# Patient Record
Sex: FEMALE | Race: BLACK OR AFRICAN AMERICAN | Employment: UNEMPLOYED | URBAN - METROPOLITAN AREA
[De-identification: names, ages, dates, MRNs, and addresses within clinical notes are randomized per-mention and may not be internally consistent; named-entity substitution may affect disease eponyms.]

---

## 2017-01-11 ENCOUNTER — HOSPITAL ENCOUNTER (OUTPATIENT)
Dept: PREADMISSION TESTING | Age: 57
Discharge: HOME | End: 2017-01-11
Attending: SURGERY
Payer: COMMERCIAL

## 2017-01-11 VITALS
DIASTOLIC BLOOD PRESSURE: 96 MMHG | BODY MASS INDEX: 28.83 KG/M2 | SYSTOLIC BLOOD PRESSURE: 161 MMHG | WEIGHT: 168.87 LBS | OXYGEN SATURATION: 98 % | HEART RATE: 60 BPM | RESPIRATION RATE: 18 BRPM | HEIGHT: 64 IN | TEMPERATURE: 98.6 F

## 2017-01-11 LAB
ANION GAP BLD CALC-SCNC: 9 MMOL/L (ref 5–15)
APPEARANCE UR: ABNORMAL
ATRIAL RATE: 61 BPM
BACTERIA URNS QL MICRO: ABNORMAL /HPF
BILIRUB UR QL: NEGATIVE
BUN SERPL-MCNC: 12 MG/DL (ref 6–20)
BUN/CREAT SERPL: 14 (ref 12–20)
CALCIUM SERPL-MCNC: 9.3 MG/DL (ref 8.5–10.1)
CALCULATED P AXIS, ECG09: 17 DEGREES
CALCULATED R AXIS, ECG10: -20 DEGREES
CALCULATED T AXIS, ECG11: -3 DEGREES
CHLORIDE SERPL-SCNC: 101 MMOL/L (ref 97–108)
CO2 SERPL-SCNC: 31 MMOL/L (ref 21–32)
COLOR UR: ABNORMAL
CREAT SERPL-MCNC: 0.88 MG/DL (ref 0.55–1.02)
DIAGNOSIS, 93000: NORMAL
EPITH CASTS URNS QL MICRO: ABNORMAL /LPF
ERYTHROCYTE [DISTWIDTH] IN BLOOD BY AUTOMATED COUNT: 13.8 % (ref 11.5–14.5)
GLUCOSE SERPL-MCNC: 100 MG/DL (ref 65–100)
GLUCOSE UR STRIP.AUTO-MCNC: NEGATIVE MG/DL
HCT VFR BLD AUTO: 39.5 % (ref 35–47)
HGB BLD-MCNC: 13.1 G/DL (ref 11.5–16)
HGB UR QL STRIP: ABNORMAL
KETONES UR QL STRIP.AUTO: NEGATIVE MG/DL
LEUKOCYTE ESTERASE UR QL STRIP.AUTO: ABNORMAL
MCH RBC QN AUTO: 29.8 PG (ref 26–34)
MCHC RBC AUTO-ENTMCNC: 33.2 G/DL (ref 30–36.5)
MCV RBC AUTO: 89.8 FL (ref 80–99)
NITRITE UR QL STRIP.AUTO: NEGATIVE
P-R INTERVAL, ECG05: 172 MS
PH UR STRIP: 6.5 [PH] (ref 5–8)
PLATELET # BLD AUTO: 156 K/UL (ref 150–400)
POTASSIUM SERPL-SCNC: 4 MMOL/L (ref 3.5–5.1)
PROT UR STRIP-MCNC: NEGATIVE MG/DL
Q-T INTERVAL, ECG07: 418 MS
QRS DURATION, ECG06: 88 MS
QTC CALCULATION (BEZET), ECG08: 420 MS
RBC # BLD AUTO: 4.4 M/UL (ref 3.8–5.2)
RBC #/AREA URNS HPF: ABNORMAL /HPF (ref 0–5)
SODIUM SERPL-SCNC: 141 MMOL/L (ref 136–145)
SP GR UR REFRACTOMETRY: 1.01 (ref 1–1.03)
UA: UC IF INDICATED,UAUC: ABNORMAL
UROBILINOGEN UR QL STRIP.AUTO: 0.2 EU/DL (ref 0.2–1)
VENTRICULAR RATE, ECG03: 61 BPM
WBC # BLD AUTO: 4.2 K/UL (ref 3.6–11)
WBC URNS QL MICRO: ABNORMAL /HPF (ref 0–4)

## 2017-01-11 PROCEDURE — 80048 BASIC METABOLIC PNL TOTAL CA: CPT | Performed by: SURGERY

## 2017-01-11 PROCEDURE — 87077 CULTURE AEROBIC IDENTIFY: CPT | Performed by: SURGERY

## 2017-01-11 PROCEDURE — 81001 URINALYSIS AUTO W/SCOPE: CPT | Performed by: SURGERY

## 2017-01-11 PROCEDURE — 86900 BLOOD TYPING SEROLOGIC ABO: CPT | Performed by: SURGERY

## 2017-01-11 PROCEDURE — 86920 COMPATIBILITY TEST SPIN: CPT | Performed by: SURGERY

## 2017-01-11 PROCEDURE — 36415 COLL VENOUS BLD VENIPUNCTURE: CPT | Performed by: SURGERY

## 2017-01-11 PROCEDURE — 87186 SC STD MICRODIL/AGAR DIL: CPT | Performed by: SURGERY

## 2017-01-11 PROCEDURE — 87086 URINE CULTURE/COLONY COUNT: CPT | Performed by: SURGERY

## 2017-01-11 PROCEDURE — 85027 COMPLETE CBC AUTOMATED: CPT | Performed by: SURGERY

## 2017-01-11 PROCEDURE — 93005 ELECTROCARDIOGRAM TRACING: CPT

## 2017-01-11 NOTE — PERIOP NOTES
[facility]  Preoperative Instructions        Surgery Date 01/18/2017          Time of Arrival 0545 am Contact # [phone]    1. On the day of your surgery, please report to the Surgical Services Registration Desk and sign in at your designated time. The Surgery Center is located to the right of the Emergency Room. 2. You must have someone with you to drive you home. You should not drive a car for 24 hours following surgery. Please make arrangements for a friend or family member to stay with you for the first 24 hours after your surgery. 3. Do not have anything to eat or drink (including water, gum, mints, coffee, juice) after midnight 01/17/2017. This may not apply to medications prescribed by your physician. Please note special instructions, if applicable. If you are currently taking Plavix, Coumadin, or other blood-thinning agents, contact your surgeon for instructions. 4. We recommend you do not drink any alcoholic beverages for 24 hours before and after your surgery. 5. Have a list of all current medications, including vitamins, herbal supplements and any other over the counter medications. Stop all Aspirin and non-steroidal anti-inflammatory drugs (I.e. Advil, Aleve), as directed by your surgeon's office. Stop all vitamins and herbal supplements seven days prior to your surgery. 6. Wear comfortable clothes. Wear glasses instead of contacts. Do not bring any money or jewelry. Please bring picture ID, insurance card, and any prearranged co-payment or hospital payment. Do not wear make-up, particularly mascara the morning of your surgery. Do not wear nail polish, particularly if you are having foot /hand surgery. Wear your hair loose or down, no ponytails, buns, eduard pins or clips. All body piercings must be removed.   Please shower with antibacterial soap for three consecutive days before and on the morning of surgery, but do not apply any lotions, powders or deodorants after the shower on the day of surgery. Please use a fresh towels after each shower. Please sleep in clean clothes and change bed linens the night before surgery. Please do not shave for 48 hours prior to surgery. Shaving of the face is acceptable. 7. You should understand that if you do not follow these instructions your surgery may be cancelled. If your physical condition changes (I.e. fever, cold or flu) please contact your surgeon as soon as possible. 8. It is important that you be on time. If a situation occurs where you may be late, please call [phone] (OR Holding Area). 9. If you have any questions and or problems, please call [phone] (Pre-admission Testing). 10. Your surgery time may be subject to change. You will receive a phone call the evening prior if your time changes. 11.  If having outpatient surgery, you must have someone to drive you here, stay with you during the duration of your stay, and to drive you home at time of discharge. Special Instructions:follow bowel prep per surgeon    MEDICATIONS TO TAKE THE MORNING OF SURGERY WITH A SIP OF WATER:xanax if needed, zyrtec if needed, metoprolol      I understand a pre-operative phone call will be made to verify my surgery time. In the event that I am not available, I give permission for a message to be left on my answering service and/or with another person?   yes         ___________________      __________   _________    (Signature of Patient)             (Witness)                (Date and Time)

## 2017-01-12 ENCOUNTER — TELEPHONE (OUTPATIENT)
Dept: SURGERY | Age: 57
End: 2017-01-12

## 2017-01-12 NOTE — TELEPHONE ENCOUNTER
Courtney called from Pre-op. EKG abnormal. Patient needs cardiac clearance. Spoke to [place] made for 1/13. Mrs. Bernarda Gooden has Nithin Flores now. She does not have cards yet. But ID she does not know if she needs referral. But she is going to check into it. For now will keep surgery on for 1/18.

## 2017-01-13 LAB
BACTERIA SPEC CULT: NORMAL
CC UR VC: NORMAL
SERVICE CMNT-IMP: NORMAL

## 2017-01-13 RX ORDER — SODIUM CHLORIDE, SODIUM LACTATE, POTASSIUM CHLORIDE, CALCIUM CHLORIDE 600; 310; 30; 20 MG/100ML; MG/100ML; MG/100ML; MG/100ML
25 INJECTION, SOLUTION INTRAVENOUS CONTINUOUS
Status: CANCELLED | OUTPATIENT
Start: 2017-01-18

## 2017-01-13 NOTE — PERIOP NOTES
Called and spoke to Sue Gonzales in Dr Roy's office about her abnormal urine culture. She will have Dr Edwina Black evaluate for treatment. Pt surgery was changed to 02/01/2017.

## 2017-01-16 ENCOUNTER — OFFICE VISIT (OUTPATIENT)
Dept: INTERNAL MEDICINE CLINIC | Facility: CLINIC | Age: 57
End: 2017-01-16

## 2017-01-16 VITALS
OXYGEN SATURATION: 96 % | DIASTOLIC BLOOD PRESSURE: 101 MMHG | HEIGHT: 64 IN | WEIGHT: 167.4 LBS | TEMPERATURE: 96.8 F | BODY MASS INDEX: 28.58 KG/M2 | RESPIRATION RATE: 18 BRPM | HEART RATE: 62 BPM | SYSTOLIC BLOOD PRESSURE: 150 MMHG

## 2017-01-16 DIAGNOSIS — K57.31 DIVERTICULOSIS OF LARGE INTESTINE WITH HEMORRHAGE: Primary | Chronic | ICD-10-CM

## 2017-01-16 DIAGNOSIS — F41.9 ANXIETY DISORDER, UNSPECIFIED TYPE: Chronic | ICD-10-CM

## 2017-01-16 DIAGNOSIS — I10 ESSENTIAL HYPERTENSION WITH GOAL BLOOD PRESSURE LESS THAN 140/90: Chronic | ICD-10-CM

## 2017-01-16 DIAGNOSIS — N30.01 ACUTE CYSTITIS WITH HEMATURIA: ICD-10-CM

## 2017-01-16 RX ORDER — ALPRAZOLAM 0.5 MG/1
TABLET ORAL
Qty: 60 TAB | Refills: 1 | Status: CANCELLED | OUTPATIENT
Start: 2017-01-16

## 2017-01-16 RX ORDER — LISINOPRIL AND HYDROCHLOROTHIAZIDE 20; 25 MG/1; MG/1
TABLET ORAL
Qty: 90 TAB | Refills: 1 | Status: SHIPPED | OUTPATIENT
Start: 2017-01-16 | End: 2017-07-12 | Stop reason: SDUPTHER

## 2017-01-16 RX ORDER — CIPROFLOXACIN 500 MG/1
500 TABLET ORAL 2 TIMES DAILY
Qty: 6 TAB | Refills: 0 | Status: SHIPPED | OUTPATIENT
Start: 2017-01-16 | End: 2017-02-05

## 2017-01-16 RX ORDER — POTASSIUM CHLORIDE 750 MG/1
TABLET, EXTENDED RELEASE ORAL
Qty: 90 TAB | Refills: 1 | Status: SHIPPED | OUTPATIENT
Start: 2017-01-16 | End: 2017-07-12 | Stop reason: SDUPTHER

## 2017-01-16 RX ORDER — METOPROLOL SUCCINATE 50 MG/1
TABLET, EXTENDED RELEASE ORAL
Qty: 90 TAB | Refills: 1 | Status: SHIPPED | OUTPATIENT
Start: 2017-01-16 | End: 2017-07-12 | Stop reason: SDUPTHER

## 2017-01-16 NOTE — PROGRESS NOTES
HISTORY OF PRESENT ILLNESS  Pavithra Adams is a 60 y.o. female. New patient. Wants to come here because it is close to her home and we have more same-day availability than her last practice. HPI   1) Hx severe diverticulitis with 2 hospitalizations - first one in 2014 for 14 days. Most recently for 6 days in Sept 2016. Has surgery scheduled, but has to be first cleared by Cardiology. Patient doesn't remember the name of her cardiologist, but she needs a referral and she has an appointment tomorrow morning at 9 AM.     2) HTN - Uncontrolled today because pt has been off of her medication for over 1 week. She plans to go to the pharmacy immediately after our visit today for refills. 3) Anxiety - controlled. Taking xanax ~1x/week. Not due for refill. 4) Had pre-op labs done per Surgeon and was found to have UTI. Reviewed culture results with patient. Pt has not yet been given an antibiotic or informed of these lab results by her surgeon. She is asymptomatic. Review of Systems   Constitutional: Negative for fever. Respiratory: Negative for shortness of breath. Cardiovascular: Negative for chest pain and palpitations. Gastrointestinal: Negative for abdominal pain and blood in stool. Genitourinary: Negative for dysuria, frequency and urgency. Physical Exam   Constitutional: She is oriented to person, place, and time. She appears well-developed and well-nourished. No distress. HENT:   Head: Normocephalic and atraumatic. Neck: Neck supple. No JVD present. Cardiovascular: Normal rate, regular rhythm and normal heart sounds. Pulmonary/Chest: Effort normal and breath sounds normal. No respiratory distress. Musculoskeletal: She exhibits no edema. Neurological: She is alert and oriented to person, place, and time. Skin: Skin is warm and dry. Psychiatric: She has a normal mood and affect.  Her behavior is normal. Judgment and thought content normal.   Nursing note and vitals reviewed. ASSESSMENT and PLAN    ICD-10-CM ICD-9-CM    1. Diverticulosis of large intestine with hemorrhage K57.31 562.12 REFERRAL TO GENERAL SURGERY  Await message from pt re: name of cardiologist.    2. Essential hypertension with goal blood pressure less than 140/90 I10 401.9 lisinopril-hydroCHLOROthiazide (PRINZIDE, ZESTORETIC) 20-25 mg per tablet      metoprolol succinate (TOPROL-XL) 50 mg XL tablet      potassium chloride (KLOR-CON M10) 10 mEq tablet   3. Anxiety disorder, unspecified type F41.9 300.00 Controlled with rare use of Xanax. Refill not given today because pt is not currently in need of refill.     4. Acute cystitis with hematuria N30.01 595.0 ciprofloxacin HCl (CIPRO) 500 mg tablet

## 2017-01-16 NOTE — PROGRESS NOTES
Room 7    Chief Complaint   Patient presents with    New Patient     To establish care. Patient has pending surgery of sigmoid colectomy     1. Have you been to the ER, urgent care clinic since your last visit? Hospitalized since your last visit? Yes Reason for visit: To [facility] for abdominal pain on 9/13/2016. Dx. Diverticulosis    2. Have you seen or consulted any other health care providers outside of the [address] [place] [address] since your last visit? Include any pap smears or colon screening. No     No Health maintenance due    Reviewed advance directives/living will. Patient does not have one in place. Information attached to patient's AVS    B/P elevated.  Patient states she did not take medications today

## 2017-01-16 NOTE — MR AVS SNAPSHOT
Visit Information Date & Time Provider Department Dept. Phone Encounter #  
 1/16/2017  2:00 PM Maribel Guzmán, [address] [place] [address] Internal Medicine [phone] [id number] Follow-up Instructions Return for Physical when convenient. Your Appointments 1/17/2017  9:45 AM  
New Patient with Sameera Hong MD  
[facility] Associate [address] [facility] Appt Note: clearance for lap sig moidcolectomy on 1/18/17; must be seen asap [address] [address]  
[phone]  
  
   
 [address] Upcoming Health Maintenance Date Due  
 BREAST CANCER SCRN MAMMOGRAM 12/3/2017 DTaP/Tdap/Td series (2 - Td) 6/1/2025 COLONOSCOPY 11/9/2026 Allergies as of 1/16/2017  Review Complete On: 1/16/2017 By: Maribel Guzmán PA-C Severity Noted Reaction Type Reactions Cephalexin  01/16/2017    Hives Current Immunizations  Reviewed on 4/26/2015 Name Date Influenza Vaccine 9/1/2016 Influenza Vaccine (Quad) 10/8/2015 11:59 AM  
 Influenza Vaccine Intradermal PF 2/20/2014  9:13 AM  
 Tdap 6/1/2015 Not reviewed this visit You Were Diagnosed With   
  
 Codes Comments Acute cystitis with hematuria    -  Primary ICD-10-CM: N30.01 
ICD-9-CM: 595.0 Anxiety disorder, unspecified type     ICD-10-CM: F41.9 ICD-9-CM: 300.00 Essential hypertension with goal blood pressure less than 140/90     ICD-10-CM: I10 
ICD-9-CM: 401.9 Diverticulosis of large intestine with hemorrhage     ICD-10-CM: K57.31 
ICD-9-CM: 562.12 Vitals BP Pulse Temp Resp Height(growth percentile) Weight(growth percentile) (!) 150/101 (BP 1 Location: Left arm, BP Patient Position: Sitting) 62 96.8 °F (36 °C) (Oral) 18 5' 4\" (1.626 m) 167 lb 6.4 oz (75.9 kg) LMP SpO2 BMI OB Status Smoking Status 02/12/2005 96% 28.73 kg/m2 Hysterectomy Never Smoker Vitals History BMI and BSA Data Body Mass Index Body Surface Area 28.73 kg/m 2 1.85 m 2 Preferred Pharmacy Pharmacy Name Phone [address] [phone] Your Updated Medication List  
  
   
This list is accurate as of: 1/16/17  2:57 PM.  Always use your most recent med list.  
  
  
  
  
 ALPRAZolam 0.5 mg tablet Commonly known as:  XANAX  
TAKE ONE-HALF TO ONE TABLET BY MOUTH EVERY 8 HOURS AS NEEDED FOR ANXIETY, AGGRESSION, AND SLEEP **SHOULD LAST AT LEAST 30 DAYS**  
  
 ciprofloxacin HCl 500 mg tablet Commonly known as:  CIPRO Take 1 Tab by mouth two (2) times a day. lisinopril-hydroCHLOROthiazide 20-25 mg per tablet Commonly known as:  PRINZIDE, ZESTORETIC  
TAKE ONE TABLET BY MOUTH EVERY DAY  
  
 metoprolol succinate 50 mg XL tablet Commonly known as:  TOPROL-XL  
TAKE ONE TABLET BY MOUTH EVERY DAY  
  
 naproxen 500 mg tablet Commonly known as:  NAPROSYN Take 1 Tab by mouth every twelve (12) hours as needed for Pain.  
  
 potassium chloride 10 mEq tablet Commonly known as:  KLOR-CON M10  
TAKE ONE TABLET BY MOUTH EVERY DAY ZyrTEC 10 mg tablet Generic drug:  cetirizine Take 10 mg by mouth daily as needed. Prescriptions Sent to Pharmacy Refills  
 lisinopril-hydroCHLOROthiazide (PRINZIDE, ZESTORETIC) 20-25 mg per tablet 1 Sig: TAKE ONE TABLET BY MOUTH EVERY DAY Class: Normal  
 Pharmacy: [address] Ph #: [phone]  
 metoprolol succinate (TOPROL-XL) 50 mg XL tablet 1 Sig: TAKE ONE TABLET BY MOUTH EVERY DAY Class: Normal  
 Pharmacy: [address] Ph #: [phone]  
 potassium chloride (KLOR-CON M10) 10 mEq tablet 1 Sig: TAKE ONE TABLET BY MOUTH EVERY DAY Class: Normal  
 Pharmacy: [address] Ph #: [phone] ciprofloxacin HCl (CIPRO) 500 mg tablet 0 Sig: Take 1 Tab by mouth two (2) times a day. Class: Normal  
 Pharmacy: Audrey Giron [address] , [address] [facility] #: [phone] Route: Oral  
  
We Performed the Following REFERRAL TO GENERAL SURGERY [REF27 Custom] Follow-up Instructions Return for Physical when convenient. Referral Information Referral ID Referred By Referred To  
  
 [id number] Yudi Loaiza MD   
   [address] [place] [address] [address] [place] [address] Phone: [phone] Fax: [phone] Visits Status Start Date End Date 1 New Request 1/16/17 1/16/18 If your referral has a status of pending review or denied, additional information will be sent to support the outcome of this decision. Patient Instructions Ernestine Nunez 1721 What is a living will? A living will is a legal form you use to write down the kind of care you want at the end of your life. It is used by the health professionals who will treat you if you aren't able to decide for yourself. If you put your wishes in writing, your loved ones and others will know what kind of care you want. They won't need to guess. This can ease your mind and be helpful to others. A living will is not the same as an estate or property will. An estate will explains what you want to happen with your money and property after you die. Is a living will a legal document? A living will is a legal document. Each state has its own laws about living ludwig. If you move to another state, make sure that your living will is legal in the state where you now live. Or you might use a universal form that has been approved by many states. This kind of form can sometimes be completed and stored online. Your electronic copy will then be available wherever you have a connection to the Internet.  In most cases, doctors will respect your wishes even if you have a form from a different state. · You don't need an  to complete a living will. But legal advice can be helpful if your state's laws are unclear, your health history is complicated, or your family can't agree on what should be in your living will. · You can change your living will at any time. Some people find that their wishes about end-of-life care change as their health changes. · In addition to making a living will, think about completing a medical power of  form. This form lets you name the person you want to make end-of-life treatment decisions for you (your \"health care agent\") if you're not able to. Many hospitals and nursing homes will give you the forms you need to complete a living will and a medical power of . · Your living will is used only if you can't make or communicate decisions for yourself anymore. If you become able to make decisions again, you can accept or refuse any treatment, no matter what you wrote in your living will. · Your state may offer an online registry. This is a place where you can store your living will online so the doctors and nurses who need to treat you can find it right away. What should you think about when creating a living will? Talk about your end-of-life wishes with your family members and your doctor. Let them know what you want. That way the people making decisions for you won't be surprised by your choices. Think about these questions as you make your living will: · Do you know enough about life support methods that might be used? If not, talk to your doctor so you know what might be done if you can't breathe on your own, your heart stops, or you're unable to swallow. · What things would you still want to be able to do after you receive life-support methods? Would you want to be able to walk? To speak? To eat on your own? To live without the help of machines? · If you have a choice, where do you want to be cared for? In your home? At a hospital or nursing home? · Do you want certain Mandaen practices performed if you become very ill? · If you have a choice at the end of your life, where would you prefer to die? At home? In a hospital or nursing home? Somewhere else? · Would you prefer to be buried or cremated? · Do you want your organs to be donated after you die? What should you do with your living will? · Make sure that your family members and your health care agent have copies of your living will. · Give your doctor a copy of your living will to keep in your medical record. If you have more than one doctor, make sure that each one has a copy. · You may want to put a copy of your living will where it can be easily found. Where can you learn more? Go to http://jean carlos-rufus.info/. Enter V022 in the search box to learn more about \"Learning About Living Melida Endo. \" Current as of: February 24, 2016 Content Version: 11.1 © 8249-1906 [employer]. Care instructions adapted under license by [employer] (which disclaims liability or warranty for this information). If you have questions about a medical condition or this instruction, always ask your healthcare professional. [address] any warranty or liability for your use of this information. Introducing [facility]! [employer] introduces Uzabase patient portal. Now you can access parts of your medical record, email your doctor's office, and request medication refills online. 1. In your internet browser, go to [email or web address]. [email or web address]'s Premium 2. Click on the First Time User? Click Here link in the Sign In box. You will see the New Member Sign Up page. 3. Enter your Uzabase Access Code exactly as it appears below. You will not need to use this code after youve completed the sign-up process.  If you do not sign up before the expiration date, you must request a new code. · [email or web address] Access Code: [email or web address] Expires: 2/14/2017  5:01 PM 
 
4. Enter the last four digits of your Social Security Number (xxxx) and Date of Birth (mm/dd/yyyy) as indicated and click Submit. You will be taken to the next sign-up page. 5. Create a Semant.io ID. This will be your Semant.io login ID and cannot be changed, so think of one that is secure and easy to remember. 6. Create a Semant.io password. You can change your password at any time. 7. Enter your Password Reset Question and Answer. This can be used at a later time if you forget your password. 8. Enter your e-mail address. You will receive e-mail notification when new information is available in [address] 9. Click Sign Up. You can now view and download portions of your medical record. 10. Click the Download Summary menu link to download a portable copy of your medical information. If you have questions, please visit the Frequently Asked Questions section of the [email or web address] website. Remember, Semant.io is NOT to be used for urgent needs. For medical emergencies, dial 911. Now available from your iPhone and Android! Please provide this summary of care documentation to your next provider. Your primary care clinician is listed as Veronique Andrade. If you have any questions after today's visit, please call [phone].

## 2017-01-16 NOTE — PATIENT INSTRUCTIONS
Learning About Living Dmitriy  What is a living will? A living will is a legal form you use to write down the kind of care you want at the end of your life. It is used by the health professionals who will treat you if you aren't able to decide for yourself. If you put your wishes in writing, your loved ones and others will know what kind of care you want. They won't need to guess. This can ease your mind and be helpful to others. A living will is not the same as an estate or property will. An estate will explains what you want to happen with your money and property after you die. Is a living will a legal document? A living will is a legal document. Each state has its own laws about living ludwig. If you move to another state, make sure that your living will is legal in the state where you now live. Or you might use a universal form that has been approved by many states. This kind of form can sometimes be completed and stored online. Your electronic copy will then be available wherever you have a connection to the Internet. In most cases, doctors will respect your wishes even if you have a form from a different state. · You don't need an  to complete a living will. But legal advice can be helpful if your state's laws are unclear, your health history is complicated, or your family can't agree on what should be in your living will. · You can change your living will at any time. Some people find that their wishes about end-of-life care change as their health changes. · In addition to making a living will, think about completing a medical power of  form. This form lets you name the person you want to make end-of-life treatment decisions for you (your \"health care agent\") if you're not able to. Many hospitals and nursing homes will give you the forms you need to complete a living will and a medical power of .   · Your living will is used only if you can't make or communicate decisions for yourself anymore. If you become able to make decisions again, you can accept or refuse any treatment, no matter what you wrote in your living will. · Your state may offer an online registry. This is a place where you can store your living will online so the doctors and nurses who need to treat you can find it right away. What should you think about when creating a living will? Talk about your end-of-life wishes with your family members and your doctor. Let them know what you want. That way the people making decisions for you won't be surprised by your choices. Think about these questions as you make your living will:  · Do you know enough about life support methods that might be used? If not, talk to your doctor so you know what might be done if you can't breathe on your own, your heart stops, or you're unable to swallow. · What things would you still want to be able to do after you receive life-support methods? Would you want to be able to walk? To speak? To eat on your own? To live without the help of machines? · If you have a choice, where do you want to be cared for? In your home? At a hospital or nursing home? · Do you want certain Sabianism practices performed if you become very ill? · If you have a choice at the end of your life, where would you prefer to die? At home? In a hospital or nursing home? Somewhere else? · Would you prefer to be buried or cremated? · Do you want your organs to be donated after you die? What should you do with your living will? · Make sure that your family members and your health care agent have copies of your living will. · Give your doctor a copy of your living will to keep in your medical record. If you have more than one doctor, make sure that each one has a copy. · You may want to put a copy of your living will where it can be easily found. Where can you learn more? Go to http://jean carlos-rufus.info/.   Enter W925 in the search box to learn more about \"Learning About Living Perroy. \"  Current as of: February 24, 2016  Content Version: 11.1  © 5890-0395 [employer]. Care instructions adapted under license by [employer] (which disclaims liability or warranty for this information). If you have questions about a medical condition or this instruction, always ask your healthcare professional. [address] any warranty or liability for your use of this information.

## 2017-01-17 ENCOUNTER — OFFICE VISIT (OUTPATIENT)
Dept: CARDIOLOGY CLINIC | Age: 57
End: 2017-01-17

## 2017-01-17 VITALS
OXYGEN SATURATION: 99 % | SYSTOLIC BLOOD PRESSURE: 130 MMHG | HEIGHT: 64 IN | RESPIRATION RATE: 16 BRPM | HEART RATE: 78 BPM | BODY MASS INDEX: 28.34 KG/M2 | DIASTOLIC BLOOD PRESSURE: 80 MMHG | WEIGHT: 166 LBS

## 2017-01-17 DIAGNOSIS — I10 HYPERTENSION, GOAL BELOW 140/90: Chronic | ICD-10-CM

## 2017-01-17 DIAGNOSIS — R94.31 ABNORMAL EKG: ICD-10-CM

## 2017-01-17 DIAGNOSIS — R94.31 PRE-OPERATIVE CARDIOVASCULAR EXAM, NEW EKG ABNORMALITIES C/W ISCHEMIA: Primary | ICD-10-CM

## 2017-01-17 DIAGNOSIS — Z01.810 PRE-OPERATIVE CARDIOVASCULAR EXAM, NEW EKG ABNORMALITIES C/W ISCHEMIA: Primary | ICD-10-CM

## 2017-01-17 NOTE — PERIOP NOTES
Called Dr Roy's office and spoke to Lisa Duong to ask about abnormal urine culture. She informed me that Dr Collette Heimlich has the results to review on his desk.

## 2017-01-17 NOTE — TELEPHONE ENCOUNTER
Left message for Mrs. Chase regarding surgery for 2/1. Need to speak to her regarding re-type and cross.

## 2017-01-17 NOTE — PROGRESS NOTES
Chief Complaint   Patient presents with    New Patient     cardiac clearance-pt denies any cardiac symptoms

## 2017-01-17 NOTE — MR AVS SNAPSHOT
Visit Information Date & Time Provider Department Dept. Phone Encounter #  
 1/17/2017  9:45 AM Black York, [address] [place] [address] Cardiology Associate Declan [phone] [id number] Follow-up Instructions Routing History Follow-up and Disposition History Upcoming Health Maintenance Date Due  
 BREAST CANCER SCRN MAMMOGRAM 12/3/2017 DTaP/Tdap/Td series (2 - Td) 6/1/2025 COLONOSCOPY 11/9/2026 Allergies as of 1/17/2017  Review Complete On: 1/17/2017 By: Black York MD  
  
 Severity Noted Reaction Type Reactions Cephalexin  01/16/2017    Hives Current Immunizations  Reviewed on 4/26/2015 Name Date Influenza Vaccine 9/1/2016 Influenza Vaccine (Quad) 10/8/2015 11:59 AM  
 Influenza Vaccine Intradermal PF 2/20/2014  9:13 AM  
 Tdap 6/1/2015 Not reviewed this visit You Were Diagnosed With   
  
 Codes Comments Pre-operative cardiovascular exam, new EKG abnormalities c/w ischemia    -  Primary ICD-10-CM: Z01.810, R94.31 
ICD-9-CM: V72.81, 794.31 Hypertension, goal below 140/90     ICD-10-CM: I10 
ICD-9-CM: 401.9 Abnormal EKG     ICD-10-CM: R94.31 
ICD-9-CM: 794.31 Vitals BP Pulse Resp Height(growth percentile) Weight(growth percentile) LMP  
 130/80 (BP 1 Location: Right arm, BP Patient Position: Sitting) 78 16 5' 4\" (1.626 m) 166 lb (75.3 kg) 02/12/2005 SpO2 BMI OB Status Smoking Status 99% 28.49 kg/m2 Hysterectomy Never Smoker Vitals History BMI and BSA Data Body Mass Index Body Surface Area  
 28.49 kg/m 2 1.84 m 2 Preferred Pharmacy Pharmacy Name Phone [facility] [address] , [address] [facility] [phone] Your Updated Medication List  
  
   
This list is accurate as of: 1/17/17 10:48 AM.  Always use your most recent med list.  
  
  
  
  
 ALPRAZolam 0.5 mg tablet Commonly known as:  [place] Curio TAKE ONE-HALF TO ONE TABLET BY MOUTH EVERY 8 HOURS AS NEEDED FOR ANXIETY, AGGRESSION, AND SLEEP **SHOULD LAST AT LEAST 30 DAYS**  
  
 ciprofloxacin HCl 500 mg tablet Commonly known as:  CIPRO Take 1 Tab by mouth two (2) times a day. lisinopril-hydroCHLOROthiazide 20-25 mg per tablet Commonly known as:  PRINZIDE, ZESTORETIC  
TAKE ONE TABLET BY MOUTH EVERY DAY  
  
 metoprolol succinate 50 mg XL tablet Commonly known as:  TOPROL-XL  
TAKE ONE TABLET BY MOUTH EVERY DAY  
  
 potassium chloride 10 mEq tablet Commonly known as:  KLOR-CON M10  
TAKE ONE TABLET BY MOUTH EVERY DAY ZyrTEC 10 mg tablet Generic drug:  cetirizine Take 10 mg by mouth daily as needed. We Performed the Following AMB POC EKG ROUTINE W/ 12 LEADS, INTER & REP [12204 CPT(R)] To-Do List   
 01/17/2017 ECHO:  2D ECHO COMPLETE ADULT (TTE) W OR WO CONTR Introducing [facility]! Will Dickinson introduces [employer] patient portal. Now you can access parts of your medical record, email your doctor's office, and request medication refills online. 1. In your internet browser, go to [email or web address] 2. Click on the First Time User? Click Here link in the Sign In box. You will see the New Member Sign Up page. 3. Enter your [employer] Access Code exactly as it appears below. You will not need to use this code after youve completed the sign-up process. If you do not sign up before the expiration date, you must request a new code. · [employer] Access Code: [email or web address] Expires: 2/14/2017  5:01 PM 
 
4. Enter the last four digits of your Social Security Number (xxxx) and Date of Birth (mm/dd/yyyy) as indicated and click Submit. You will be taken to the next sign-up page. 5. Create a [employer] ID. This will be your [employer] login ID and cannot be changed, so think of one that is secure and easy to remember. 6. Create a [employer] password. You can change your password at any time. 7. Enter your Password Reset Question and Answer. This can be used at a later time if you forget your password. 8. Enter your e-mail address. You will receive e-mail notification when new information is available in [address] 9. Click Sign Up. You can now view and download portions of your medical record. 10. Click the Download Summary menu link to download a portable copy of your medical information. If you have questions, please visit the Frequently Asked Questions section of the [employer] website. Remember, [employer] is NOT to be used for urgent needs. For medical emergencies, dial 911. Now available from your iPhone and Android! Please provide this summary of care documentation to your next provider. Your primary care clinician is listed as Shira Mcneal. If you have any questions after today's visit, please call [phone].

## 2017-01-17 NOTE — PROGRESS NOTES
[address] [place] [address] [address] [facility]  [phone]     Subjective:      Yayo Bullock is a 60 y.o. female is here for NP eval for septal MI seen on pre-op EKG. No cardiac Hx. The patient denies chest pain/ shortness of breath, orthopnea, PND, LE edema, palpitations, syncope, or presyncope.        Patient Active Problem List    Diagnosis Date Noted    Diverticulitis of intestine with abscess 09/13/2016    Hypovitaminosis D 04/27/2015    Microscopic hematuria 04/27/2015    Sigmoid diverticulitis 02/14/2014    Diverticulosis of large intestine with hemorrhage 02/14/2014     Class: Chronic    Anxiety disorder 02/14/2014     Class: Chronic    Dehydration 02/14/2014    Hyperlipidemia with target LDL less than 100 05/13/2013     Class: Chronic    Hypertension, goal below 140/90 10/16/2012     Class: Chronic      Chandni Taylor PA-C  Past Medical History   Diagnosis Date    Diverticulosis of large intestine with hemorrhage 02/14/2014     distal sigmoid colon, mid-sigmoid colon, descending colon - colonoscopy 11/9/16    Hyperlipemia       5/2016; not on medication    Hypertension     Hypovitaminosis D 04/27/2015     level normal 5/2016    Microscopic hematuria 4/27/2015    Psychiatric disorder      anxiety      Past Surgical History   Procedure Laterality Date    Hx hysterectomy  3/17/2005     TVH    Hx gyn       ectopic pregnancy    Hx colonoscopy  07/23/2015     Allergies   Allergen Reactions    Cephalexin Hives      Family History   Problem Relation Age of Onset    Hypertension Mother     Hypertension Father     Hypertension Sister     Hypertension Brother     Hypertension Sister     Hypertension Sister     Hypertension Sister       Social History     Social History    Marital status:      Spouse name: N/A    Number of children: N/A    Years of education: N/A     Occupational History    caregiver      Social History Main Topics    Smoking status: Never Smoker    Smokeless tobacco: Never Used    Alcohol use 1.2 oz/week     2 Glasses of wine per week      Comment: occassionally:     Drug use: Yes     Special: Marijuana      Comment: last used in December 2016    Sexual activity: Yes     Partners: Male     Birth control/ protection: Surgical     Other Topics Concern    Not on file     Social History Narrative      Current Outpatient Prescriptions   Medication Sig    lisinopril-hydroCHLOROthiazide (PRINZIDE, ZESTORETIC) 20-25 mg per tablet TAKE ONE TABLET BY MOUTH EVERY DAY    metoprolol succinate (TOPROL-XL) 50 mg XL tablet TAKE ONE TABLET BY MOUTH EVERY DAY    potassium chloride (KLOR-CON M10) 10 mEq tablet TAKE ONE TABLET BY MOUTH EVERY DAY    ciprofloxacin HCl (CIPRO) 500 mg tablet Take 1 Tab by mouth two (2) times a day.  ALPRAZolam (XANAX) 0.5 mg tablet TAKE ONE-HALF TO ONE TABLET BY MOUTH EVERY 8 HOURS AS NEEDED FOR ANXIETY, AGGRESSION, AND SLEEP **SHOULD LAST AT LEAST 30 DAYS**    cetirizine (ZYRTEC) 10 mg tablet Take 10 mg by mouth daily as needed. No current facility-administered medications for this visit. Review of Symptoms:  11 systems reviewed, negative other than as stated in the HPI    Physical ExamPhysical Exam:    Vitals:    01/17/17 0949 01/17/17 0955   BP: 124/78 130/80   Pulse:  78   Resp:  16   SpO2:  99%   Weight: 166 lb (75.3 kg)    Height: 5' 4\" (1.626 m)      Body mass index is 28.49 kg/(m^2). General PE   Gen:  NAD  Mental Status - Alert. General Appearance - Not in acute distress. Chest and Lung Exam   Inspection: Accessory muscles - No use of accessory muscles in breathing. Auscultation:   Breath sounds: - Normal.   Cardiovascular   Inspection: Jugular vein - Bilateral - Inspection Normal.   Palpation/Percussion:   Apical Impulse: - Normal.   Auscultation: Rhythm - Regular. Heart Sounds - S1 WNL and S2 WNL. No S3 or S4. Murmurs & Other Heart Sounds: Auscultation of the heart reveals - No Murmurs. Peripheral Vascular   Upper Extremity: Inspection - Bilateral - No Cyanotic nailbeds or Digital clubbing. Lower Extremity:   Palpation: Edema - Bilateral - No edema. Abdomen:   Soft, non-tender, bowel sounds are active. Neuro: A&O times 3, CN and motor grossly WNL    Labs:   Lab Results   Component Value Date/Time    Cholesterol, total 226 05/03/2016 05:19 PM    Cholesterol, total 242 04/20/2015 03:29 PM    Cholesterol, total 238 07/17/2013 11:34 AM    Cholesterol, total 230 05/13/2013 09:06 AM    Cholesterol, total 235 10/30/2012 09:17 AM    HDL Cholesterol 68 05/03/2016 05:19 PM    HDL Cholesterol 60 04/20/2015 03:29 PM    HDL Cholesterol 64 07/17/2013 11:34 AM    HDL Cholesterol 65 05/13/2013 09:06 AM    HDL Cholesterol 64 10/30/2012 09:17 AM    LDL, calculated 140 05/03/2016 05:19 PM    LDL, calculated 163 04/20/2015 03:29 PM    LDL, calculated 158 07/17/2013 11:34 AM    LDL, calculated 149 05/13/2013 09:06 AM    LDL, calculated 155 10/30/2012 09:17 AM    Triglyceride 88 05/03/2016 05:19 PM    Triglyceride 95 04/20/2015 03:29 PM    Triglyceride 82 07/17/2013 11:34 AM    Triglyceride 79 05/13/2013 09:06 AM    Triglyceride 82 10/30/2012 09:17 AM     No results found for: CPK, CPKX, CPX  Lab Results   Component Value Date/Time    Sodium 141 01/11/2017 09:16 AM    Potassium 4.0 01/11/2017 09:16 AM    Chloride 101 01/11/2017 09:16 AM    CO2 31 01/11/2017 09:16 AM    Anion gap 9 01/11/2017 09:16 AM    Glucose 100 01/11/2017 09:16 AM    BUN 12 01/11/2017 09:16 AM    Creatinine 0.88 01/11/2017 09:16 AM    BUN/Creatinine ratio 14 01/11/2017 09:16 AM    GFR est AA >60 01/11/2017 09:16 AM    GFR est non-AA >60 01/11/2017 09:16 AM    Calcium 9.3 01/11/2017 09:16 AM    Bilirubin, total 0.8 09/14/2016 03:14 AM    ALT 17 09/14/2016 03:14 AM    AST 10 09/14/2016 03:14 AM    Alk.  phosphatase 83 09/14/2016 03:14 AM    Protein, total 6.5 09/14/2016 03:14 AM    Albumin 2.2 09/14/2016 03:14 AM    Globulin 4.3 09/14/2016 03:14 AM    A-G Ratio 0.5 09/14/2016 03:14 AM       EKG:  Sinus  Rhythm   -  Nonspecific T-abnormality. ABNORMAL      Assessment:     Assessment:      1. Pre-operative cardiovascular exam, new EKG abnormalities c/w ischemia    2. Hypertension, goal below 140/90    3. Abnormal EKG        Orders Placed This Encounter    AMB POC EKG ROUTINE W/ 12 LEADS, INTER & REP     Order Specific Question:   Reason for Exam:     Answer:   routine    2D ECHO COMPLETE ADULT (TTE) W OR WO CONTR     Standing Status:   Future     Standing Expiration Date:   7/17/2017     Order Specific Question:   Reason for Exam:     Answer:   ? old MI     Order Specific Question:   Contrast Enhancement (Bubble Study, Definity, Optison) may be used if criteria listed in established evidence-based protocol has been identified. Answer:   Yes        Plan:     Asymptomatic with ? Of old septal MI seen on pre-op EKG. No Hx cardiac disease and MI not present on today's EKG. Suspect this is an EKG artifact from lead positioning. Will evaluate further with echo. If echo OK, she is cleared.     Gwendolyn Ornelas MD

## 2017-01-19 NOTE — PERIOP NOTES
Malcolm Wright called from Dr. Roy's office and no treatment for cultture at this time. Will treat DOS.

## 2017-01-22 LAB
ABO + RH BLD: NORMAL
BLD PROD TYP BPU: NORMAL
BLD PROD TYP BPU: NORMAL
BLOOD GROUP ANTIBODIES SERPL: NORMAL
BPU ID: NORMAL
BPU ID: NORMAL
CROSSMATCH RESULT,%XM: NORMAL
CROSSMATCH RESULT,%XM: NORMAL
SPECIMEN EXP DATE BLD: NORMAL
STATUS OF UNIT,%ST: NORMAL
STATUS OF UNIT,%ST: NORMAL
UNIT DIVISION, %UDIV: 0
UNIT DIVISION, %UDIV: 0

## 2017-01-23 ENCOUNTER — CLINICAL SUPPORT (OUTPATIENT)
Dept: CARDIOLOGY CLINIC | Age: 57
End: 2017-01-23

## 2017-01-23 DIAGNOSIS — R94.31 ABNORMAL EKG: ICD-10-CM

## 2017-01-23 DIAGNOSIS — Z01.810 PRE-OPERATIVE CARDIOVASCULAR EXAM, NEW EKG ABNORMALITIES C/W ISCHEMIA: ICD-10-CM

## 2017-01-23 DIAGNOSIS — I10 HYPERTENSION, GOAL BELOW 140/90: Chronic | ICD-10-CM

## 2017-01-23 DIAGNOSIS — R94.31 PRE-OPERATIVE CARDIOVASCULAR EXAM, NEW EKG ABNORMALITIES C/W ISCHEMIA: ICD-10-CM

## 2017-01-23 NOTE — TELEPHONE ENCOUNTER
Called left message surgery is on 2/1. Spoke to pt and discussed details of surgery.  Also patient is set up for pre-op 1/27

## 2017-01-25 ENCOUNTER — TELEPHONE (OUTPATIENT)
Dept: CARDIOLOGY CLINIC | Age: 57
End: 2017-01-25

## 2017-01-25 NOTE — TELEPHONE ENCOUNTER
Notes Recorded by Jennifer Borges LPN on 3/72/2433 at 1:82 PM  Verified patient with two identifiers. Meggan Traore aware echo was ok and cleared for surgery     Will fax copy of echo and office note to Dr. Mauro Blackwell [phone].

## 2017-01-27 ENCOUNTER — HOSPITAL ENCOUNTER (OUTPATIENT)
Dept: PREADMISSION TESTING | Age: 57
Discharge: HOME | End: 2017-01-27
Attending: SURGERY
Payer: COMMERCIAL

## 2017-01-27 PROCEDURE — 86900 BLOOD TYPING SEROLOGIC ABO: CPT | Performed by: SURGERY

## 2017-01-27 PROCEDURE — 36415 COLL VENOUS BLD VENIPUNCTURE: CPT | Performed by: SURGERY

## 2017-01-27 PROCEDURE — 86920 COMPATIBILITY TEST SPIN: CPT | Performed by: SURGERY

## 2017-01-27 NOTE — PERIOP NOTES
Spoke to Sumaya Silva from Dr. Roy's office and do not need to repeat urine. Will treat urine from 1/11/17 on DOS.

## 2017-01-27 NOTE — PERIOP NOTES
Pt in for Type and Cross lab work for pre-op. Lab obtained without difficulty and sent to lab via tube system.  Logan MERCER

## 2017-01-30 NOTE — PERIOP NOTES
Called Dr Roy's office and spoke to Ronny lenz and she confirmed that Dr Alessandra Atkinson is doing the stents not Dr Tessie Mortimer. Called [facility] and spoke to [facility] and she will call scheduling to make sure surgery is posted with Dr Catherine Silverman and she will send a new order sheet.

## 2017-01-31 RX ORDER — HEPARIN SODIUM 5000 [USP'U]/ML
5000 INJECTION, SOLUTION INTRAVENOUS; SUBCUTANEOUS ONCE
Status: CANCELLED | OUTPATIENT
Start: 2017-02-01 | End: 2017-02-01

## 2017-02-01 ENCOUNTER — HOSPITAL ENCOUNTER (INPATIENT)
Age: 57
LOS: 4 days | Discharge: HOME | DRG: 330 | End: 2017-02-05
Attending: SURGERY | Admitting: SURGERY
Payer: COMMERCIAL

## 2017-02-01 ENCOUNTER — ANESTHESIA EVENT (OUTPATIENT)
Dept: SURGERY | Age: 57
DRG: 330 | End: 2017-02-01
Payer: COMMERCIAL

## 2017-02-01 ENCOUNTER — ANESTHESIA (OUTPATIENT)
Dept: SURGERY | Age: 57
DRG: 330 | End: 2017-02-01
Payer: COMMERCIAL

## 2017-02-01 PROBLEM — K57.92 DIVERTICULITIS: Status: ACTIVE | Noted: 2017-02-01

## 2017-02-01 PROBLEM — K57.92 DIVERTICULITIS: Status: RESOLVED | Noted: 2017-02-01 | Resolved: 2017-02-01

## 2017-02-01 PROBLEM — N32.1 COLOVESICAL FISTULA: Status: ACTIVE | Noted: 2017-02-01

## 2017-02-01 PROCEDURE — 74011000250 HC RX REV CODE- 250: Performed by: FAMILY MEDICINE

## 2017-02-01 PROCEDURE — 88307 TISSUE EXAM BY PATHOLOGIST: CPT | Performed by: SURGERY

## 2017-02-01 PROCEDURE — 74011250636 HC RX REV CODE- 250/636: Performed by: FAMILY MEDICINE

## 2017-02-01 PROCEDURE — 77030035045 HC TRCR ENDOSC VRSPRT BLDLSS COVD -B: Performed by: SURGERY

## 2017-02-01 PROCEDURE — 77030018846 HC SOL IRR STRL H20 ICUM -A: Performed by: SURGERY

## 2017-02-01 PROCEDURE — 77030035051: Performed by: SURGERY

## 2017-02-01 PROCEDURE — 74011250636 HC RX REV CODE- 250/636: Performed by: SURGERY

## 2017-02-01 PROCEDURE — 77030018832 HC SOL IRR H20 ICUM -A: Performed by: SURGERY

## 2017-02-01 PROCEDURE — 77030026438 HC STYL ET INTUB CARD -A: Performed by: NURSE ANESTHETIST, CERTIFIED REGISTERED

## 2017-02-01 PROCEDURE — 74011250636 HC RX REV CODE- 250/636

## 2017-02-01 PROCEDURE — 77030011640 HC PAD GRND REM COVD -A: Performed by: SURGERY

## 2017-02-01 PROCEDURE — 77030019702 HC WRP THER MENM -C: Performed by: SURGERY

## 2017-02-01 PROCEDURE — 74011250636 HC RX REV CODE- 250/636: Performed by: ANESTHESIOLOGY

## 2017-02-01 PROCEDURE — P9045 ALBUMIN (HUMAN), 5%, 250 ML: HCPCS

## 2017-02-01 PROCEDURE — 77030002986 HC SUT PROL J&J -A: Performed by: SURGERY

## 2017-02-01 PROCEDURE — 77030016151 HC PROTCTR LNS DFOG COVD -B: Performed by: SURGERY

## 2017-02-01 PROCEDURE — 77030008771 HC TU NG SALEM SUMP -A: Performed by: NURSE ANESTHETIST, CERTIFIED REGISTERED

## 2017-02-01 PROCEDURE — 77030020153 HC PRB DOPLR DISP MIZU -C: Performed by: SURGERY

## 2017-02-01 PROCEDURE — 77030019908 HC STETH ESOPH SIMS -A: Performed by: NURSE ANESTHETIST, CERTIFIED REGISTERED

## 2017-02-01 PROCEDURE — 77030032490 HC SLV COMPR SCD KNE COVD -B: Performed by: SURGERY

## 2017-02-01 PROCEDURE — 76060000039 HC ANESTHESIA 4 TO 4.5 HR: Performed by: SURGERY

## 2017-02-01 PROCEDURE — 77030010507 HC ADH SKN DERMBND J&J -B: Performed by: SURGERY

## 2017-02-01 PROCEDURE — 77010033678 HC OXYGEN DAILY

## 2017-02-01 PROCEDURE — 77030002933 HC SUT MCRYL J&J -A: Performed by: SURGERY

## 2017-02-01 PROCEDURE — 77030002916 HC SUT ETHLN J&J -A: Performed by: SURGERY

## 2017-02-01 PROCEDURE — 0T7D8DZ DILATION OF URETHRA WITH INTRALUMINAL DEVICE, VIA NATURAL OR ARTIFICIAL OPENING ENDOSCOPIC: ICD-10-PCS | Performed by: UROLOGY

## 2017-02-01 PROCEDURE — 77030010545: Performed by: SURGERY

## 2017-02-01 PROCEDURE — 77030008756 HC TU IRR SUC STRY -B: Performed by: SURGERY

## 2017-02-01 PROCEDURE — 77030009403 HC ELECTRD ENDO MEGA -B: Performed by: SURGERY

## 2017-02-01 PROCEDURE — 77030008684 HC TU ET CUF COVD -B: Performed by: NURSE ANESTHETIST, CERTIFIED REGISTERED

## 2017-02-01 PROCEDURE — 88304 TISSUE EXAM BY PATHOLOGIST: CPT | Performed by: SURGERY

## 2017-02-01 PROCEDURE — 77030012407 HC DRN WND BARD -B: Performed by: SURGERY

## 2017-02-01 PROCEDURE — 77030034628 HC LIGASURE LAP SEAL DIV MD COVD -F: Performed by: SURGERY

## 2017-02-01 PROCEDURE — 76210000016 HC OR PH I REC 1 TO 1.5 HR: Performed by: SURGERY

## 2017-02-01 PROCEDURE — 77030002996 HC SUT SLK J&J -A: Performed by: SURGERY

## 2017-02-01 PROCEDURE — 74011000272 HC RX REV CODE- 272: Performed by: SURGERY

## 2017-02-01 PROCEDURE — 77030002966 HC SUT PDS J&J -A: Performed by: SURGERY

## 2017-02-01 PROCEDURE — 77030003029 HC SUT VCRL J&J -B: Performed by: SURGERY

## 2017-02-01 PROCEDURE — 74011000250 HC RX REV CODE- 250: Performed by: SURGERY

## 2017-02-01 PROCEDURE — 77030021508 HC STPLR ENDO GIA COVD -C: Performed by: SURGERY

## 2017-02-01 PROCEDURE — 77030012961 HC IRR KT CYSTO/TUR ICUM -A: Performed by: SURGERY

## 2017-02-01 PROCEDURE — 0DBN0ZZ EXCISION OF SIGMOID COLON, OPEN APPROACH: ICD-10-PCS | Performed by: SURGERY

## 2017-02-01 PROCEDURE — 74011250637 HC RX REV CODE- 250/637: Performed by: SURGERY

## 2017-02-01 PROCEDURE — 77030008467 HC STPLR SKN COVD -B: Performed by: SURGERY

## 2017-02-01 PROCEDURE — 77030018673: Performed by: SURGERY

## 2017-02-01 PROCEDURE — 77030031139 HC SUT VCRL2 J&J -A: Performed by: SURGERY

## 2017-02-01 PROCEDURE — C1758 CATHETER, URETERAL: HCPCS | Performed by: SURGERY

## 2017-02-01 PROCEDURE — 77030013567 HC DRN WND RESERV BARD -A: Performed by: SURGERY

## 2017-02-01 PROCEDURE — 65270000029 HC RM PRIVATE

## 2017-02-01 PROCEDURE — 77030009965 HC RELD STPLR ENDOS COVD -D: Performed by: SURGERY

## 2017-02-01 PROCEDURE — 74011000250 HC RX REV CODE- 250

## 2017-02-01 PROCEDURE — 77030013079 HC BLNKT BAIR HGGR 3M -A: Performed by: NURSE ANESTHETIST, CERTIFIED REGISTERED

## 2017-02-01 PROCEDURE — 77030035048 HC TRCR ENDOSC OPTCL COVD -B: Performed by: SURGERY

## 2017-02-01 PROCEDURE — 77030018836 HC SOL IRR NACL ICUM -A: Performed by: SURGERY

## 2017-02-01 PROCEDURE — 77030037366 HC STPLR ENDO TRI-STPLR COVD -C: Performed by: SURGERY

## 2017-02-01 PROCEDURE — 77030027138 HC INCENT SPIROMETER -A

## 2017-02-01 PROCEDURE — 76010000135 HC OR TIME 4 TO 4.5 HR: Performed by: SURGERY

## 2017-02-01 PROCEDURE — 77030009527 HC GEL PRT SYS AMR -E: Performed by: SURGERY

## 2017-02-01 RX ORDER — METOCLOPRAMIDE 10 MG/1
10 TABLET ORAL EVERY 8 HOURS
Status: DISCONTINUED | OUTPATIENT
Start: 2017-02-01 | End: 2017-02-05 | Stop reason: HOSPADM

## 2017-02-01 RX ORDER — ALVIMOPAN 12 MG/1
12 CAPSULE ORAL ONCE
Status: COMPLETED | OUTPATIENT
Start: 2017-02-01 | End: 2017-02-01

## 2017-02-01 RX ORDER — SODIUM CHLORIDE, SODIUM LACTATE, POTASSIUM CHLORIDE, CALCIUM CHLORIDE 600; 310; 30; 20 MG/100ML; MG/100ML; MG/100ML; MG/100ML
75 INJECTION, SOLUTION INTRAVENOUS CONTINUOUS
Status: DISCONTINUED | OUTPATIENT
Start: 2017-02-01 | End: 2017-02-01 | Stop reason: HOSPADM

## 2017-02-01 RX ORDER — ONDANSETRON 2 MG/ML
4 INJECTION INTRAMUSCULAR; INTRAVENOUS AS NEEDED
Status: DISCONTINUED | OUTPATIENT
Start: 2017-02-01 | End: 2017-02-01 | Stop reason: HOSPADM

## 2017-02-01 RX ORDER — SODIUM CHLORIDE 9 MG/ML
125 INJECTION, SOLUTION INTRAVENOUS CONTINUOUS
Status: DISCONTINUED | OUTPATIENT
Start: 2017-02-01 | End: 2017-02-02

## 2017-02-01 RX ORDER — DOCUSATE SODIUM 100 MG/1
100 CAPSULE, LIQUID FILLED ORAL 2 TIMES DAILY
Status: DISCONTINUED | OUTPATIENT
Start: 2017-02-01 | End: 2017-02-05 | Stop reason: HOSPADM

## 2017-02-01 RX ORDER — ALBUMIN HUMAN 50 G/1000ML
SOLUTION INTRAVENOUS AS NEEDED
Status: DISCONTINUED | OUTPATIENT
Start: 2017-02-01 | End: 2017-02-01 | Stop reason: HOSPADM

## 2017-02-01 RX ORDER — SODIUM CHLORIDE 0.9 % (FLUSH) 0.9 %
5-10 SYRINGE (ML) INJECTION EVERY 8 HOURS
Status: DISCONTINUED | OUTPATIENT
Start: 2017-02-01 | End: 2017-02-01 | Stop reason: HOSPADM

## 2017-02-01 RX ORDER — SODIUM CHLORIDE 0.9 % (FLUSH) 0.9 %
5-10 SYRINGE (ML) INJECTION AS NEEDED
Status: DISCONTINUED | OUTPATIENT
Start: 2017-02-01 | End: 2017-02-01 | Stop reason: HOSPADM

## 2017-02-01 RX ORDER — SODIUM CHLORIDE, SODIUM LACTATE, POTASSIUM CHLORIDE, CALCIUM CHLORIDE 600; 310; 30; 20 MG/100ML; MG/100ML; MG/100ML; MG/100ML
25 INJECTION, SOLUTION INTRAVENOUS CONTINUOUS
Status: DISCONTINUED | OUTPATIENT
Start: 2017-02-01 | End: 2017-02-01 | Stop reason: HOSPADM

## 2017-02-01 RX ORDER — SODIUM CHLORIDE 0.9 % (FLUSH) 0.9 %
5-10 SYRINGE (ML) INJECTION AS NEEDED
Status: DISCONTINUED | OUTPATIENT
Start: 2017-02-01 | End: 2017-02-05 | Stop reason: HOSPADM

## 2017-02-01 RX ORDER — SUCCINYLCHOLINE CHLORIDE 20 MG/ML
INJECTION INTRAMUSCULAR; INTRAVENOUS AS NEEDED
Status: DISCONTINUED | OUTPATIENT
Start: 2017-02-01 | End: 2017-02-01 | Stop reason: HOSPADM

## 2017-02-01 RX ORDER — CIPROFLOXACIN 500 MG/1
500 TABLET ORAL 2 TIMES DAILY
Status: DISCONTINUED | OUTPATIENT
Start: 2017-02-01 | End: 2017-02-05 | Stop reason: HOSPADM

## 2017-02-01 RX ORDER — FENTANYL CITRATE 50 UG/ML
50 INJECTION, SOLUTION INTRAMUSCULAR; INTRAVENOUS AS NEEDED
Status: DISCONTINUED | OUTPATIENT
Start: 2017-02-01 | End: 2017-02-01 | Stop reason: HOSPADM

## 2017-02-01 RX ORDER — GLYCOPYRROLATE 0.2 MG/ML
INJECTION INTRAMUSCULAR; INTRAVENOUS AS NEEDED
Status: DISCONTINUED | OUTPATIENT
Start: 2017-02-01 | End: 2017-02-01 | Stop reason: HOSPADM

## 2017-02-01 RX ORDER — MIDAZOLAM HYDROCHLORIDE 1 MG/ML
1 INJECTION, SOLUTION INTRAMUSCULAR; INTRAVENOUS AS NEEDED
Status: DISCONTINUED | OUTPATIENT
Start: 2017-02-01 | End: 2017-02-01 | Stop reason: HOSPADM

## 2017-02-01 RX ORDER — HYDROMORPHONE HYDROCHLORIDE 1 MG/ML
0.2 INJECTION, SOLUTION INTRAMUSCULAR; INTRAVENOUS; SUBCUTANEOUS
Status: DISCONTINUED | OUTPATIENT
Start: 2017-02-01 | End: 2017-02-01 | Stop reason: HOSPADM

## 2017-02-01 RX ORDER — METRONIDAZOLE 500 MG/100ML
500 INJECTION, SOLUTION INTRAVENOUS EVERY 8 HOURS
Status: COMPLETED | OUTPATIENT
Start: 2017-02-01 | End: 2017-02-02

## 2017-02-01 RX ORDER — SODIUM CHLORIDE 0.9 % (FLUSH) 0.9 %
5-10 SYRINGE (ML) INJECTION EVERY 8 HOURS
Status: DISCONTINUED | OUTPATIENT
Start: 2017-02-01 | End: 2017-02-05 | Stop reason: HOSPADM

## 2017-02-01 RX ORDER — ALVIMOPAN 12 MG/1
12 CAPSULE ORAL 2 TIMES DAILY
Status: DISCONTINUED | OUTPATIENT
Start: 2017-02-01 | End: 2017-02-05 | Stop reason: HOSPADM

## 2017-02-01 RX ORDER — MIDAZOLAM HYDROCHLORIDE 1 MG/ML
INJECTION, SOLUTION INTRAMUSCULAR; INTRAVENOUS AS NEEDED
Status: DISCONTINUED | OUTPATIENT
Start: 2017-02-01 | End: 2017-02-01 | Stop reason: HOSPADM

## 2017-02-01 RX ORDER — SODIUM CHLORIDE 9 MG/ML
250 INJECTION, SOLUTION INTRAVENOUS AS NEEDED
Status: DISCONTINUED | OUTPATIENT
Start: 2017-02-01 | End: 2017-02-01 | Stop reason: HOSPADM

## 2017-02-01 RX ORDER — METOPROLOL SUCCINATE 50 MG/1
50 TABLET, EXTENDED RELEASE ORAL DAILY
Status: DISCONTINUED | OUTPATIENT
Start: 2017-02-02 | End: 2017-02-05 | Stop reason: HOSPADM

## 2017-02-01 RX ORDER — HEPARIN SODIUM 5000 [USP'U]/ML
5000 INJECTION, SOLUTION INTRAVENOUS; SUBCUTANEOUS ONCE
Status: COMPLETED | OUTPATIENT
Start: 2017-02-01 | End: 2017-02-01

## 2017-02-01 RX ORDER — FENTANYL CITRATE 50 UG/ML
25 INJECTION, SOLUTION INTRAMUSCULAR; INTRAVENOUS
Status: DISCONTINUED | OUTPATIENT
Start: 2017-02-01 | End: 2017-02-01 | Stop reason: HOSPADM

## 2017-02-01 RX ORDER — HYDROMORPHONE HCL IN 0.9% NACL 15 MG/30ML
PATIENT CONTROLLED ANALGESIA VIAL INTRAVENOUS
Status: COMPLETED
Start: 2017-02-01 | End: 2017-02-01

## 2017-02-01 RX ORDER — PHENYLEPHRINE HCL IN 0.9% NACL 0.4MG/10ML
SYRINGE (ML) INTRAVENOUS AS NEEDED
Status: DISCONTINUED | OUTPATIENT
Start: 2017-02-01 | End: 2017-02-01 | Stop reason: HOSPADM

## 2017-02-01 RX ORDER — PROPOFOL 10 MG/ML
INJECTION, EMULSION INTRAVENOUS AS NEEDED
Status: DISCONTINUED | OUTPATIENT
Start: 2017-02-01 | End: 2017-02-01 | Stop reason: HOSPADM

## 2017-02-01 RX ORDER — HYDROMORPHONE HCL IN 0.9% NACL 15 MG/30ML
PATIENT CONTROLLED ANALGESIA VIAL INTRAVENOUS CONTINUOUS
Status: DISCONTINUED | OUTPATIENT
Start: 2017-02-01 | End: 2017-02-03

## 2017-02-01 RX ORDER — DIPHENHYDRAMINE HYDROCHLORIDE 50 MG/ML
12.5 INJECTION, SOLUTION INTRAMUSCULAR; INTRAVENOUS AS NEEDED
Status: DISCONTINUED | OUTPATIENT
Start: 2017-02-01 | End: 2017-02-01 | Stop reason: HOSPADM

## 2017-02-01 RX ORDER — MORPHINE SULFATE 10 MG/ML
2 INJECTION, SOLUTION INTRAMUSCULAR; INTRAVENOUS
Status: DISCONTINUED | OUTPATIENT
Start: 2017-02-01 | End: 2017-02-01 | Stop reason: HOSPADM

## 2017-02-01 RX ORDER — LIDOCAINE HYDROCHLORIDE 10 MG/ML
0.1 INJECTION, SOLUTION EPIDURAL; INFILTRATION; INTRACAUDAL; PERINEURAL AS NEEDED
Status: DISCONTINUED | OUTPATIENT
Start: 2017-02-01 | End: 2017-02-01 | Stop reason: HOSPADM

## 2017-02-01 RX ORDER — HYDROMORPHONE HYDROCHLORIDE 2 MG/ML
INJECTION, SOLUTION INTRAMUSCULAR; INTRAVENOUS; SUBCUTANEOUS AS NEEDED
Status: DISCONTINUED | OUTPATIENT
Start: 2017-02-01 | End: 2017-02-01 | Stop reason: HOSPADM

## 2017-02-01 RX ORDER — LEVOFLOXACIN 5 MG/ML
750 INJECTION, SOLUTION INTRAVENOUS ONCE
Status: COMPLETED | OUTPATIENT
Start: 2017-02-01 | End: 2017-02-01

## 2017-02-01 RX ORDER — ACETAMINOPHEN 325 MG/1
650 TABLET ORAL EVERY 6 HOURS
Status: DISPENSED | OUTPATIENT
Start: 2017-02-01 | End: 2017-02-03

## 2017-02-01 RX ORDER — OXYCODONE AND ACETAMINOPHEN 5; 325 MG/1; MG/1
1 TABLET ORAL AS NEEDED
Status: DISCONTINUED | OUTPATIENT
Start: 2017-02-01 | End: 2017-02-01 | Stop reason: HOSPADM

## 2017-02-01 RX ORDER — ONDANSETRON 2 MG/ML
4 INJECTION INTRAMUSCULAR; INTRAVENOUS
Status: DISCONTINUED | OUTPATIENT
Start: 2017-02-01 | End: 2017-02-03

## 2017-02-01 RX ORDER — NALOXONE HYDROCHLORIDE 0.4 MG/ML
0.4 INJECTION, SOLUTION INTRAMUSCULAR; INTRAVENOUS; SUBCUTANEOUS AS NEEDED
Status: DISCONTINUED | OUTPATIENT
Start: 2017-02-01 | End: 2017-02-05 | Stop reason: HOSPADM

## 2017-02-01 RX ORDER — MIDAZOLAM HYDROCHLORIDE 1 MG/ML
0.5 INJECTION, SOLUTION INTRAMUSCULAR; INTRAVENOUS
Status: DISCONTINUED | OUTPATIENT
Start: 2017-02-01 | End: 2017-02-01 | Stop reason: HOSPADM

## 2017-02-01 RX ORDER — BUPIVACAINE HYDROCHLORIDE AND EPINEPHRINE 5; 5 MG/ML; UG/ML
INJECTION, SOLUTION EPIDURAL; INTRACAUDAL; PERINEURAL AS NEEDED
Status: DISCONTINUED | OUTPATIENT
Start: 2017-02-01 | End: 2017-02-01 | Stop reason: HOSPADM

## 2017-02-01 RX ORDER — LIDOCAINE HYDROCHLORIDE 20 MG/ML
INJECTION, SOLUTION EPIDURAL; INFILTRATION; INTRACAUDAL; PERINEURAL AS NEEDED
Status: DISCONTINUED | OUTPATIENT
Start: 2017-02-01 | End: 2017-02-01 | Stop reason: HOSPADM

## 2017-02-01 RX ORDER — SODIUM CHLORIDE 9 MG/ML
50 INJECTION, SOLUTION INTRAVENOUS CONTINUOUS
Status: DISCONTINUED | OUTPATIENT
Start: 2017-02-01 | End: 2017-02-01 | Stop reason: HOSPADM

## 2017-02-01 RX ORDER — HEPARIN SODIUM 5000 [USP'U]/ML
5000 INJECTION, SOLUTION INTRAVENOUS; SUBCUTANEOUS EVERY 8 HOURS
Status: DISCONTINUED | OUTPATIENT
Start: 2017-02-01 | End: 2017-02-03

## 2017-02-01 RX ORDER — FENTANYL CITRATE 50 UG/ML
INJECTION, SOLUTION INTRAMUSCULAR; INTRAVENOUS AS NEEDED
Status: DISCONTINUED | OUTPATIENT
Start: 2017-02-01 | End: 2017-02-01 | Stop reason: HOSPADM

## 2017-02-01 RX ORDER — DEXAMETHASONE SODIUM PHOSPHATE 4 MG/ML
INJECTION, SOLUTION INTRA-ARTICULAR; INTRALESIONAL; INTRAMUSCULAR; INTRAVENOUS; SOFT TISSUE AS NEEDED
Status: DISCONTINUED | OUTPATIENT
Start: 2017-02-01 | End: 2017-02-01 | Stop reason: HOSPADM

## 2017-02-01 RX ORDER — ROCURONIUM BROMIDE 10 MG/ML
INJECTION, SOLUTION INTRAVENOUS AS NEEDED
Status: DISCONTINUED | OUTPATIENT
Start: 2017-02-01 | End: 2017-02-01 | Stop reason: HOSPADM

## 2017-02-01 RX ORDER — POTASSIUM CHLORIDE 750 MG/1
10 TABLET, FILM COATED, EXTENDED RELEASE ORAL DAILY
Status: DISCONTINUED | OUTPATIENT
Start: 2017-02-02 | End: 2017-02-05 | Stop reason: HOSPADM

## 2017-02-01 RX ORDER — NEOSTIGMINE METHYLSULFATE 1 MG/ML
INJECTION INTRAVENOUS AS NEEDED
Status: DISCONTINUED | OUTPATIENT
Start: 2017-02-01 | End: 2017-02-01 | Stop reason: HOSPADM

## 2017-02-01 RX ORDER — ONDANSETRON 2 MG/ML
INJECTION INTRAMUSCULAR; INTRAVENOUS AS NEEDED
Status: DISCONTINUED | OUTPATIENT
Start: 2017-02-01 | End: 2017-02-01 | Stop reason: HOSPADM

## 2017-02-01 RX ORDER — METRONIDAZOLE 500 MG/100ML
500 INJECTION, SOLUTION INTRAVENOUS ONCE
Status: COMPLETED | OUTPATIENT
Start: 2017-02-01 | End: 2017-02-01

## 2017-02-01 RX ORDER — ACETAMINOPHEN 10 MG/ML
INJECTION, SOLUTION INTRAVENOUS AS NEEDED
Status: DISCONTINUED | OUTPATIENT
Start: 2017-02-01 | End: 2017-02-01 | Stop reason: HOSPADM

## 2017-02-01 RX ADMIN — MIDAZOLAM HYDROCHLORIDE 2 MG: 1 INJECTION, SOLUTION INTRAMUSCULAR; INTRAVENOUS at 07:46

## 2017-02-01 RX ADMIN — FENTANYL CITRATE 50 MCG: 50 INJECTION, SOLUTION INTRAMUSCULAR; INTRAVENOUS at 07:51

## 2017-02-01 RX ADMIN — Medication 40 MCG: at 10:41

## 2017-02-01 RX ADMIN — SODIUM CHLORIDE, SODIUM LACTATE, POTASSIUM CHLORIDE, AND CALCIUM CHLORIDE: 600; 310; 30; 20 INJECTION, SOLUTION INTRAVENOUS at 09:00

## 2017-02-01 RX ADMIN — SODIUM CHLORIDE, SODIUM LACTATE, POTASSIUM CHLORIDE, AND CALCIUM CHLORIDE 25 ML/HR: 600; 310; 30; 20 INJECTION, SOLUTION INTRAVENOUS at 07:07

## 2017-02-01 RX ADMIN — Medication 80 MCG: at 11:10

## 2017-02-01 RX ADMIN — CIPROFLOXACIN HYDROCHLORIDE 500 MG: 500 TABLET, FILM COATED ORAL at 18:10

## 2017-02-01 RX ADMIN — LIDOCAINE HYDROCHLORIDE 60 MG: 20 INJECTION, SOLUTION EPIDURAL; INFILTRATION; INTRACAUDAL; PERINEURAL at 07:51

## 2017-02-01 RX ADMIN — HYDROMORPHONE HYDROCHLORIDE 0.2 MG: 2 INJECTION, SOLUTION INTRAMUSCULAR; INTRAVENOUS; SUBCUTANEOUS at 10:28

## 2017-02-01 RX ADMIN — ROCURONIUM BROMIDE 5 MG: 10 INJECTION, SOLUTION INTRAVENOUS at 07:51

## 2017-02-01 RX ADMIN — Medication: at 12:01

## 2017-02-01 RX ADMIN — Medication 40 MCG: at 09:51

## 2017-02-01 RX ADMIN — SODIUM CHLORIDE 125 ML/HR: 900 INJECTION, SOLUTION INTRAVENOUS at 19:39

## 2017-02-01 RX ADMIN — HYDROMORPHONE HYDROCHLORIDE 0.6 MG: 2 INJECTION, SOLUTION INTRAMUSCULAR; INTRAVENOUS; SUBCUTANEOUS at 11:57

## 2017-02-01 RX ADMIN — Medication 40 MCG: at 08:25

## 2017-02-01 RX ADMIN — HEPARIN SODIUM 5000 UNITS: 5000 INJECTION, SOLUTION INTRAVENOUS; SUBCUTANEOUS at 07:28

## 2017-02-01 RX ADMIN — ALBUMIN HUMAN 250 ML: 50 SOLUTION INTRAVENOUS at 08:33

## 2017-02-01 RX ADMIN — SUCCINYLCHOLINE CHLORIDE 120 MG: 20 INJECTION INTRAMUSCULAR; INTRAVENOUS at 07:51

## 2017-02-01 RX ADMIN — METOCLOPRAMIDE 10 MG: 10 TABLET ORAL at 23:13

## 2017-02-01 RX ADMIN — ONDANSETRON 4 MG: 2 INJECTION INTRAMUSCULAR; INTRAVENOUS at 10:58

## 2017-02-01 RX ADMIN — ROCURONIUM BROMIDE 10 MG: 10 INJECTION, SOLUTION INTRAVENOUS at 09:45

## 2017-02-01 RX ADMIN — Medication 40 MCG: at 10:28

## 2017-02-01 RX ADMIN — NEOSTIGMINE METHYLSULFATE 2 MG: 1 INJECTION INTRAVENOUS at 11:25

## 2017-02-01 RX ADMIN — Medication 40 MCG: at 08:08

## 2017-02-01 RX ADMIN — ROCURONIUM BROMIDE 45 MG: 10 INJECTION, SOLUTION INTRAVENOUS at 07:57

## 2017-02-01 RX ADMIN — DEXAMETHASONE SODIUM PHOSPHATE 10 MG: 4 INJECTION, SOLUTION INTRA-ARTICULAR; INTRALESIONAL; INTRAMUSCULAR; INTRAVENOUS; SOFT TISSUE at 08:09

## 2017-02-01 RX ADMIN — METOCLOPRAMIDE 10 MG: 10 TABLET ORAL at 18:10

## 2017-02-01 RX ADMIN — HYDROMORPHONE HYDROCHLORIDE 0.2 MG: 2 INJECTION, SOLUTION INTRAMUSCULAR; INTRAVENOUS; SUBCUTANEOUS at 08:20

## 2017-02-01 RX ADMIN — PROPOFOL 50 MG: 10 INJECTION, EMULSION INTRAVENOUS at 11:04

## 2017-02-01 RX ADMIN — HEPARIN SODIUM 5000 UNITS: 5000 INJECTION, SOLUTION INTRAVENOUS; SUBCUTANEOUS at 18:04

## 2017-02-01 RX ADMIN — Medication 40 MCG: at 09:58

## 2017-02-01 RX ADMIN — FENTANYL CITRATE 50 MCG: 50 INJECTION, SOLUTION INTRAMUSCULAR; INTRAVENOUS at 07:46

## 2017-02-01 RX ADMIN — Medication 40 MCG: at 09:11

## 2017-02-01 RX ADMIN — SODIUM CHLORIDE 10 MG: 9 INJECTION INTRAMUSCULAR; INTRAVENOUS; SUBCUTANEOUS at 18:38

## 2017-02-01 RX ADMIN — Medication 10 ML: at 23:15

## 2017-02-01 RX ADMIN — ACETAMINOPHEN 1000 MG: 10 INJECTION, SOLUTION INTRAVENOUS at 09:03

## 2017-02-01 RX ADMIN — ALBUMIN HUMAN 250 ML: 50 SOLUTION INTRAVENOUS at 10:50

## 2017-02-01 RX ADMIN — GLYCOPYRROLATE 0.3 MG: 0.2 INJECTION INTRAMUSCULAR; INTRAVENOUS at 11:25

## 2017-02-01 RX ADMIN — ALVIMOPAN 12 MG: 12 CAPSULE ORAL at 07:28

## 2017-02-01 RX ADMIN — PROPOFOL 150 MG: 10 INJECTION, EMULSION INTRAVENOUS at 07:51

## 2017-02-01 RX ADMIN — SODIUM CHLORIDE, SODIUM LACTATE, POTASSIUM CHLORIDE, AND CALCIUM CHLORIDE: 600; 310; 30; 20 INJECTION, SOLUTION INTRAVENOUS at 10:36

## 2017-02-01 RX ADMIN — METRONIDAZOLE 500 MG: 500 INJECTION, SOLUTION INTRAVENOUS at 07:46

## 2017-02-01 RX ADMIN — METRONIDAZOLE 500 MG: 500 INJECTION, SOLUTION INTRAVENOUS at 18:05

## 2017-02-01 RX ADMIN — Medication 40 MCG: at 09:07

## 2017-02-01 RX ADMIN — GLYCOPYRROLATE 0.1 MG: 0.2 INJECTION INTRAMUSCULAR; INTRAVENOUS at 07:51

## 2017-02-01 RX ADMIN — LEVOFLOXACIN 750 MG: 5 INJECTION, SOLUTION INTRAVENOUS at 07:56

## 2017-02-01 RX ADMIN — Medication 10 ML: at 18:05

## 2017-02-01 RX ADMIN — SODIUM CHLORIDE 125 ML/HR: 900 INJECTION, SOLUTION INTRAVENOUS at 12:01

## 2017-02-01 NOTE — PERIOP NOTES
0730 - Dr Eri Quiroz in too see pt, ask about UA & C&S order if he wanted it collected when pt was up to void and Dr. Eri Quiroz stated no due to pt has a fistula and that is why she is having UTI's so they know she already has one.

## 2017-02-01 NOTE — PROGRESS NOTES
Spoke to Dr. Kerry Ventura. Verbal order for zofran IV 4mg Q6PRN. Order entered at this time. MD aware of interaction risk and verbalized this med is still okay to order.

## 2017-02-01 NOTE — IP AVS SNAPSHOT
[address] [facility] 
[phone] Patient: Ingrid Britton MRN: [id number] [facility]:0[birth year]03[birth year] You are allergic to the following Allergen Reactions Cephalexin Hives Recent Documentation Height Weight BMI OB Status Smoking Status 1.626 m 76.1 kg 28.8 kg/m2 Hysterectomy Never Smoker Emergency Contacts Name Discharge Info Relation Home Work Mobile Virgilio Chase  Spouse [3] [phone] About your hospitalization You were admitted on:  February 1, 2017 You last received care in the:  [facility] 2 GENERAL SURGERY You were discharged on:  February 5, 2017 Unit phone number:  [phone] Why you were hospitalized Your primary diagnosis was:  Not on File Your diagnoses also included:  Diverticulitis, Colovesical Fistula Providers Seen During Your Hospitalizations Provider Role Specialty Primary office phone Yadira Briceño MD Attending Provider General Surgery [phone] Your Primary Care Physician (PCP) Primary Care Physician Office Phone Office Fax Rosalee Ramos [phone] Follow-up Information Follow up With Details Comments Contact Info Maria Ines Barnett PA-C   [facility] [address] [place] [address] 
[phone] Yadira Briceño MD Schedule an appointment as soon as possible for a visit in 3 days Follow-up this week. Call for appointment. [address] [facility] [address] [facility] [place] 
[phone] Current Discharge Medication List  
  
START taking these medications Dose & Instructions Dispensing Information Comments Morning Noon Evening Bedtime HYDROcodone-acetaminophen 5-325 mg per tablet Commonly known as:  Thelma Cortez Your next dose is: Today, Tomorrow Other:  _________ Dose:  1-2 Tab Take 1-2 Tabs by mouth every four (4) hours as needed. Max Daily Amount: 12 Tabs. Quantity:  30 Tab Refills:  0 CONTINUE these medications which have NOT CHANGED Dose & Instructions Dispensing Information Comments Morning Noon Evening Bedtime ALPRAZolam 0.5 mg tablet Commonly known as:  Cecillia Yu Your next dose is: Today, Tomorrow Other:  _________ TAKE ONE-HALF TO ONE TABLET BY MOUTH EVERY 8 HOURS AS NEEDED FOR ANXIETY, AGGRESSION, AND SLEEP **SHOULD LAST AT LEAST 30 DAYS** Quantity:  60 Tab Refills:  1  
     
   
   
   
  
 ciprofloxacin HCl 500 mg tablet Commonly known as:  CIPRO Your next dose is: Today, Tomorrow Other:  _________ Dose:  500 mg Take 1 Tab by mouth two (2) times a day for 5 days. Quantity:  10 Tab Refills:  0  
     
   
   
   
  
 lisinopril-hydroCHLOROthiazide 20-25 mg per tablet Commonly known as:  Camella Rasp Your next dose is: Today, Tomorrow Other:  _________ TAKE ONE TABLET BY MOUTH EVERY DAY Quantity:  90 Tab Refills:  1  
     
   
   
   
  
 metoprolol succinate 50 mg XL tablet Commonly known as:  TOPROL-XL Your next dose is: Today, Tomorrow Other:  _________ TAKE ONE TABLET BY MOUTH EVERY DAY Quantity:  90 Tab Refills:  1  
     
   
   
   
  
 potassium chloride 10 mEq tablet Commonly known as:  KLOR-CON M10 Your next dose is: Today, Tomorrow Other:  _________ TAKE ONE TABLET BY MOUTH EVERY DAY Quantity:  90 Tab Refills:  1 ZyrTEC 10 mg tablet Generic drug:  cetirizine Your next dose is: Today, Tomorrow Other:  _________ Dose:  10 mg Take 10 mg by mouth daily as needed. Refills:  0 Where to Get Your Medications These medications were sent to [facility]. [facility]– [place], [place] - [address] [address] Phone:  [phone]  
  ciprofloxacin HCl 500 mg tablet Information on where to get these meds will be given to you by the nurse or doctor. ! Ask your nurse or doctor about these medications HYDROcodone-acetaminophen 5-325 mg per tablet Discharge Instructions DISCHARGE SUMMARY from Nurse The following personal items are in your possession at time of discharge: 
 
Dental Appliances: None Visual Aid: None Home Medications: None Jewelry: None Clothing: None (left in in pt. room) Other Valuables: None PATIENT INSTRUCTIONS: 
 
While taking prescription Narcotics: · Limit your activities · Do not drive and operate hazardous machinery · Do not make important personal or business decisions · Do  not drink alcoholic beverages Empty Jones bag periodically throughout the day,  Use larger bedside drainage bag at night,  Change to leg bag during the day so you can be more active. Empty JAH drain at least 2 times a day and record output. Clean around catheter where it exits your body every day. Clean any crustiness off catheter tube at that time using soap and water, rinse well. Report the following to your surgeon: 
· Excessive pain, swelling, redness or odor of or around the surgical area · Temperature over 100.5 · Nausea and vomiting lasting longer than 4 hours or if unable to take medications · Any signs of decreased circulation or nerve impairment to extremity: change in color, persistent  numbness, tingling, coldness or increase pain · Any questions To prevent infection remember to use good handwashing. Your surgeons phone number is [phone]. What to do at Home: 
Recommended activity: Activity as tolerated. Recommended diet: Low fiber diet If you have surgical incisions you may shower.  Please do not scrub incision, let water flow over area and pat dry. Do not tub bathe, get in a hot tub or swim; until your wounds have healed and given the OK by your surgeon to do so. *  Please give a list of your current medications to your Primary Care Provider. *  Please update this list whenever your medications are discontinued, doses are 
    changed, or new medications (including over-the-counter products) are added. *  Please carry medication information at all times in case of emergency situations. These are general instructions for a healthy lifestyle: No smoking/ No tobacco products/ Avoid exposure to second hand smoke Surgeon General's Warning:  Quitting smoking now greatly reduces serious risk to your health. Obesity, smoking, and sedentary lifestyle greatly increases your risk for illness A healthy diet, regular physical exercise & weight monitoring are important for maintaining a healthy lifestyle You may be retaining fluid if you have a history of heart failure or if you experience any of the following symptoms:  Weight gain of 3 pounds or more overnight or 5 pounds in a week, increased swelling in our hands or feet or shortness of breath while lying flat in bed. Please call your doctor as soon as you notice any of these symptoms; do not wait until your next office visit. Recognize signs and symptoms of STROKE: 
 
F-face looks uneven A-arms unable to move or move unevenly S-speech slurred or non-existent T-time-call 911 as soon as signs and symptoms begin-DO NOT go Back to bed or wait to see if you get better-TIME IS BRAIN. The discharge information has been reviewed with the patient. The patient verbalized understanding. Diverticulitis: Care Instructions Your Care Instructions Diverticulitis occurs when pouches form in the wall of the colon and become inflamed or infected. It can be very painful. Doctors aren't sure what causes diverticulitis. There is no proof that foods such as nuts, seeds, or berries cause it or make it worse. A low-fiber diet may cause the colon to work harder to push stool forward. Pouches may form because of this extra work. It may be hard to think about healthy eating while you're in pain. But as you recover, you might think about how you can use healthy eating for overall better health. Healthy eating may help you avoid future attacks. Follow-up care is a key part of your treatment and safety. Be sure to make and go to all appointments, and call your doctor if you are having problems. It's also a good idea to know your test results and keep a list of the medicines you take. How can you care for yourself at home? · Drink plenty of fluids, enough so that your urine is light yellow or clear like water. If you have kidney, heart, or liver disease and have to limit fluids, talk with your doctor before you increase the amount of fluids you drink. · Stick to liquids or a bland diet (plain rice, bananas, dry toast or crackers, applesauce) until you are feeling better. Then you can return to regular foods and gradually increase the amount of fiber in your diet. · Use a heating pad set on low on your belly to relieve mild cramps and pain. · Get extra rest until you are feeling better. · Be safe with medicines. Read and follow all instructions on the label. ¨ If the doctor gave you a prescription medicine for pain, take it as prescribed. ¨ If you are not taking a prescription pain medicine, ask your doctor if you can take an over-the-counter medicine. · If your doctor prescribed antibiotics, take them as directed. Do not stop taking them just because you feel better. You need to take the full course of antibiotics. To prevent future attacks of diverticulitis · Avoid constipation: 
¨ Include fruits, vegetables, beans, and whole grains in your diet each day. These foods are high in fiber. ¨ Drink plenty of fluids, enough so that your urine is light yellow or clear like water. If you have kidney, heart, or liver disease and have to limit fluids, talk with your doctor before you increase the amount of fluids you drink. ¨ Get some exercise every day. Build up slowly to 30 to 60 minutes a day on 5 or more days of the week. ¨ Take a fiber supplement, such as Citrucel or Metamucil, every day if needed. Read and follow all instructions on the label. ¨ Schedule time each day for a bowel movement. Having a daily routine may help. Take your time and do not strain when having a bowel movement. When should you call for help? Call 911 anytime you think you may need emergency care. For example, call if: 
· You passed out (lost consciousness). · You vomit blood or what looks like coffee grounds. · You pass maroon or very bloody stools. Call your doctor now or seek immediate medical care if: 
· You have severe pain or swelling in your belly. · You have a new or higher fever. · You cannot keep down fluids or medicines. · You have new pain that gets worse when you move or cough. Watch closely for changes in your health, and be sure to contact your doctor if: · The symptoms you had when you first started feeling sick come back. · You do not get better as expected. Where can you learn more? Go to http://jean carlos-rufus.info/. Enter H901 in the search box to learn more about \"Diverticulitis: Care Instructions. \" Current as of: August 9, 2016 Content Version: 11.1 © 0296-0978 [employer]. Care instructions adapted under license by [employer] (which disclaims liability or warranty for this information). If you have questions about a medical condition or this instruction, always ask your healthcare professional. [address] any warranty or liability for your use of this information. Discharge Instructions Attachments/References LOW-FIBER DIET (ENGLISH) Discharge Orders None [facility] Announcement We are excited to announce that we are making your provider's discharge notes available to you in Media Radar. You will see these notes when they are completed and signed by the physician that discharged you from your recent hospital stay. If you have any questions or concerns about any information you see in Media Radar, please call the Health Information Department where you were seen or reach out to your Primary Care Provider for more information about your plan of care. Introducing [facility]! [facility] introduces Media Radar patient portal. Now you can access parts of your medical record, email your doctor's office, and request medication refills online. 1. In your internet browser, go to [email or web address] 2. Click on the First Time User? Click Here link in the Sign In box. You will see the New Member Sign Up page. 3. Enter your Media Radar Access Code exactly as it appears below. You will not need to use this code after youve completed the sign-up process. If you do not sign up before the expiration date, you must request a new code. · Media Radar Access Code: [email or web address] Expires: 2/14/2017  5:01 PM 
 
4. Enter the last four digits of your Social Security Number (xxxx) and Date of Birth (mm/dd/yyyy) as indicated and click Submit. You will be taken to the next sign-up page. 5. Create a Media Radar ID. This will be your Media Radar login ID and cannot be changed, so think of one that is secure and easy to remember. 6. Create a Media Radar password. You can change your password at any time. 7. Enter your Password Reset Question and Answer. This can be used at a later time if you forget your password. 8. Enter your e-mail address. You will receive e-mail notification when new information is available in [address] 9. Click Sign Up. You can now view and download portions of your medical record. 10. Click the Download Summary menu link to download a portable copy of your medical information. If you have questions, please visit the Frequently Asked Questions section of the [employer] website. Remember, [employer] is NOT to be used for urgent needs. For medical emergencies, dial 911. Now available from your iPhone and Android! General Information Please provide this summary of care documentation to your next provider. Patient Signature:  ____________________________________________________________ Date:  ____________________________________________________________  
  
Yenifer Ma Provider Signature:  ____________________________________________________________ Date:  ____________________________________________________________ More Information Low-Fiber Diet: Care Instructions Your Care Instructions When your bowels are irritated, you may need to limit fiber in your diet until the problem clears up. Your doctor and dietitian can help you design a low-fiber diet based on your health and what you prefer to eat. Ask your doctor how long you should stay on a low-fiber diet. Your doctor probably will have you start adding more fiber to your diet as you get better. Always talk with your doctor or dietitian before you make changes in your diet. Follow-up care is a key part of your treatment and safety. Be sure to make and go to all appointments, and call your doctor if you are having problems. Its also a good idea to know your test results and keep a list of the medicines you take. How can you care for yourself at home? · Choose white or refined grains, and avoid whole grains. That means eating white or refined cereals, breads, crackers, rice, or pasta. · Peel the skin from fruits and vegetables before you eat or cook them. Avoid eating skins, seeds, and hulls. ¨ Eat frozen or canned fruit. Low-fiber fruits include applesauce, ripe bananas, and fruit juice without pulp. ¨ Eat low-fiber vegetables, which include well-cooked vegetables and vegetable juice. · Drink or eat milk, yogurt, or other milk products, if you can digest dairy without too many problems. Your doctor may limit milk and milk products for a while. If so, he or she may recommend a calcium and vitamin D supplement. · Eat well-cooked meat, such as chicken, turkey, fish, or lean meat. You also can eat eggs and tofu. · Avoid these foods: 
¨ Bran, brown or wild rice, oatmeal, granola, corn, itzel crackers, barley, and whole wheat and other whole-grain breads, such as rye bread ¨ Cereals with more than 3 grams of fiber a serving ¨ Berries, rhubarb, prunes, prune juice, and all dried fruits ¨ Raw vegetables ¨ Cabbage, broccoli, brussels sprouts, and cauliflower ¨ Cooked dried beans, lentils, and split peas ¨ Crunchy peanut butter ¨ Ice cream with fruit pieces in it ¨ Coconut, nuts, popcorn, raisins, and seeds, or any ice cream, yogurt, or cheese with these in them Where can you learn more? Go to http://jean carlos-rufus.info/. Enter I617 in the search box to learn more about \"Low-Fiber Diet: Care Instructions. \" Current as of: July 26, 2016 Content Version: 11.1 © 1022-5025 [employer]. Care instructions adapted under license by [employer] (which disclaims liability or warranty for this information). If you have questions about a medical condition or this instruction, always ask your healthcare professional. [place] [address] any warranty or liability for your use of this information.

## 2017-02-01 NOTE — BRIEF OP NOTE
BRIEF OPERATIVE NOTE    Date of Procedure: 2/1/2017   Preoperative Diagnosis: DIVERTICULITIS  Postoperative Diagnosis: DIVERTICULITIS    Procedure(s):  LAPAROSCOPIC SIGMOID COLECTOMY, CYSTOSCOPY BILATERAL URETERAL CATHETER PLACEMENT  CYSTOSCOPY INSERTION URETERAL CATHETERS  Surgeon(s) and Role:  Panel 1:     * Constance Pinedo MD - Primary     * Miguel Ángel Bang MD - Assisting    Panel 2:     * Cooper Gomez MD - Primary            Surgical Staff:  Circ-1: Edgar Kerr RN  Circ-Relief: Mel Najera RN  Scrub Tech-1: Mauro Turpin RN-Relief: Stephen Arroyo RN  Surg Asst-1: Vicki Cataract  Float Staff: Thad Ledesma  Event Time In   Incision Start 0800   Incision Close      Anesthesia: General   Estimated Blood Loss: 150mL  Specimens:   ID Type Source Tests Collected by Time Destination   1 : Sigmoid Colon Preservative Sigmoid  Constance Pinedo MD 2/1/2017 1014 Pathology   2 : Distal Donut Preservative Rectum  Constance Pinedo MD 2/1/2017 1039 Pathology   3 : Proximal Donut Preservative Sigmoid  Constance Pinedo MD 2/1/2017 1040 Pathology      Findings: dense sigmoid disease, attached to bladder.      Complications: none immediate  Implants: * No implants in log *      [id number]

## 2017-02-01 NOTE — PERIOP NOTES
Stents placed by Dr. Vena Ganser at 0504. Stents removed with tips in tact by Dr. Kasi Wilson at 5339.

## 2017-02-01 NOTE — IP AVS SNAPSHOT
Current Discharge Medication List  
  
Take these medications at their scheduled times Dose & Instructions Dispensing Information Comments Morning Noon Evening Bedtime  
 ciprofloxacin HCl 500 mg tablet Commonly known as:  CIPRO Your next dose is: Today, Tomorrow Other:  ____________ Dose:  500 mg Take 1 Tab by mouth two (2) times a day for 5 days. Quantity:  10 Tab Refills:  0 Take these medications as needed Dose & Instructions Dispensing Information Comments Morning Noon Evening Bedtime HYDROcodone-acetaminophen 5-325 mg per tablet Commonly known as:  Martha Fothergill Your next dose is: Today, Tomorrow Other:  ____________ Dose:  1-2 Tab Take 1-2 Tabs by mouth every four (4) hours as needed. Max Daily Amount: 12 Tabs. Quantity:  30 Tab Refills:  0 ZyrTEC 10 mg tablet Generic drug:  cetirizine Your next dose is: Today, Tomorrow Other:  ____________ Dose:  10 mg Take 10 mg by mouth daily as needed. Refills:  0 Take these medications as directed Dose & Instructions Dispensing Information Comments Morning Noon Evening Bedtime ALPRAZolam 0.5 mg tablet Commonly known as:  Stewart Palmer Your next dose is: Today, Tomorrow Other:  ____________ TAKE ONE-HALF TO ONE TABLET BY MOUTH EVERY 8 HOURS AS NEEDED FOR ANXIETY, AGGRESSION, AND SLEEP **SHOULD LAST AT LEAST 30 DAYS** Quantity:  60 Tab Refills:  1  
     
   
   
   
  
 lisinopril-hydroCHLOROthiazide 20-25 mg per tablet Commonly known as:  Geo Hou Your next dose is: Today, Tomorrow Other:  ____________ TAKE ONE TABLET BY MOUTH EVERY DAY Quantity:  90 Tab Refills:  1  
     
   
   
   
  
 metoprolol succinate 50 mg XL tablet Commonly known as:  TOPROL-XL  
   
 Your next dose is: Today, Tomorrow Other:  ____________ TAKE ONE TABLET BY MOUTH EVERY DAY Quantity:  90 Tab Refills:  1  
     
   
   
   
  
 potassium chloride 10 mEq tablet Commonly known as:  KLOR-CON M10 Your next dose is: Today, Tomorrow Other:  ____________ TAKE ONE TABLET BY MOUTH EVERY DAY Quantity:  90 Tab Refills:  1 Where to Get Your Medications These medications were sent to [facility] [address] [place] [address], [address] [place] [address], [address] [address] Phone:  [phone]  
  ciprofloxacin HCl 500 mg tablet Information about where to get these medications is not yet available ! Ask your nurse or doctor about these medications HYDROcodone-acetaminophen 5-325 mg per tablet

## 2017-02-01 NOTE — PROGRESS NOTES
End of Shift Nursing Note    Bedside shift change report given to Man Elizabeth RN (oncoming nurse) by Jordon Gayle (offgoing nurse). Report included the following information SBAR, Kardex, OR Summary, Procedure Summary, Intake/Output, MAR, Accordion and Recent Results. Significant changes during shift:    Pt arrived via stretcher from PACU A&O x4, arrousable, PCA use, lap sites x3 and midline with dermabond CDI. Jones draining red, JAH bulb with 48 serosang out. Pt with nausea, not relieved by zofran, compazine ordered. RN spoke with pharmacy due to a potential reaction with reglan, pharmacist states he will verify this med and RNs are to watch for signs of tardiff dyskinesa. None none     Number times ambulated in hallway past shift: 0      Number of times OOB to chair past shift: 0    POD #: 0     Vital Signs:    Temp: 97.7 °F (36.5 °C)     Pulse (Heart Rate): 81     BP: 127/80     Resp Rate: 16     O2 Sat (%): 100 %    Lines & Drains:     Urinary Catheter? YES   Placement Date: 2-1-17   Medical Necessity: yes      NG tube [] in [] removed [x] not applicable   Drains [] in [] removed [x] not applicable     Skin Integrity:      Wounds: yes   Dressings Present: yes    Wound Concerns: yes      GI:    Current diet:       Nausea: yes  Vomiting: NO  Bowel Sounds: YES  Flatus: NO  Last Bowel Movement: yesterday       Respiratory:  Supplemental O2: YES      Device: NC   via 2 Liters/min     Incentive Spirometer: YES    Coughing and Deep Breathing: YES  Oral Care: YES  Understanding (patient/family education): YES   Getting out of bed: NO  Head of bed elevation: YES    Patient Safety:    Falls Score: 3  Mobility Score: 1  Bed Alarm On? NO  Sitter? NO      Opportunity for questions and clarification was given to oncoming nurse. Patient bed is in low position, side rails are up x 2, door & observation blinds open as needed, call bell within reach and patient not in distress.     Girish Kerr RN

## 2017-02-01 NOTE — ANESTHESIA POSTPROCEDURE EVALUATION
Post-Anesthesia Evaluation and Assessment    Patient: Jakob Garcia MRN: [id number]  SSN: [id number]    YOB: 1960  Age: 60 y.o. Sex: female       Cardiovascular Function/Vital Signs  Visit Vitals    /70 (BP 1 Location: Left arm, BP Patient Position: At rest)    Pulse 70    Temp 36.5 °C (97.7 °F)    Resp 12    Ht 5' 4\" (1.626 m)    Wt 76.1 kg (167 lb 12.3 oz)    SpO2 100%    BMI 28.8 kg/m2       Patient is status post general anesthesia for Procedure(s):  LAPAROSCOPIC SIGMOID COLECTOMY, CYSTOSCOPY BILATERAL URETERAL CATHETER PLACEMENT  CYSTOSCOPY INSERTION URETERAL CATHETERS. Nausea/Vomiting: None    Postoperative hydration reviewed and adequate. Pain:  Pain Scale 1: Visual (02/01/17 1230)  Pain Intensity 1: 0 (02/01/17 1230)   Managed    Neurological Status:   Neuro (WDL): Exceptions to WDL (02/01/17 1152)  Neuro  Neurologic State: Drowsy; Eyes open spontaneously; Eyes open to voice (02/01/17 1214)  Orientation Level: Oriented to person;Oriented to place;Oriented to situation (02/01/17 1219)  Cognition: Appropriate for age attention/concentration; Appropriate safety awareness; Follows commands (02/01/17 1219)  Speech: Appropriate for age;Clear (02/01/17 0655)  LUE Motor Response: Purposeful (02/01/17 1152)  LLE Motor Response: Purposeful (02/01/17 1152)  RUE Motor Response: Purposeful (02/01/17 1152)  RLE Motor Response: Purposeful (02/01/17 1152)   At baseline    Mental Status and Level of Consciousness: Alert and oriented     Pulmonary Status:   O2 Device: Nasal cannula (02/01/17 1233)   Adequate oxygenation and airway patent    Complications related to anesthesia: None    Post-anesthesia assessment completed.  No concerns    Signed By: Scar Wilson DO     February 1, 2017

## 2017-02-01 NOTE — PERIOP NOTES
Called Dr. Darwin Miranda due to I omitted to draw the ISTAT chem 8, per Dr. Darwin Miranda OK to proceed without chem 8 ISTAT due to pt labs on 1/11/17 were OK.  Notified TAPAN Xiong

## 2017-02-01 NOTE — PROGRESS NOTES
Pharmacy Antimicrobials Stewardship     Indication for Antimicrobials: ? UTI ? Current Regimen of Each Antimicrobial (Start Day & Day of Therapy):  Cipro 750 mg po x 1 then 500 bid . Has PTA list of 500 mg q12h       Significant Cultures: None       Creatinine Clearance (Creatinine Clearance (ml/min)): Pending for tomorrow     Impression/Plan: Please indicate if Cipro is still needed. There are no cultures, pt's PTA list indicates that she was on Cipro 500 mg BID since 1/25/17  Pt is too groggy to interview        Pharmacy will follow daily and adjust medications as appropriate for renal function and/or serum levels.     Thank you,  Lane Vanegas, [facility]

## 2017-02-01 NOTE — ANESTHESIA PREPROCEDURE EVALUATION
Anesthetic History               Review of Systems / Medical History  Patient summary reviewed, nursing notes reviewed and pertinent labs reviewed    Pulmonary  Within defined limits                 Neuro/Psych         Psychiatric history     Cardiovascular    Hypertension          Hyperlipidemia    Exercise tolerance: >4 METS     GI/Hepatic/Renal               Comments: Microscopic hematuria       Diverticulitis of intestine with abscess      Diverticulitis        Endo/Other  Within defined limits           Other Findings              Physical Exam    Airway  Mallampati: III  TM Distance: 4 - 6 cm  Neck ROM: normal range of motion   Mouth opening: Normal     Cardiovascular  Regular rate and rhythm,  S1 and S2 normal,  no murmur, click, rub, or gallop  Rhythm: regular  Rate: normal         Dental    Dentition: Caps/crowns     Pulmonary  Breath sounds clear to auscultation               Abdominal  GI exam deferred       Other Findings            Anesthetic Plan    ASA: 2  Anesthesia type: general          Induction: Intravenous  Anesthetic plan and risks discussed with: Patient

## 2017-02-01 NOTE — PERIOP NOTES
TRANSFER - OUT REPORT:    Verbal report given to Lam Melchor RN(name) on Efren Cramer  being transferred to 2101(unit) for routine progression of care       Report consisted of patients Situation, Background, Assessment and   Recommendations(SBAR). Information from the following report(s) SBAR, OR Summary, Procedure Summary, Intake/Output and MAR was reviewed with the receiving nurse. Opportunity for questions and clarification was provided.       Patient transported with:   O2 @ 2 liters  Registered Nurse

## 2017-02-01 NOTE — H&P
H&P    Assessment:   DIVERTICULITIS    Body mass index is 28.8 kg/(m^2). Plan:   LAPAROSCOPIC SIGMOID COLECTOMY, CYSTOSCOPY BILATERAL URETERAL CATHETER PLACEMENT (N/A )  CYSTOSCOPY INSERTION URETERAL CATHETERS Discussed the risk of surgery including bleeding, infection, injury to adjacent structures, and the risks of general anesthetic. The patient understands the risks; any and all questions were answered to the patient's satisfaction. Subjective:      Gina Ndiaye is a 60 y.o. female who presents for above procedure. See prior office H&P for details. Objective:   Blood pressure 151/83, pulse 74, temperature 97.6 °F (36.4 °C), resp. rate 16, height 5' 4\" (1.626 m), weight 76.1 kg (167 lb 12.3 oz), last menstrual period 2005, SpO2 100 %.   Temp (24hrs), Av.6 °F (36.4 °C), Min:97.6 °F (36.4 °C), Max:97.6 °F (36.4 °C)      Physical Exam:  PHYSICAL EXAM:    Chest:   [x]   CTA bialterally, no wheezing/rhonchi/rales/crackles    []   wheezing     []   rhonchi     []   crackles     []   use of accessory muscles    Heart:  [x]   regular rate and rhythm     [x]   No murmurs/rubs/gallops    []   irregular rhythm     []   Murmur     []   Rubs     []   Gallops    Breast: [x]   deferred       ABD:    [x]   soft    [x]   non distended   [x]    non tender   [x]   NABS            Past Medical History   Diagnosis Date    Diverticulosis of large intestine with hemorrhage 2014     distal sigmoid colon, mid-sigmoid colon, descending colon - colonoscopy 16    Hyperlipemia       2016; not on medication    Hypertension     Hypovitaminosis D 2015     level normal 2016    Microscopic hematuria 2015    Psychiatric disorder      anxiety     Past Surgical History   Procedure Laterality Date    Hx hysterectomy  3/17/2005     TVH    Hx gyn       ectopic pregnancy    Hx colonoscopy  2015      Family History   Problem Relation Age of Onset    Hypertension Mother    [facility] Hypertension Father     Hypertension Sister     Hypertension Brother     Hypertension Sister     Hypertension Sister     Hypertension Sister      Social History     Social History    Marital status:      Spouse name: N/A    Number of children: N/A    Years of education: N/A     Occupational History    caregiver      Social History Main Topics    Smoking status: Never Smoker    Smokeless tobacco: Never Used    Alcohol use 1.2 oz/week     2 Glasses of wine per week      Comment: occassionally:     Drug use: Yes     Special: Marijuana      Comment: last used in December 2016 - per pt 2/1/17    Sexual activity: Yes     Partners: Male     Birth control/ protection: Surgical     Other Topics Concern    None     Social History Narrative      Prior to Admission medications    Medication Sig Start Date End Date Taking? Authorizing Provider   lisinopril-hydroCHLOROthiazide (PRINZIDE, ZESTORETIC) 20-25 mg per tablet TAKE ONE TABLET BY MOUTH EVERY DAY 1/16/17  Yes Katherine Dorado PA-C   metoprolol succinate (TOPROL-XL) 50 mg XL tablet TAKE ONE TABLET BY MOUTH EVERY DAY 1/16/17  Yes Katherine Dorado PA-C   potassium chloride (KLOR-CON M10) 10 mEq tablet TAKE ONE TABLET BY MOUTH EVERY DAY 1/16/17  Yes Katherine Dorado PA-C   ciprofloxacin HCl (CIPRO) 500 mg tablet Take 1 Tab by mouth two (2) times a day. 1/16/17  Yes Katherine Dorado PA-C   ALPRAZolam Kim Allis) 0.5 mg tablet TAKE ONE-HALF TO ONE TABLET BY MOUTH EVERY 8 HOURS AS NEEDED FOR ANXIETY, AGGRESSION, AND SLEEP **SHOULD LAST AT LEAST 30 DAYS** 5/3/16  Yes Vinny Hankins NP   cetirizine (ZYRTEC) 10 mg tablet Take 10 mg by mouth daily as needed.    Yes Historical Provider     ALLERGIES:    Allergies   Allergen Reactions    Cephalexin Hives       Review of Systems:  (see office H&P, changes noted below)  [x]         None                Signed By: Hal Shah MD     February 1, 2017

## 2017-02-02 LAB
ANION GAP BLD CALC-SCNC: 10 MMOL/L (ref 5–15)
BASOPHILS # BLD AUTO: 0 K/UL (ref 0–0.1)
BASOPHILS # BLD: 0 % (ref 0–1)
BUN SERPL-MCNC: 8 MG/DL (ref 6–20)
BUN/CREAT SERPL: 13 (ref 12–20)
CALCIUM SERPL-MCNC: 7.9 MG/DL (ref 8.5–10.1)
CHLORIDE SERPL-SCNC: 104 MMOL/L (ref 97–108)
CO2 SERPL-SCNC: 25 MMOL/L (ref 21–32)
CREAT SERPL-MCNC: 0.62 MG/DL (ref 0.55–1.02)
EOSINOPHIL # BLD: 0 K/UL (ref 0–0.4)
EOSINOPHIL NFR BLD: 0 % (ref 0–7)
ERYTHROCYTE [DISTWIDTH] IN BLOOD BY AUTOMATED COUNT: 13.4 % (ref 11.5–14.5)
GLUCOSE SERPL-MCNC: 115 MG/DL (ref 65–100)
HCT VFR BLD AUTO: 30.9 % (ref 35–47)
HGB BLD-MCNC: 10.2 G/DL (ref 11.5–16)
LYMPHOCYTES # BLD AUTO: 11 % (ref 12–49)
LYMPHOCYTES # BLD: 0.8 K/UL (ref 0.8–3.5)
MCH RBC QN AUTO: 28.8 PG (ref 26–34)
MCHC RBC AUTO-ENTMCNC: 33 G/DL (ref 30–36.5)
MCV RBC AUTO: 87.3 FL (ref 80–99)
MONOCYTES # BLD: 0.5 K/UL (ref 0–1)
MONOCYTES NFR BLD AUTO: 6 % (ref 5–13)
NEUTS SEG # BLD: 6.5 K/UL (ref 1.8–8)
NEUTS SEG NFR BLD AUTO: 83 % (ref 32–75)
PLATELET # BLD AUTO: 150 K/UL (ref 150–400)
POTASSIUM SERPL-SCNC: 3.5 MMOL/L (ref 3.5–5.1)
RBC # BLD AUTO: 3.54 M/UL (ref 3.8–5.2)
SODIUM SERPL-SCNC: 139 MMOL/L (ref 136–145)
WBC # BLD AUTO: 7.8 K/UL (ref 3.6–11)

## 2017-02-02 PROCEDURE — 74011250636 HC RX REV CODE- 250/636: Performed by: SURGERY

## 2017-02-02 PROCEDURE — 74011250636 HC RX REV CODE- 250/636: Performed by: FAMILY MEDICINE

## 2017-02-02 PROCEDURE — 74011250637 HC RX REV CODE- 250/637: Performed by: SURGERY

## 2017-02-02 PROCEDURE — 74011000250 HC RX REV CODE- 250: Performed by: FAMILY MEDICINE

## 2017-02-02 PROCEDURE — 36415 COLL VENOUS BLD VENIPUNCTURE: CPT | Performed by: SURGERY

## 2017-02-02 PROCEDURE — 77010033678 HC OXYGEN DAILY

## 2017-02-02 PROCEDURE — 85025 COMPLETE CBC W/AUTO DIFF WBC: CPT | Performed by: SURGERY

## 2017-02-02 PROCEDURE — 74011000250 HC RX REV CODE- 250: Performed by: SURGERY

## 2017-02-02 PROCEDURE — 80048 BASIC METABOLIC PNL TOTAL CA: CPT | Performed by: SURGERY

## 2017-02-02 PROCEDURE — 65270000029 HC RM PRIVATE

## 2017-02-02 RX ORDER — DEXTROSE, SODIUM CHLORIDE, AND POTASSIUM CHLORIDE 5; .45; .15 G/100ML; G/100ML; G/100ML
50 INJECTION INTRAVENOUS CONTINUOUS
Status: DISCONTINUED | OUTPATIENT
Start: 2017-02-02 | End: 2017-02-05 | Stop reason: HOSPADM

## 2017-02-02 RX ADMIN — CIPROFLOXACIN HYDROCHLORIDE 500 MG: 500 TABLET, FILM COATED ORAL at 09:04

## 2017-02-02 RX ADMIN — ALVIMOPAN 12 MG: 12 CAPSULE ORAL at 09:04

## 2017-02-02 RX ADMIN — ACETAMINOPHEN 650 MG: 325 TABLET, FILM COATED ORAL at 00:39

## 2017-02-02 RX ADMIN — METOCLOPRAMIDE 10 MG: 10 TABLET ORAL at 16:45

## 2017-02-02 RX ADMIN — HYDROCHLOROTHIAZIDE: 25 TABLET ORAL at 09:08

## 2017-02-02 RX ADMIN — DEXTROSE MONOHYDRATE, SODIUM CHLORIDE, AND POTASSIUM CHLORIDE 100 ML/HR: 50; 4.5; 1.49 INJECTION, SOLUTION INTRAVENOUS at 21:04

## 2017-02-02 RX ADMIN — Medication 10 ML: at 21:06

## 2017-02-02 RX ADMIN — DOCUSATE SODIUM 100 MG: 100 CAPSULE, LIQUID FILLED ORAL at 09:04

## 2017-02-02 RX ADMIN — Medication 10 ML: at 16:46

## 2017-02-02 RX ADMIN — CIPROFLOXACIN HYDROCHLORIDE 500 MG: 500 TABLET, FILM COATED ORAL at 17:21

## 2017-02-02 RX ADMIN — HEPARIN SODIUM 5000 UNITS: 5000 INJECTION, SOLUTION INTRAVENOUS; SUBCUTANEOUS at 09:09

## 2017-02-02 RX ADMIN — METOPROLOL SUCCINATE 50 MG: 50 TABLET, EXTENDED RELEASE ORAL at 09:04

## 2017-02-02 RX ADMIN — METOCLOPRAMIDE 10 MG: 10 TABLET ORAL at 05:20

## 2017-02-02 RX ADMIN — DEXTROSE MONOHYDRATE, SODIUM CHLORIDE, AND POTASSIUM CHLORIDE 100 ML/HR: 50; 4.5; 1.49 INJECTION, SOLUTION INTRAVENOUS at 16:43

## 2017-02-02 RX ADMIN — ACETAMINOPHEN 650 MG: 325 TABLET, FILM COATED ORAL at 17:22

## 2017-02-02 RX ADMIN — METRONIDAZOLE 500 MG: 500 INJECTION, SOLUTION INTRAVENOUS at 00:39

## 2017-02-02 RX ADMIN — ONDANSETRON 4 MG: 2 INJECTION INTRAMUSCULAR; INTRAVENOUS at 20:45

## 2017-02-02 RX ADMIN — ACETAMINOPHEN 650 MG: 325 TABLET, FILM COATED ORAL at 11:49

## 2017-02-02 RX ADMIN — HEPARIN SODIUM 5000 UNITS: 5000 INJECTION, SOLUTION INTRAVENOUS; SUBCUTANEOUS at 16:45

## 2017-02-02 RX ADMIN — DOCUSATE SODIUM 100 MG: 100 CAPSULE, LIQUID FILLED ORAL at 17:22

## 2017-02-02 RX ADMIN — Medication 10 ML: at 05:22

## 2017-02-02 RX ADMIN — POTASSIUM CHLORIDE 10 MEQ: 750 TABLET, FILM COATED, EXTENDED RELEASE ORAL at 09:04

## 2017-02-02 RX ADMIN — METRONIDAZOLE 500 MG: 500 INJECTION, SOLUTION INTRAVENOUS at 09:03

## 2017-02-02 RX ADMIN — ALVIMOPAN 12 MG: 12 CAPSULE ORAL at 17:22

## 2017-02-02 RX ADMIN — SODIUM CHLORIDE 125 ML/HR: 900 INJECTION, SOLUTION INTRAVENOUS at 14:43

## 2017-02-02 RX ADMIN — ACETAMINOPHEN 650 MG: 325 TABLET, FILM COATED ORAL at 05:21

## 2017-02-02 RX ADMIN — SODIUM CHLORIDE 125 ML/HR: 900 INJECTION, SOLUTION INTRAVENOUS at 05:21

## 2017-02-02 RX ADMIN — METOCLOPRAMIDE 10 MG: 10 TABLET ORAL at 21:01

## 2017-02-02 RX ADMIN — SODIUM CHLORIDE 10 MG: 9 INJECTION INTRAMUSCULAR; INTRAVENOUS; SUBCUTANEOUS at 11:49

## 2017-02-02 NOTE — PROGRESS NOTES
Spiritual Care Partner Volunteer visited patient in Gen Surg on February 2, 2017.   Documented by:  BAILEY Medina, [facility], [address]     Paging Service  [phone])

## 2017-02-02 NOTE — PROGRESS NOTES
End of Shift Nursing Note    Bedside shift change report given to Deborah Moe (oncoming nurse) by Keith Sanabria RN (offgoing nurse). Report included the following information SBAR, Kardex, Procedure Summary, Intake/Output and Recent Results. Zone Phone:   [phone]    Significant changes during shift:    none   Non-emergent issues for physician to address:   non     Number times ambulated in hallway past shift: 0      Number of times OOB to chair past shift: 0    POD #: 1     Vital Signs:    Temp: 98.4 °F (36.9 °C)     Pulse (Heart Rate): 83     BP: 111/70     Resp Rate: 18     O2 Sat (%): 98 %    Lines & Drains:     Urinary Catheter? YES   Placement Date: 2/1/2017   Medical Necessity: yes  Central Line? NO   Placement Date:    Medical Necessity:   PICC Line? NO   Placement Date:    Medical Necessity:     NG tube [] in [] removed [x] not applicable   Drains [x] in [] removed [] not applicable     Skin Integrity:      Wounds: yes   Dressings Present: yes    Wound Concerns: no      GI:    Current diet:  DIET CLEAR LIQUID    Nausea: NO  Vomiting: NO  Bowel Sounds: YES  Flatus: NO  Last Bowel Movement: yesterday   Appearance:     Respiratory:  Supplemental O2: YES      Device: nasal cannula   via 2 Liters/min     Incentive Spirometer: YES  Volume: 825  Coughing and Deep Breathing: YES  Oral Care: YES  Understanding (patient/family education): YES   Getting out of bed: NO  Head of bed elevation: YES    Patient Safety:    Falls Score: 1  Mobility Score: 1  Bed Alarm On? NO  Sitter? NO      Opportunity for questions and clarification was given to oncoming nurse. Patient bed is in low position, side rails are up x 2, door & observation blinds open as needed, call bell within reach and patient not in distress.     [place]

## 2017-02-02 NOTE — PROGRESS NOTES
Pharmacy:  Ciprofloxacin 500mg PO BID until 1/25 for UTI. Please consider discontinuing. Thanks.   Sandhya Kessler, [facility]

## 2017-02-02 NOTE — PROGRESS NOTES
Subjective:    Feels ok. Walked already today. No flatus. Objective:    Blood pressure 124/79, pulse 73, temperature 98.4 °F (36.9 °C), resp. rate 16, height 5' 4\" (1.626 m), weight 76.1 kg (167 lb 12.3 oz), last menstrual period 02/12/2005, SpO2 96 %. Drains - serosanguinous. Exam - A&O, NAD.  CTAB, RRR. Abd soft, approp TTP, incisions CDI, quiet. Assessment: LAPAROSCOPIC SIGMOID COLECTOMY on  2/1/2017  There was colovesical fistula clinically and on cysto, but only pinpoint grossly intraoperatively. Active Hospital Problems    Diagnosis Date Noted    Diverticulitis 02/01/2017    Colovesical fistula 02/01/2017     Plan:  - hyman will remain even at discharge. Home with leg bag. Will be taken out in the office.    - same for JAH. - clears until regular flatus then advance as tolerated to GI lite. Home on GI lite. - abx for bladder infection secondary to fistula. - presently awaiting return of GI fxn.       Keisha Andrews MD

## 2017-02-03 LAB
HCT VFR BLD AUTO: 28.9 % (ref 35–47)
HGB BLD-MCNC: 9.6 G/DL (ref 11.5–16)

## 2017-02-03 PROCEDURE — 74011250636 HC RX REV CODE- 250/636: Performed by: SURGERY

## 2017-02-03 PROCEDURE — 65270000029 HC RM PRIVATE

## 2017-02-03 PROCEDURE — 77010033678 HC OXYGEN DAILY

## 2017-02-03 PROCEDURE — 74011250636 HC RX REV CODE- 250/636: Performed by: FAMILY MEDICINE

## 2017-02-03 PROCEDURE — 85018 HEMOGLOBIN: CPT | Performed by: SURGERY

## 2017-02-03 PROCEDURE — 74011000250 HC RX REV CODE- 250: Performed by: FAMILY MEDICINE

## 2017-02-03 PROCEDURE — 74011250637 HC RX REV CODE- 250/637: Performed by: SURGERY

## 2017-02-03 RX ORDER — ENOXAPARIN SODIUM 100 MG/ML
40 INJECTION SUBCUTANEOUS EVERY 24 HOURS
Status: DISCONTINUED | OUTPATIENT
Start: 2017-02-03 | End: 2017-02-05 | Stop reason: HOSPADM

## 2017-02-03 RX ORDER — ONDANSETRON 2 MG/ML
8 INJECTION INTRAMUSCULAR; INTRAVENOUS
Status: DISCONTINUED | OUTPATIENT
Start: 2017-02-03 | End: 2017-02-05 | Stop reason: HOSPADM

## 2017-02-03 RX ORDER — MORPHINE SULFATE 5 MG/ML
INJECTION, SOLUTION INTRAVENOUS
Status: DISCONTINUED | OUTPATIENT
Start: 2017-02-03 | End: 2017-02-05 | Stop reason: HOSPADM

## 2017-02-03 RX ADMIN — ACETAMINOPHEN 650 MG: 325 TABLET, FILM COATED ORAL at 05:02

## 2017-02-03 RX ADMIN — Medication 10 ML: at 04:24

## 2017-02-03 RX ADMIN — CIPROFLOXACIN HYDROCHLORIDE 500 MG: 500 TABLET, FILM COATED ORAL at 08:36

## 2017-02-03 RX ADMIN — METOCLOPRAMIDE 10 MG: 10 TABLET ORAL at 05:03

## 2017-02-03 RX ADMIN — SODIUM CHLORIDE 10 MG: 9 INJECTION INTRAMUSCULAR; INTRAVENOUS; SUBCUTANEOUS at 17:33

## 2017-02-03 RX ADMIN — ONDANSETRON 4 MG: 2 INJECTION INTRAMUSCULAR; INTRAVENOUS at 04:24

## 2017-02-03 RX ADMIN — ACETAMINOPHEN 650 MG: 325 TABLET, FILM COATED ORAL at 00:58

## 2017-02-03 RX ADMIN — ACETAMINOPHEN 650 MG: 325 TABLET, FILM COATED ORAL at 12:25

## 2017-02-03 RX ADMIN — HYDROCHLOROTHIAZIDE: 25 TABLET ORAL at 08:36

## 2017-02-03 RX ADMIN — POTASSIUM CHLORIDE 10 MEQ: 750 TABLET, FILM COATED, EXTENDED RELEASE ORAL at 08:36

## 2017-02-03 RX ADMIN — DOCUSATE SODIUM 100 MG: 100 CAPSULE, LIQUID FILLED ORAL at 08:36

## 2017-02-03 RX ADMIN — HEPARIN SODIUM 5000 UNITS: 5000 INJECTION, SOLUTION INTRAVENOUS; SUBCUTANEOUS at 08:36

## 2017-02-03 RX ADMIN — HEPARIN SODIUM 5000 UNITS: 5000 INJECTION, SOLUTION INTRAVENOUS; SUBCUTANEOUS at 00:58

## 2017-02-03 RX ADMIN — Medication: at 19:07

## 2017-02-03 RX ADMIN — ALVIMOPAN 12 MG: 12 CAPSULE ORAL at 08:36

## 2017-02-03 RX ADMIN — ONDANSETRON 4 MG: 2 INJECTION INTRAMUSCULAR; INTRAVENOUS at 09:58

## 2017-02-03 RX ADMIN — DOCUSATE SODIUM 100 MG: 100 CAPSULE, LIQUID FILLED ORAL at 17:33

## 2017-02-03 RX ADMIN — Medication 10 ML: at 15:16

## 2017-02-03 RX ADMIN — CIPROFLOXACIN HYDROCHLORIDE 500 MG: 500 TABLET, FILM COATED ORAL at 17:33

## 2017-02-03 RX ADMIN — ENOXAPARIN SODIUM 40 MG: 40 INJECTION SUBCUTANEOUS at 13:12

## 2017-02-03 RX ADMIN — Medication 10 ML: at 21:44

## 2017-02-03 RX ADMIN — ALVIMOPAN 12 MG: 12 CAPSULE ORAL at 17:33

## 2017-02-03 RX ADMIN — METOPROLOL SUCCINATE 50 MG: 50 TABLET, EXTENDED RELEASE ORAL at 08:36

## 2017-02-03 RX ADMIN — METOCLOPRAMIDE 10 MG: 10 TABLET ORAL at 13:12

## 2017-02-03 RX ADMIN — METOCLOPRAMIDE 10 MG: 10 TABLET ORAL at 21:44

## 2017-02-03 NOTE — PROGRESS NOTES
Subjective:    Feels ok. Some nausea -- thinks its when she uses the PCA. Objective:    Blood pressure 114/74, pulse 83, temperature 99.2 °F (37.3 °C), resp. rate 18, height 5' 4\" (1.626 m), weight 76.1 kg (167 lb 12.3 oz), last menstrual period 02/12/2005, SpO2 92 %. Drains - serosanguinous. Exam - A&O, NAD.  CTAB, RRR. Abd soft, approp TTP, incisions CDI, quiet. Assessment: LAPAROSCOPIC SIGMOID COLECTOMY on  2/1/2017  There was colovesical fistula clinically and on cysto, but only pinpoint grossly intraoperatively. Active Hospital Problems    Diagnosis Date Noted    Diverticulitis 02/01/2017    Colovesical fistula 02/01/2017     Plan:  - hyman will remain even at discharge. Home with leg bag. Will be taken out in the office.    - same for JAH. - clears until regular flatus then advance as tolerated to GI lite. Home on GI lite. - abx for bladder infection secondary to fistula which was JUST repaired. - may change PCA. We'll see how she does with nausea today. - presently awaiting return of GI fxn.       Dayan Overton MD

## 2017-02-03 NOTE — PROGRESS NOTES
End of Shift Nursing Note    Bedside shift change report given to Tanja Obrien (oncoming nurse) by Leia Steve (offgoing nurse). Report included the following information SBAR, Kardex, Procedure Summary and Recent Results. Zone Phone:   [phone]    Significant changes during shift:    none   Non-emergent issues for physician to address:   none     Number times ambulated in hallway past shift: 1      Number of times OOB to chair past shift: 1    POD #: 1     Vital Signs:    Temp: 99.3 °F (37.4 °C)     Pulse (Heart Rate): 77     BP: 152/84     Resp Rate: 17     O2 Sat (%): 95 %    Lines & Drains:     Urinary Catheter? YES   Placement Date:    Medical Necessity: yes  Central Line? NO   Placement Date:    Medical Necessity:   PICC Line? NO   Placement Date:    Medical Necessity:     NG tube [] in [] removed [x] not applicable   Drains [x] in [] removed [] not applicable     Skin Integrity:      Wounds: no   Dressings Present: no    Wound Concerns: no      GI:    Current diet:  DIET CLEAR LIQUID    Nausea: YES  Vomiting: NO  Bowel Sounds: YES  Flatus: NO  Last Bowel Movement: yesterday   Appearance:     Respiratory:  Supplemental O2: NO      Device:    via  Liters/min     Incentive Spirometer: YES  Volume:   Coughing and Deep Breathing: YES  Oral Care: NO  Understanding (patient/family education): YES   Getting out of bed: YES  Head of bed elevation: YES    Patient Safety:    Falls Score: 3  Mobility Score: 1  Bed Alarm On? NO  Sitter? NO      Opportunity for questions and clarification was given to oncoming nurse. Patient bed is in low position, side rails are up x 2, door & observation blinds open as needed, call bell within reach and patient not in distress.     Jorja Sicard, RN

## 2017-02-03 NOTE — PROGRESS NOTES
End of Shift Nursing Note    Bedside shift change report given to [address] [place] [address] (oncoming nurse) by Oumou Roman (offgoing nurse). Report included the following information SBAR, Kardex, Intake/Output and MAR. Zone Phone:   [phone]    Significant changes during shift:    none   Non-emergent issues for physician to address:  nausea not controlled     Number times ambulated in hallway past shift: 1      Number of times OOB to chair past shift: 2    POD #:      Vital Signs:    Temp: 98.8 °F (37.1 °C)     Pulse (Heart Rate): 78     BP: (!) 160/95     Resp Rate: 16     O2 Sat (%): 98 %    Lines & Drains:     Urinary Catheter? YES       NG tube [] in [] removed [] not applicable   Drains [] in [] removed [] not applicable     Skin Integrity:      Wounds: yes   Dressings Present: yes    Wound Concerns: no      GI:    Current diet:  DIET CLEAR LIQUID    Nausea: YES  Vomiting: NO  Bowel Sounds: YES  Flatus: YES  Last Bowel Movement: today   Appearance:     Respiratory:  Supplemental O2: NO      Device:    via  Liters/min     Incentive Spirometer: YES  Volume: 1200  Coughing and Deep Breathing: YES  Oral Care: YES  Understanding (patient/family education): YES   Getting out of bed: YES  Head of bed elevation: YES    Patient Safety:    Falls Score: 3  Mobility Score: 1  Bed Alarm On? NO  Sitter? NO      Opportunity for questions and clarification was given to oncoming nurse. Patient bed is in low position, side rails are up x 2, door & observation blinds open as needed, call bell within reach and patient not in distress.     Teresita Pal RN

## 2017-02-03 NOTE — PROGRESS NOTES
End of Shift Nursing Note    Bedside shift change report given to Clau Shahid (oncoming nurse) by Ruby Vega (offgoing nurse). Report included the following information SBAR, Kardex, OR Summary, Procedure Summary, Intake/Output, MAR and Accordion. Significant changes during shift:    Pt amb in valerio and up to chair today. Jones draining clear yellow urine. PCA for pain. Nausea med x1 with good effect. Non-emergent issues for physician to address:   none     Number times ambulated in hallway past shift: 1      Number of times OOB to chair past shift: 1    POD #: 1     Vital Signs:    Temp: 98.4 °F (36.9 °C)     Pulse (Heart Rate): 78     BP: 121/76     Resp Rate: 17     O2 Sat (%): 97 %    Lines & Drains:     Urinary Catheter? YES   Placement VNCB:1-0-07   Medical Necessity: yes      NG tube [] in [] removed [x] not applicable   Drains [] in [] removed [x] not applicable     Skin Integrity:      Wounds: yes   Dressings Present: yes    Wound Concerns: no      GI:    Current diet:  DIET CLEAR LIQUID    Nausea: YES  Vomiting: NO  Bowel Sounds: YES  Flatus: NO  Last Bowel Movement: several days ago      Respiratory:  Supplemental O2: NO      Incentive Spirometer: YES  Volume: 1000  Coughing and Deep Breathing: YES  Oral Care: YES  Understanding (patient/family education): YES   Getting out of bed: YES  Head of bed elevation: YES    Patient Safety:    Falls Score: 2  Mobility Score: 1  Bed Alarm On? NO  Sitter? NO      Opportunity for questions and clarification was given to oncoming nurse. Patient bed is in low position, side rails are up x 2, door & observation blinds open as needed, call bell within reach and patient not in distress.     Solomon Brown RN

## 2017-02-03 NOTE — PROGRESS NOTES
End of Shift Nursing Note    Bedside shift change report given to Nelly (oncoming nurse) by Andra Kwon (offgoing nurse). Report included the following information SBAR, Kardex, ED Summary, OR Summary, Procedure Summary, Intake/Output and MAR. Zone Phone:       Significant changes during shift:    Passing flatus   Non-emergent issues for physician to address:   none     Number times ambulated in hallway past shift: 0      Number of times OOB to chair past shift: 0    POD #: 2     Vital Signs:    Temp: 99.2 °F (37.3 °C)     Pulse (Heart Rate): 83     BP: 114/74     Resp Rate: 18     O2 Sat (%): 92 %    Lines & Drains:     Urinary Catheter? YES   Placement Date: 2/1/17   Medical Necessity: yes  Central Line? NO   Placement Date: na   Medical Necessity: na  PICC Line? NO   Placement Date: na   Medical Necessity: na    NG tube [] in [] removed [x] not applicable   Drains [x] in [] removed [] not applicable     Skin Integrity:      Wounds: yes   Dressings Present: yes    Wound Concerns: no      GI:    Current diet:  DIET FULL LIQUID    Nausea: YES  Vomiting: NO  Bowel Sounds: YES  Flatus: YES  Last Bowel Movement: several days ago   Appearance:     Respiratory:  Supplemental O2: NO      Device: room air   via 0 Liters/min     Incentive Spirometer: YES  Volume: 1000  Coughing and Deep Breathing: YES  Oral Care: YES  Understanding (patient/family education): YES   Getting out of bed: YES  Head of bed elevation: YES    Patient Safety:    Falls Score: 1  Mobility Score: 5  Bed Alarm On? NO  Sitter? NO      Opportunity for questions and clarification was given to oncoming nurse. Patient bed is in low position, side rails are up x 3, door & observation blinds open as needed, call bell within reach and patient not in distress.     Ivis Walker RN

## 2017-02-03 NOTE — PROGRESS NOTES
Pt is a 60 y.o  [place] female admitted with Diverticulitis. Pt was alert, oriented, resting in bed with family at the bedside. Demographic information verified and all is correct. Pt lives with her  in a 1 story home with 1 step to the entrance. Prior to admission, pt was independent with her ADL's and IADL's and drives. Pt doesn't use any DME and has not had home health or rehab in the past. Preferred pharmacy is [facility]. Pt's  can transport her home at discharge. CM will continue to follow for discharge planning needs. Care Management Interventions  PCP Verified by CM: Yes Marylee Stark - [place] - new PCP)  Mode of Transport at Discharge:  Other (see comment) (family will transport)  Transition of Care Consult (CM Consult): Discharge Planning  Discharge Durable Medical Equipment: No  Physical Therapy Consult: No  Occupational Therapy Consult: No  Speech Therapy Consult: No  Current Support Network: Lives with Spouse, Own Home (lives with spouse in a 1 story home with a step)  Confirm Follow Up Transport: Family  Discharge [facility]  [facility] [address] [address]

## 2017-02-04 PROCEDURE — 74011250636 HC RX REV CODE- 250/636: Performed by: SURGERY

## 2017-02-04 PROCEDURE — 74011250637 HC RX REV CODE- 250/637: Performed by: SURGERY

## 2017-02-04 PROCEDURE — 65270000029 HC RM PRIVATE

## 2017-02-04 PROCEDURE — 74011250637 HC RX REV CODE- 250/637: Performed by: FAMILY MEDICINE

## 2017-02-04 RX ORDER — HYDROCODONE BITARTRATE AND ACETAMINOPHEN 5; 325 MG/1; MG/1
1-2 TABLET ORAL
Status: DISCONTINUED | OUTPATIENT
Start: 2017-02-04 | End: 2017-02-05 | Stop reason: HOSPADM

## 2017-02-04 RX ADMIN — CIPROFLOXACIN HYDROCHLORIDE 500 MG: 500 TABLET, FILM COATED ORAL at 17:15

## 2017-02-04 RX ADMIN — ENOXAPARIN SODIUM 40 MG: 40 INJECTION SUBCUTANEOUS at 12:40

## 2017-02-04 RX ADMIN — HYDROCODONE BITARTRATE AND ACETAMINOPHEN 1 TABLET: 5; 325 TABLET ORAL at 21:36

## 2017-02-04 RX ADMIN — Medication 10 ML: at 21:36

## 2017-02-04 RX ADMIN — Medication 10 ML: at 05:33

## 2017-02-04 RX ADMIN — POTASSIUM CHLORIDE 10 MEQ: 750 TABLET, FILM COATED, EXTENDED RELEASE ORAL at 08:22

## 2017-02-04 RX ADMIN — ALVIMOPAN 12 MG: 12 CAPSULE ORAL at 17:15

## 2017-02-04 RX ADMIN — HYDROCHLOROTHIAZIDE: 25 TABLET ORAL at 08:23

## 2017-02-04 RX ADMIN — CIPROFLOXACIN HYDROCHLORIDE 500 MG: 500 TABLET, FILM COATED ORAL at 08:21

## 2017-02-04 RX ADMIN — ALVIMOPAN 12 MG: 12 CAPSULE ORAL at 08:21

## 2017-02-04 RX ADMIN — DEXTROSE MONOHYDRATE, SODIUM CHLORIDE, AND POTASSIUM CHLORIDE 50 ML/HR: 50; 4.5; 1.49 INJECTION, SOLUTION INTRAVENOUS at 07:16

## 2017-02-04 RX ADMIN — METOCLOPRAMIDE 10 MG: 10 TABLET ORAL at 06:00

## 2017-02-04 RX ADMIN — METOCLOPRAMIDE 10 MG: 10 TABLET ORAL at 21:36

## 2017-02-04 RX ADMIN — HYDROCODONE BITARTRATE AND ACETAMINOPHEN 1 TABLET: 5; 325 TABLET ORAL at 18:18

## 2017-02-04 RX ADMIN — METOPROLOL SUCCINATE 50 MG: 50 TABLET, EXTENDED RELEASE ORAL at 08:21

## 2017-02-04 NOTE — PROGRESS NOTES
End of Shift Nursing Note    Bedside shift change report given to Sydney Carbone RN (oncoming nurse) by India Sanchez RN (offgoing nurse). Report included the following information SBAR, Kardex, MAR and Recent Results. Significant changes during shift:    none   Non-emergent issues for physician to address:   none     Number times ambulated in hallway past shift: 0      Number of times OOB to chair past shift: 2     Vital Signs:    Temp: 98.8 °F (37.1 °C)     Pulse (Heart Rate): 87     BP: (!) 140/96     Resp Rate: 16     O2 Sat (%): 94 %    Lines & Drains:     Urinary Catheter? YES   Medical Necessity: yes    NG tube [] in [] removed [x] not applicable   Drains [x] in [] removed [] not applicable     Skin Integrity:      Wounds: yes   Dressings Present: yes    Wound Concerns: no      GI:    Current diet:  DIET CLEAR LIQUID    Nausea: NO  Vomiting: NO  Bowel Sounds: YES  Flatus: NO  Last Bowel Movement: yesterday    Respiratory:  Supplemental O2: NO      Incentive Spirometer: YES   Coughing and Deep Breathing: YES  Oral Care: YES  Understanding (patient/family education): YES   Getting out of bed: YES  Head of bed elevation: YES    Patient Safety:    Falls Score: 1  Mobility Score: 1  Bed Alarm On? NO  Sitter? NO      Opportunity for questions and clarification was given to oncoming nurse. Patient bed is in low position, side rails are up x 2, door & observation blinds open as needed, call bell within reach and patient not in distress.     Melany Cooks, RN

## 2017-02-04 NOTE — PROGRESS NOTES
End of Shift Nursing Note    Bedside shift change report given to Sang Broussard RN (oncoming nurse) by Ralph Marx RN (offgoing nurse). Report included the following information SBAR, Kardex, OR Summary, Intake/Output, MAR and Recent Results. [facility] Phone:   [phone]    Significant changes during shift:    Oral pain meds ordered,  PCA for breakthrough. IV re-sited since it \"fell out\". Non-emergent issues for physician to address:  Diastolic BP creeping up. Pt is on home meds already     Number times ambulated in hallway past shift: 2      Number of times OOB to chair past shift: 3    POD #: 3     Vital Signs:    Temp: 99.2 °F (37.3 °C)     Pulse (Heart Rate): 81     BP: (!) 141/91     Resp Rate: 18     O2 Sat (%): 94 %    Lines & Drains:     Urinary Catheter? YES   Placement Date: 2/1/17   Medical Necessity: yes      NG tube [] in [] removed [] not applicable   Drains [x] in [] removed [] not applicable     Skin Integrity:      Wounds: yes   Dressings Present: yes    Wound Concerns: no      GI:    Current diet:  DIET GI LITE (POST SURGICAL)    Nausea: NO  Vomiting: NO  Bowel Sounds: YES  Flatus: YES  Last Bowel Movement: today   Appearance: soft green    Respiratory:  Supplemental O2: NO           Incentive Spirometer: yes  Volume: 1000  Coughing and Deep Breathing: YES  Oral Care: YES  Understanding (patient/family education): YES   Getting out of bed: YES  Head of bed elevation: YES    Patient Safety:    Falls Score: 2  Mobility Score: 1  Bed Alarm On? NO  Sitter? NO      Opportunity for questions and clarification was given to oncoming nurse. Patient bed is in low position, side rails are up x 2, door & observation blinds open as needed, call bell within reach and patient not in distress.     Jagdish Mancini RN

## 2017-02-04 NOTE — PROGRESS NOTES
Admit Date: 2017    POD 3 Days Post-Op    Procedure:  Procedure(s):  LAPAROSCOPIC SIGMOID COLECTOMY, CYSTOSCOPY BILATERAL URETERAL CATHETER PLACEMENT  CYSTOSCOPY INSERTION URETERAL CATHETERS    Subjective:     Patient has no new complaints. Flatus and BM    Objective:     Blood pressure (!) 153/91, pulse 73, temperature 98.4 °F (36.9 °C), resp. rate 18, height 5' 4\" (1.626 m), weight 167 lb 12.3 oz (76.1 kg), last menstrual period 2005, SpO2 99 %. Temp (24hrs), Av.8 °F (37.1 °C), Min:98.4 °F (36.9 °C), Max:99.2 °F (37.3 °C)      Physical Exam:  GENERAL: alert, cooperative, no distress, appears stated age, LUNG: nl efforty, HEART: regular rate and rhythm, ABDOMEN: +BS, EXTREMITIES:  extremities normal, atraumatic, no cyanosis or edema    Labs: No results found for this or any previous visit (from the past 24 hour(s)). Data Review reviewed  Nursing documentation and I & O    Assessment:     Active Problems:    Diverticulitis (2017)      Colovesical fistula (2017)        Plan/Recommendations/Medical Decision Making:     Continue present treatment  Diet  gi lite  if rolando diet then D/C tomorrow with drain and hyman   Po pain med    Isa Has.  Roxy Carlson MD, [facility]

## 2017-02-05 VITALS
WEIGHT: 167.77 LBS | DIASTOLIC BLOOD PRESSURE: 80 MMHG | HEART RATE: 71 BPM | TEMPERATURE: 98.8 F | HEIGHT: 64 IN | BODY MASS INDEX: 28.64 KG/M2 | RESPIRATION RATE: 16 BRPM | SYSTOLIC BLOOD PRESSURE: 126 MMHG | OXYGEN SATURATION: 98 %

## 2017-02-05 PROCEDURE — 74011250637 HC RX REV CODE- 250/637: Performed by: SURGERY

## 2017-02-05 PROCEDURE — 74011250637 HC RX REV CODE- 250/637: Performed by: FAMILY MEDICINE

## 2017-02-05 PROCEDURE — 77030010545

## 2017-02-05 PROCEDURE — 74011250636 HC RX REV CODE- 250/636: Performed by: SURGERY

## 2017-02-05 RX ORDER — HYDROCODONE BITARTRATE AND ACETAMINOPHEN 5; 325 MG/1; MG/1
1-2 TABLET ORAL
Qty: 30 TAB | Refills: 0 | Status: SHIPPED | OUTPATIENT
Start: 2017-02-05 | End: 2017-02-28 | Stop reason: ALTCHOICE

## 2017-02-05 RX ORDER — CIPROFLOXACIN 500 MG/1
500 TABLET ORAL 2 TIMES DAILY
Qty: 10 TAB | Refills: 0 | Status: SHIPPED | OUTPATIENT
Start: 2017-02-05 | End: 2017-02-10

## 2017-02-05 RX ADMIN — DEXTROSE MONOHYDRATE, SODIUM CHLORIDE, AND POTASSIUM CHLORIDE 50 ML/HR: 50; 4.5; 1.49 INJECTION, SOLUTION INTRAVENOUS at 02:49

## 2017-02-05 RX ADMIN — METOCLOPRAMIDE 10 MG: 10 TABLET ORAL at 05:43

## 2017-02-05 RX ADMIN — HYDROCHLOROTHIAZIDE: 25 TABLET ORAL at 07:36

## 2017-02-05 RX ADMIN — METOPROLOL SUCCINATE 50 MG: 50 TABLET, EXTENDED RELEASE ORAL at 07:35

## 2017-02-05 RX ADMIN — HYDROCODONE BITARTRATE AND ACETAMINOPHEN 1 TABLET: 5; 325 TABLET ORAL at 02:55

## 2017-02-05 RX ADMIN — HYDROCODONE BITARTRATE AND ACETAMINOPHEN 2 TABLET: 5; 325 TABLET ORAL at 12:02

## 2017-02-05 RX ADMIN — Medication 10 ML: at 05:44

## 2017-02-05 RX ADMIN — POTASSIUM CHLORIDE 10 MEQ: 750 TABLET, FILM COATED, EXTENDED RELEASE ORAL at 07:37

## 2017-02-05 RX ADMIN — ALVIMOPAN 12 MG: 12 CAPSULE ORAL at 07:36

## 2017-02-05 RX ADMIN — CIPROFLOXACIN HYDROCHLORIDE 500 MG: 500 TABLET, FILM COATED ORAL at 07:37

## 2017-02-05 RX ADMIN — HYDROCODONE BITARTRATE AND ACETAMINOPHEN 2 TABLET: 5; 325 TABLET ORAL at 07:33

## 2017-02-05 NOTE — PROGRESS NOTES
Instructed patient in use of leg bag for hyman catheter,  Pt verbalized understanding and demonstrated she knew what to do,  Pt has had a leg bag in the past.  J.P. drain dressing changed,  Demonstrated to patient how to change J.P. dressing and strip J.P. drain,   J.P. drainage record given to patient with instructions to empty drain 2 times a day. J.P. instructions also reviewed with patient and patient given written instructions.

## 2017-02-05 NOTE — DISCHARGE INSTRUCTIONS
DISCHARGE SUMMARY from Nurse    The following personal items are in your possession at time of discharge:    Dental Appliances: None  Visual Aid: None  Home Medications: None  Jewelry: None  Clothing: None (left in in pt. room)  Other Valuables: None             PATIENT INSTRUCTIONS:    While taking prescription Narcotics:  · Limit your activities  · Do not drive and operate hazardous machinery  · Do not make important personal or business decisions  · Do  not drink alcoholic beverages    Empty Jones bag periodically throughout the day,  Use larger bedside drainage bag at night,  Change to leg bag during the day so you can be more active. Empty JAH drain at least 2 times a day and record output. Clean around catheter where it exits your body every day. Clean any crustiness off catheter tube at that time using soap and water, rinse well. Report the following to your surgeon:  · Excessive pain, swelling, redness or odor of or around the surgical area  · Temperature over 100.5  · Nausea and vomiting lasting longer than 4 hours or if unable to take medications  · Any signs of decreased circulation or nerve impairment to extremity: change in color, persistent  numbness, tingling, coldness or increase pain  · Any questions      To prevent infection remember to use good handwashing. Your surgeons phone number is [phone]. What to do at Home:  Recommended activity: Activity as tolerated. Recommended diet: Low fiber diet    If you have surgical incisions you may shower. Please do not scrub incision, let water flow over area and pat dry. Do not tub bathe, get in a hot tub or swim; until your wounds have healed and given the OK by your surgeon to do so. *  Please give a list of your current medications to your Primary Care Provider. *  Please update this list whenever your medications are discontinued, doses are      changed, or new medications (including over-the-counter products) are added.     *  Please carry medication information at all times in case of emergency situations. These are general instructions for a healthy lifestyle:    No smoking/ No tobacco products/ Avoid exposure to second hand smoke    Surgeon General's Warning:  Quitting smoking now greatly reduces serious risk to your health. Obesity, smoking, and sedentary lifestyle greatly increases your risk for illness    A healthy diet, regular physical exercise & weight monitoring are important for maintaining a healthy lifestyle    You may be retaining fluid if you have a history of heart failure or if you experience any of the following symptoms:  Weight gain of 3 pounds or more overnight or 5 pounds in a week, increased swelling in our hands or feet or shortness of breath while lying flat in bed. Please call your doctor as soon as you notice any of these symptoms; do not wait until your next office visit. Recognize signs and symptoms of STROKE:    F-face looks uneven    A-arms unable to move or move unevenly    S-speech slurred or non-existent    T-time-call 911 as soon as signs and symptoms begin-DO NOT go       Back to bed or wait to see if you get better-TIME IS BRAIN. The discharge information has been reviewed with the patient. The patient verbalized understanding. Diverticulitis: Care Instructions  Your Care Instructions    Diverticulitis occurs when pouches form in the wall of the colon and become inflamed or infected. It can be very painful. Doctors aren't sure what causes diverticulitis. There is no proof that foods such as nuts, seeds, or berries cause it or make it worse. A low-fiber diet may cause the colon to work harder to push stool forward. Pouches may form because of this extra work. It may be hard to think about healthy eating while you're in pain. But as you recover, you might think about how you can use healthy eating for overall better health.  Healthy eating may help you avoid future attacks. Follow-up care is a key part of your treatment and safety. Be sure to make and go to all appointments, and call your doctor if you are having problems. It's also a good idea to know your test results and keep a list of the medicines you take. How can you care for yourself at home? · Drink plenty of fluids, enough so that your urine is light yellow or clear like water. If you have kidney, heart, or liver disease and have to limit fluids, talk with your doctor before you increase the amount of fluids you drink. · Stick to liquids or a bland diet (plain rice, bananas, dry toast or crackers, applesauce) until you are feeling better. Then you can return to regular foods and gradually increase the amount of fiber in your diet. · Use a heating pad set on low on your belly to relieve mild cramps and pain. · Get extra rest until you are feeling better. · Be safe with medicines. Read and follow all instructions on the label. ¨ If the doctor gave you a prescription medicine for pain, take it as prescribed. ¨ If you are not taking a prescription pain medicine, ask your doctor if you can take an over-the-counter medicine. · If your doctor prescribed antibiotics, take them as directed. Do not stop taking them just because you feel better. You need to take the full course of antibiotics. To prevent future attacks of diverticulitis  · Avoid constipation:  ¨ Include fruits, vegetables, beans, and whole grains in your diet each day. These foods are high in fiber. ¨ Drink plenty of fluids, enough so that your urine is light yellow or clear like water. If you have kidney, heart, or liver disease and have to limit fluids, talk with your doctor before you increase the amount of fluids you drink. ¨ Get some exercise every day. Build up slowly to 30 to 60 minutes a day on 5 or more days of the week. ¨ Take a fiber supplement, such as Citrucel or Metamucil, every day if needed.  Read and follow all instructions on the label.  ¨ Schedule time each day for a bowel movement. Having a daily routine may help. Take your time and do not strain when having a bowel movement. When should you call for help? Call 911 anytime you think you may need emergency care. For example, call if:  · You passed out (lost consciousness). · You vomit blood or what looks like coffee grounds. · You pass maroon or very bloody stools. Call your doctor now or seek immediate medical care if:  · You have severe pain or swelling in your belly. · You have a new or higher fever. · You cannot keep down fluids or medicines. · You have new pain that gets worse when you move or cough. Watch closely for changes in your health, and be sure to contact your doctor if:  · The symptoms you had when you first started feeling sick come back. · You do not get better as expected. Where can you learn more? Go to http://jean carlos-rufus.info/. Enter H901 in the search box to learn more about \"Diverticulitis: Care Instructions. \"  Current as of: August 9, 2016  Content Version: 11.1  © 6946-1131 [employer]. Care instructions adapted under license by [employer] (which disclaims liability or warranty for this information). If you have questions about a medical condition or this instruction, always ask your healthcare professional. [address] any warranty or liability for your use of this information.

## 2017-02-05 NOTE — PROGRESS NOTES
D/C instructions reviewed with patient along with medication information. Pt aware to not take another narcotic pain pill until after 4 pm today.

## 2017-02-05 NOTE — PROGRESS NOTES
End of Shift Nursing Note    Bedside shift change report given to [address] [place] [address] (oncoming nurse) by Serina Pepper (offgoing nurse). Report included the following information SBAR, Kardex, Intake/Output and MAR. Zone Phone:   [phone]    Significant changes during shift:    none   Non-emergent issues for physician to address:   none     Number times ambulated in hallway past shift:       Number of times OOB to chair past shift:     POD #: 4     Vital Signs:    Temp: 98.1 °F (36.7 °C)     Pulse (Heart Rate): 74     BP: (!) 148/114     Resp Rate: 18     O2 Sat (%): 96 %    Lines & Drains:     Urinary Catheter? YES   Placement Date:    Medical Necessity: yes  Central Line? NO   Placement Date:    Medical Necessity:   PICC Line? NO   Placement Date:    Medical Necessity:     NG tube [] in [] removed [] not applicable   Drains [x] in       JAH drain [] removed [] not applicable     Skin Integrity:      Wounds: yes   Dressings Present: yes    Wound Concerns: no      GI:    Current diet:  DIET GI LITE (POST SURGICAL)    Nausea: NO  Vomiting: NO  Bowel Sounds: YES  Flatus: YES  Last Bowel Movement: yesterday   Appearance:     Respiratory:  Supplemental O2: NO      Device:    via  Liters/min     Incentive Spirometer: YES  Volume: 1,000  Coughing and Deep Breathing: YES  Oral Care: YES  Understanding (patient/family education): YES   Getting out of bed: YES  Head of bed elevation: YES    Patient Safety:    Falls Score: 1  Mobility Score:   Bed Alarm On? NO  Sitter? NO      Opportunity for questions and clarification was given to oncoming nurse. Patient bed is in low position, side rails are up x 2, door & observation blinds open as needed, call bell within reach and patient not in distress.     Maria Teresa Glass RN

## 2017-02-05 NOTE — DISCHARGE SUMMARY
Physician Discharge Summary     Patient ID:  Alison Trammell  [id number]  89 y.o.  1960    Admit Date: 2/1/2017    Discharge Date: 2/5/2017    Admission Diagnoses: DIVERTICULITIS; Diverticulitis    Discharge Diagnoses: Active Problems:    Diverticulitis (2/1/2017)      Colovesical fistula (2/1/2017)         Admission Condition: Fair    Discharge Condition: Good    Last Procedure: Procedure(s):  LAPAROSCOPIC SIGMOID COLECTOMY, CYSTOSCOPY BILATERAL URETERAL CATHETER PLACEMENT  [address] [place] [address] Course:   Normal hospital course for this procedure. Consults: None    Disposition: home    Patient Instructions:   Current Discharge Medication List      START taking these medications    Details   HYDROcodone-acetaminophen (NORCO) 5-325 mg per tablet Take 1-2 Tabs by mouth every four (4) hours as needed. Max Daily Amount: 12 Tabs. Qty: 30 Tab, Refills: 0         CONTINUE these medications which have CHANGED    Details   ciprofloxacin HCl (CIPRO) 500 mg tablet Take 1 Tab by mouth two (2) times a day for 5 days.   Qty: 10 Tab, Refills: 0         CONTINUE these medications which have NOT CHANGED    Details   lisinopril-hydroCHLOROthiazide (PRINZIDE, ZESTORETIC) 20-25 mg per tablet TAKE ONE TABLET BY MOUTH EVERY DAY  Qty: 90 Tab, Refills: 1    Associated Diagnoses: Essential hypertension with goal blood pressure less than 140/90      metoprolol succinate (TOPROL-XL) 50 mg XL tablet TAKE ONE TABLET BY MOUTH EVERY DAY  Qty: 90 Tab, Refills: 1    Associated Diagnoses: Essential hypertension with goal blood pressure less than 140/90      potassium chloride (KLOR-CON M10) 10 mEq tablet TAKE ONE TABLET BY MOUTH EVERY DAY  Qty: 90 Tab, Refills: 1    Associated Diagnoses: Essential hypertension with goal blood pressure less than 140/90      ALPRAZolam (XANAX) 0.5 mg tablet TAKE ONE-HALF TO ONE TABLET BY MOUTH EVERY 8 HOURS AS NEEDED FOR ANXIETY, AGGRESSION, AND SLEEP **SHOULD LAST AT LEAST 30 DAYS**  Qty: 60 Tab, Refills: 1    Associated Diagnoses: Anxiety disorder, unspecified type      cetirizine (ZYRTEC) 10 mg tablet Take 10 mg by mouth daily as needed. Associated Diagnoses: Environmental and seasonal allergies           Activity: No lifting,  or Strenuous exercise for 4 weeks. No driving until after seen in office  Diet: Low fat, Low cholesterol  Wound Care: As directed, yenni and JAH    Follow-up with Dr Kerry Ventura this week.     Signed:  Karen Jara MD  [facility]  2/5/2017  11:19 AM

## 2017-02-05 NOTE — PROGRESS NOTES
Spiritual Care Assessment/Progress Notes    Efren Cramer [id number]  [id number]    1960  60 y.o.  female    Patient Telephone Number: [phone] (home)   Orthodox Affiliation: Renu Pruett   Language: English   Extended Emergency Contact Information  Primary Emergency Contact: Catrina Pepper, [address] [facility] Phone: [phone]  Mobile Phone: [phone]  Relation: Spouse   Patient Active Problem List    Diagnosis Date Noted    Diverticulitis 02/01/2017    Colovesical fistula 02/01/2017    Diverticulitis of intestine with abscess 09/13/2016    Hypovitaminosis D 04/27/2015    Microscopic hematuria 04/27/2015    Sigmoid diverticulitis 02/14/2014    Diverticulosis of large intestine with hemorrhage 02/14/2014     Class: Chronic    Anxiety disorder 02/14/2014     Class: Chronic    Dehydration 02/14/2014    Hyperlipidemia with target LDL less than 100 05/13/2013     Class: Chronic    Hypertension, goal below 140/90 10/16/2012     Class: Chronic        Date: 2/5/2017       Level of Orthodox/Spiritual Activity:  [x]         Involved in katerine tradition/spiritual practice    []         Not involved in katerine tradition/spiritual practice  [x]         Spiritually oriented    []         Claims no spiritual orientation    []         seeking spiritual identity  []         Feels alienated from Holiness practice/tradition  []         Feels angry about Holiness practice/tradition  [x]         Spirituality/Holiness tradition is a resource for coping at this time.   []         Not able to assess due to medical condition    Services Provided Today:  []         crisis intervention    []         reading Scriptures  [x]         spiritual assessment    []         prayer  [x]         empathic listening/emotional support  []         rites and rituals (cite in comments)  []         life review     []         Holiness support  []         theological development   [] advocacy  []         ethical dialog     []         blessing  []         bereavement support    []         support to family  []         anticipatory grief support   []         help with AMD  []         spiritual guidance    []         meditation      Spiritual Care Needs  []         Emotional Support  []         Spiritual/Latter-day Care  []         Loss/Adjustment  []         Advocacy/Referral                /Ethics  [x]         No needs expressed at               this time  []         Other: (note in               comments)  [address]  []         Follow up visits with               pt/family  []         Provide materials  []         Schedule sacraments  []         Contact Community               Clergy  [x]         Follow up as needed  []         Other: (note in               comments)     Comments: On rounds.  provided empathic listening as pt shared of her experience here while she was admitted. Pt expressed her ericka of being able to go back home. Fam seemed to be supportive of pt and she shared about her relationship with them. Pt is a member of [address] [place] [address] and Bernie Calvin has been coming by for spiritual support. Pt is aware of  availability. Meli Tijerina M.S.   Spiritual Care Department  If need arise please call Breanna-VERA ([phone])

## 2017-02-07 NOTE — OP NOTES
[address], [address]   OP NOTE       Name:  Belem Silverman   MR#:  [id number]   :  1960   Account #:  [de-identified]    Surgery Date:  2017   Date of Adm:  2017       PREOPERATIVE DIAGNOSIS: Sigmoid diverticulitis with colovesical   fistula. POSTOPERATIVE DIAGNOSIS: Sigmoid diverticulitis with colovesical   fistula. PROCEDURES PERFORMED: Laparoscopic takedown of colovesical   fistula, laparoscopic takedown of the splenic flexure, laparoscopic   sigmoid colectomy with coloproctostomy. Cystoscopy will be dictated   under a separate heading by Dr. Kassidy Larson. ANESTHESIA: General endotracheal.    ESTIMATED BLOOD LOSS: 150 mL. SPECIMENS REMOVED: Sigmoid colon and proximal and distal   donuts from the colorectal anastomosis. INDICATIONS: The patient is a 64-year-old female who has previously   been hospitalized with severe diverticulitis. Clinically, she had   developed a colovesical fistula. This was evident on imaging as well. FINDINGS: There was dense sigmoid disease with attachment to the   left aspect of the bladder. DESCRIPTION OF PROCEDURE: After obtaining informed consent,   the patient was taken to the operating room, placed supine on the   operating table. An operative time-out was performed and general   endotracheal anesthesia was induced. Preoperative antibiotics were   administered. Perineum was prepped and draped in the usual sterile   fashion and Dr. Kassidy Larson performed cystoscopy with bilateral ureteral stent   placement and placement of Jones catheter. This will be dictated under   a separate heading. After he was finished with his portion, the drapes   were taken down and rectal irrigation was done by me. The abdomen   and perineum were then prepped and draped in the usual sterile   fashion. The abdomen was entered through an 8 cm periumbilical   incision and the GelPort was inserted.  The abdomen was inflated   without incident and was visually explored. The dense left lower   quadrant inflammatory process was noted. A left mid abdominal 5 mm   port was placed under direct vision, followed by a 12 mm port in the   right lower quadrant and 2 more 5-mm ports in the left and right upper   quadrants. Using these and the hand port, the adhesions between the   sigmoid colon and the anterior abdominal wall and pelvic side wall   were taken down bluntly and with the LigaSure device. The ureteral   stent on the left side was palpated and was avoided. The attachments   to the bladder were divided. An obvious inflammatory communication,   but no overt leakage of urine and the bladder catheter was not visible. The rectum was then divided using the IRMA stapler at the upper third of   the rectum. The mesentery was then divided proximally, taking care to   avoid the ureters by palpating the stents. The vascular pedicle was   divided with a gray load of the IRMA stapler. Working further proximally,   the descending colon was medially rotated taking down the white line   of Toldt with the LigaSure device. Working further proximally, the   splenic flexure was taken down allowing the healthy portion of the   descending colon to reach the pelvis. The bladder was then instilled   with saline and methylene blue to test for open communication at the   prior fistula site. There was no gross leakage. The Jones was placed   back to gravity. The Jones will be in a minimum of 1 week. The   diseased sigmoid colon was then externalized and the descending   colon was divided with scissors proximal to the diseased segment. The   specimen was passed off. The EEA stapler was selected after sizing   and the anvil was placed in the descending colon using the pursestring   of 2-0 Prolene suture. Gloves were then changed and the anvil and   colon were dropped back into the abdominal cavity. Dr. Constantin Jefferson   worked the [employer] from below.  The spike was brought up 2 cm   anterior to the prior staple line. The anvil was attached and after   palpating to avoid entrapment of surrounding tissues, the anastomosis   was created. The pelvis was filled with saline and air was insufflated   into the rectum with an Asepto and there was no evidence of leak. The   pelvis was then suction irrigated until clear. The abdomen was   inspected for hemostasis and excellent hemostasis was noted. The   abdomen was then desufflated, and the ports and GelPort were   removed. The 12-mm fascial defect in the right lower quadrant was   closed with 0 Vicryl suture. The fascia at the midline incision was   closed with #1 looped PDS sutures from above and below. The   subcutaneous tissues were irrigated with bacitracin and saline, and the   skin was closed in 2 layers with interrupted 3-0 Vicryl sutures and then   a subcuticular 4-0 Monocryl. The port sites were likewise closed and all   were dressed with Dermabond. Janelle Jerman had been left in the pelvis   adjacent to the prior colovesical fistula and was brought out through   the left lower quadrant incision prior to closure and sutured to the skin   with a nylon suture. The patient was recovered from general   anesthesia and taken to the recovery area in satisfactory condition. All   instrument, sponge, and needle counts were reported as correct.         Vin Briggs MD DD / Kamille Elliott   D:  02/07/2017   10:29   T:  02/07/2017   11:54   Job #:  [id number]

## 2017-02-09 ENCOUNTER — OFFICE VISIT (OUTPATIENT)
Dept: SURGERY | Age: 57
End: 2017-02-09

## 2017-02-09 DIAGNOSIS — Z09 POSTOPERATIVE EXAMINATION: Primary | ICD-10-CM

## 2017-02-09 NOTE — PROGRESS NOTES
To: Josselyn Worley PA-C, Omar Lin MD  From: Jacobo Whyte MD    Thank you for referring Darvin Beckford. She is doing very well. Encounter Date: 2/9/2017    Subjective:      Allyn Severs is a 60 y.o. female presents for postop care 1 week(s) following sigmoid colectomy and repair of colovesical fistula. Has no complaints today except the hyman. Eating a regular diet without difficulty. Bowel movements are regular. Objective:     General:  alert, cooperative, no distress, appears stated age   Abdomen: soft, bowel sounds active, non-tender   Incision(s):   healing well, no drainage, no erythema, no hernia, no seroma, no swelling, no dehiscence, incision well approximated. Assessment:     Diverticulitis, colovesical fistula. Status post above. Doing well postoperatively. Path - no malignancy. Plan: Hyman removed. Will keep JAH in place over the weekend and then remove next week as long is there is no evidence of urinary leak.       Jacobo Whyte MD

## 2017-02-09 NOTE — MR AVS SNAPSHOT
Visit Information Date & Time Provider Department Dept. Phone Encounter #  
 2/9/2017 11:00 AM Ricci Glover MD [facility] [id number] Your Appointments 2/14/2017  2:20 PM  
POST OP with Ricci Glover MD  
[facility] Dar[facility] ([facility]) Appt Note: po- sigmoid colectomy (hernan drain )  
 [address], [address] [facility], [address] [phone]  
[address], [address] [facility], [address] [facility] [address] [address] Upcoming Health Maintenance Date Due  
 BREAST CANCER SCRN MAMMOGRAM 12/3/2017 DTaP/Tdap/Td series (2 - Td) 6/1/2025 COLONOSCOPY 11/9/2026 Allergies as of 2/9/2017  Review Complete On: 2/9/2017 By: Ricci Glover MD  
  
 Severity Noted Reaction Type Reactions Cephalexin  01/16/2017    Hives Current Immunizations  Reviewed on 4/26/2015 Name Date Influenza Vaccine 9/1/2016 Influenza Vaccine (Quad) 10/8/2015 11:59 AM  
 Influenza Vaccine Intradermal PF 2/20/2014  9:13 AM  
 Tdap 6/1/2015 Not reviewed this visit You Were Diagnosed With   
  
 Codes Comments Postoperative examination    -  Primary ICD-10-CM: Y92 ICD-9-CM: V67.00 Vitals LMP OB Status Smoking Status 02/12/2005 Hysterectomy Never Smoker Preferred Pharmacy Pharmacy Name Phone [facility] [address] [facility], [address] [place] [address] [phone] Your Updated Medication List  
  
   
This list is accurate as of: 2/9/17 12:55 PM.  Always use your most recent med list.  
  
  
  
  
 ALPRAZolam 0.5 mg tablet Commonly known as:  XANAX  
TAKE ONE-HALF TO ONE TABLET BY MOUTH EVERY 8 HOURS AS NEEDED FOR ANXIETY, AGGRESSION, AND SLEEP **SHOULD LAST AT LEAST 30 DAYS**  
  
 ciprofloxacin HCl 500 mg tablet Commonly known as:  CIPRO Take 1 Tab by mouth two (2) times a day for 5 days. HYDROcodone-acetaminophen 5-325 mg per tablet Commonly known as:  Viva Nogueira Take 1-2 Tabs by mouth every four (4) hours as needed. Max Daily Amount: 12 Tabs. lisinopril-hydroCHLOROthiazide 20-25 mg per tablet Commonly known as:  PRINZIDE, ZESTORETIC  
TAKE ONE TABLET BY MOUTH EVERY DAY  
  
 metoprolol succinate 50 mg XL tablet Commonly known as:  TOPROL-XL  
TAKE ONE TABLET BY MOUTH EVERY DAY  
  
 potassium chloride 10 mEq tablet Commonly known as:  KLOR-CON M10  
TAKE ONE TABLET BY MOUTH EVERY DAY ZyrTEC 10 mg tablet Generic drug:  cetirizine Take 10 mg by mouth daily as needed. Introducing [facility] & HEALTH SERVICES! Josette Bui introduces aCon patient portal. Now you can access parts of your medical record, email your doctor's office, and request medication refills online. 1. In your internet browser, go to [email or web address] 2. Click on the First Time User? Click Here link in the Sign In box. You will see the New Member Sign Up page. 3. Enter your aCon Access Code exactly as it appears below. You will not need to use this code after youve completed the sign-up process. If you do not sign up before the expiration date, you must request a new code. · aCon Access Code: [email or web address] Expires: 2/14/2017  5:01 PM 
 
4. Enter the last four digits of your Social Security Number (xxxx) and Date of Birth (mm/dd/yyyy) as indicated and click Submit. You will be taken to the next sign-up page. 5. Create a Tunaspott ID. This will be your aCon login ID and cannot be changed, so think of one that is secure and easy to remember. 6. Create a aCon password. You can change your password at any time. 7. Enter your Password Reset Question and Answer. This can be used at a later time if you forget your password. 8. Enter your e-mail address. You will receive e-mail notification when new information is available in [address] 9. Click Sign Up. You can now view and download portions of your medical record. 10. Click the Download Summary menu link to download a portable copy of your medical information. If you have questions, please visit the Frequently Asked Questions section of the Bloompop website. Remember, Bloompop is NOT to be used for urgent needs. For medical emergencies, dial 911. Now available from your iPhone and Android! Please provide this summary of care documentation to your next provider. Your primary care clinician is listed as Waleska Lynch. If you have any questions after today's visit, please call [phone].

## 2017-02-14 ENCOUNTER — OFFICE VISIT (OUTPATIENT)
Dept: SURGERY | Age: 57
End: 2017-02-14

## 2017-02-14 VITALS
HEART RATE: 79 BPM | WEIGHT: 167 LBS | OXYGEN SATURATION: 100 % | DIASTOLIC BLOOD PRESSURE: 83 MMHG | BODY MASS INDEX: 28.51 KG/M2 | RESPIRATION RATE: 20 BRPM | HEIGHT: 64 IN | SYSTOLIC BLOOD PRESSURE: 162 MMHG

## 2017-02-14 DIAGNOSIS — Z09 POSTOPERATIVE EXAMINATION: Primary | ICD-10-CM

## 2017-02-14 NOTE — MR AVS SNAPSHOT
Visit Information Date & Time Provider Department Dept. Phone Encounter #  
 2/14/2017  2:20 PM Gianni Quinn MD [facility] [address] [phone] [id number] Your Appointments 2/28/2017  1:40 PM  
POST OP with Gianni Quinn MD  
[facility] [address] [facility] ([facility]) Appt Note: 2 week. post op sigmoid colectomy 2/1  
 [address], [address] [facility], [address] [phone]  
[address], [address] [facility], [address] [facility] [address] [phone] Upcoming Health Maintenance Date Due  
 BREAST CANCER SCRN MAMMOGRAM 12/3/2017 DTaP/Tdap/Td series (2 - Td) 6/1/2025 COLONOSCOPY 11/9/2026 Allergies as of 2/14/2017  Review Complete On: 2/9/2017 By: Gianni Quinn MD  
  
 Severity Noted Reaction Type Reactions Cephalexin  01/16/2017    Hives Current Immunizations  Reviewed on 4/26/2015 Name Date Influenza Vaccine 9/1/2016 Influenza Vaccine (Quad) 10/8/2015 11:59 AM  
 Influenza Vaccine Intradermal PF 2/20/2014  9:13 AM  
 Tdap 6/1/2015 Not reviewed this visit Vitals BP Pulse Resp Height(growth percentile) Weight(growth percentile) LMP  
 162/83 (BP 1 Location: Right arm, BP Patient Position: Sitting) 79 20 5' 4\" (1.626 m) 167 lb (75.8 kg) 02/12/2005 SpO2 BMI OB Status Smoking Status 100% 28.67 kg/m2 Hysterectomy Never Smoker BMI and BSA Data Body Mass Index Body Surface Area  
 28.67 kg/m 2 1.85 m 2 Preferred Pharmacy Pharmacy Name Phone Alyssia Briggs [address] , [address] [place] [address] [phone] Your Updated Medication List  
  
   
This list is accurate as of: 2/14/17  2:58 PM.  Always use your most recent med list.  
  
  
  
  
 ALPRAZolam 0.5 mg tablet Commonly known as:  XANAX  
TAKE ONE-HALF TO ONE TABLET BY MOUTH EVERY 8 HOURS AS NEEDED FOR ANXIETY, AGGRESSION, AND SLEEP **SHOULD LAST AT LEAST 30 DAYS** HYDROcodone-acetaminophen 5-325 mg per tablet Commonly known as:  Hunter Ill Take 1-2 Tabs by mouth every four (4) hours as needed. Max Daily Amount: 12 Tabs. lisinopril-hydroCHLOROthiazide 20-25 mg per tablet Commonly known as:  PRINZIDE, ZESTORETIC  
TAKE ONE TABLET BY MOUTH EVERY DAY  
  
 metoprolol succinate 50 mg XL tablet Commonly known as:  TOPROL-XL  
TAKE ONE TABLET BY MOUTH EVERY DAY  
  
 potassium chloride 10 mEq tablet Commonly known as:  KLOR-CON M10  
TAKE ONE TABLET BY MOUTH EVERY DAY ZyrTEC 10 mg tablet Generic drug:  cetirizine Take 10 mg by mouth daily as needed. Introducing [facility] & HEALTH SERVICES! Kellie Leija introduces [employer] patient portal. Now you can access parts of your medical record, email your doctor's office, and request medication refills online. 1. In your internet browser, go to [email or web address] 2. Click on the First Time User? Click Here link in the Sign In box. You will see the New Member Sign Up page. 3. Enter your [employer] Access Code exactly as it appears below. You will not need to use this code after youve completed the sign-up process. If you do not sign up before the expiration date, you must request a new code. · [employer] Access Code: [email or web address] Expires: 2/14/2017  5:01 PM 
 
4. Enter the last four digits of your Social Security Number (xxxx) and Date of Birth (mm/dd/yyyy) as indicated and click Submit. You will be taken to the next sign-up page. 5. Create a Revolightst ID. This will be your [employer] login ID and cannot be changed, so think of one that is secure and easy to remember. 6. Create a [employer] password. You can change your password at any time. 7. Enter your Password Reset Question and Answer. This can be used at a later time if you forget your password. 8. Enter your e-mail address.  You will receive e-mail notification when new information is available in YapStone. 9. Click Sign Up. You can now view and download portions of your medical record. 10. Click the Download Summary menu link to download a portable copy of your medical information. If you have questions, please visit the Frequently Asked Questions section of the YapStone website. Remember, YapStone is NOT to be used for urgent needs. For medical emergencies, dial 911. Now available from your iPhone and Android! Please provide this summary of care documentation to your next provider. Your primary care clinician is listed as Tiffanie Zimmerman. If you have any questions after today's visit, please call [phone].

## 2017-02-15 NOTE — OP NOTES
Brief Operative Note    Preoperative Diagnosis:Sigmoid diverticulitis with colovesical   fistula. Postoperative Diagnosis:  * same *     Indications: Asked to place bilateral ureteral catheters preoperatively for  Dr. Kalie Warren      Discussed the risk of surgery including infection,  bleeding, , and the risks of general anesthetic including myocardial infarction, cerebrovascular accident, sudden death, or even reaction to anesthetic medications. The patient understands the risks, any and all questions were answered to the patient's satisfaction and they freely signed the consent for operation. Surgeon: Edith Orellana. Abida Mills MD    Assistants:   Anesthesia:  General     Procedure: Bilateral  ureteral catheter placement    Findings:  Cystoscopic examination under sterile conditions revealed no abnormalities within the bladder. Both ureteral orifices were orthotopic in nature effluxing clear urine. The UO's were easily cannulated with 5f olive tip 70cm ureteral catheters. No resistance encountered as they went into the renal pelvi bilaterally. The cystoscope was removed and a 16 f hyman was placed with 10cc of water in the balloon. Drainage bag was attached. Estimated Blood Loss:   0           Drains: none                Specimens: * No specimens in log *            Implants: * No implants in log *           Vital signs were stable and Dr. Kalie Warren will proceed with his operation at this time.

## 2017-02-16 NOTE — PROGRESS NOTES
To: Veronique Andrade PA-C, Kian Sullivan MD  From: Amor Mckeon MD    Thank you for referring Matteo Hughes. Looks great. Encounter Date: 2/14/2017    Subjective:      Corrinne Brocks is a 60 y.o. female presents for postop care following sigmoid colectomy and repair of colovesical fistula. Has no complaints today except the JAH. Feels great. Eating a regular diet without difficulty. Bowel movements are regular. Objective:     General:  alert, cooperative, no distress, appears stated age   Abdomen: soft, bowel sounds active, non-tender   Incision(s):  Well healed. JAH serous, scant. Assessment:     Diverticulitis, colovesical fistula. Status post above. Doing very well postoperatively. Plan:     Removed JAH. Will see her back in 3 weeks for final check. Switch to high fiber diet.       Amor Mckeon MD

## 2017-02-28 ENCOUNTER — OFFICE VISIT (OUTPATIENT)
Dept: SURGERY | Age: 57
End: 2017-02-28

## 2017-02-28 VITALS
DIASTOLIC BLOOD PRESSURE: 88 MMHG | SYSTOLIC BLOOD PRESSURE: 127 MMHG | RESPIRATION RATE: 20 BRPM | HEIGHT: 64 IN | OXYGEN SATURATION: 99 % | HEART RATE: 76 BPM | WEIGHT: 167 LBS | BODY MASS INDEX: 28.51 KG/M2

## 2017-02-28 DIAGNOSIS — Z09 POSTOPERATIVE EXAMINATION: Primary | ICD-10-CM

## 2017-02-28 NOTE — MR AVS SNAPSHOT
Visit Information Date & Time Provider Department Dept. Phone Encounter #  
 2/28/2017  1:40 PM Justin Ortiz MD [facility] [id number] Upcoming Health Maintenance Date Due  
 BREAST CANCER SCRN MAMMOGRAM 12/3/2017 DTaP/Tdap/Td series (2 - Td) 6/1/2025 COLONOSCOPY 11/9/2026 Allergies as of 2/28/2017  Review Complete On: 2/28/2017 By: Justin Ortiz MD  
  
 Severity Noted Reaction Type Reactions Cephalexin  01/16/2017    Hives Current Immunizations  Reviewed on 4/26/2015 Name Date Influenza Vaccine 9/1/2016 Influenza Vaccine (Quad) 10/8/2015 11:59 AM  
 Influenza Vaccine Intradermal PF 2/20/2014  9:13 AM  
 Tdap 6/1/2015 Not reviewed this visit You Were Diagnosed With   
  
 Codes Comments Postoperative examination    -  Primary ICD-10-CM: F05 ICD-9-CM: V67.00 Vitals BP  
  
  
  
  
  
 127/88 BMI and BSA Data Body Mass Index Body Surface Area  
 28.67 kg/m 2 1.85 m 2 Preferred Pharmacy Pharmacy Name Phone [facility] [address], [address] [place] [phone] Your Updated Medication List  
  
   
This list is accurate as of: 2/28/17  3:31 PM.  Always use your most recent med list.  
  
  
  
  
 ALPRAZolam 0.5 mg tablet Commonly known as:  XANAX  
TAKE ONE-HALF TO ONE TABLET BY MOUTH EVERY 8 HOURS AS NEEDED FOR ANXIETY, AGGRESSION, AND SLEEP **SHOULD LAST AT LEAST 30 DAYS**  
  
 lisinopril-hydroCHLOROthiazide 20-25 mg per tablet Commonly known as:  PRINZIDE, ZESTORETIC  
TAKE ONE TABLET BY MOUTH EVERY DAY  
  
 metoprolol succinate 50 mg XL tablet Commonly known as:  TOPROL-XL  
TAKE ONE TABLET BY MOUTH EVERY DAY  
  
 potassium chloride 10 mEq tablet Commonly known as:  KLOR-CON M10  
TAKE ONE TABLET BY MOUTH EVERY DAY ZyrTEC 10 mg tablet Generic drug:  cetirizine Take 10 mg by mouth daily as needed. Introducing [facility] & HEALTH SERVICES! Carrinerissa Gibsona introduces [employer] patient portal. Now you can access parts of your medical record, email your doctor's office, and request medication refills online. 1. In your internet browser, go to [email or web address] 2. Click on the First Time User? Click Here link in the Sign In box. You will see the New Member Sign Up page. 3. Enter your [employer] Access Code exactly as it appears below. You will not need to use this code after youve completed the sign-up process. If you do not sign up before the expiration date, you must request a new code. · [employer] Access Code: Tara Mohamud Expires: 5/29/2017  3:31 PM 
 
4. Enter the last four digits of your Social Security Number (xxxx) and Date of Birth (mm/dd/yyyy) as indicated and click Submit. You will be taken to the next sign-up page. 5. Create a [employer] ID. This will be your [employer] login ID and cannot be changed, so think of one that is secure and easy to remember. 6. Create a [employer] password. You can change your password at any time. 7. Enter your Password Reset Question and Answer. This can be used at a later time if you forget your password. 8. Enter your e-mail address. You will receive e-mail notification when new information is available in [address] 9. Click Sign Up. You can now view and download portions of your medical record. 10. Click the Download Summary menu link to download a portable copy of your medical information. If you have questions, please visit the Frequently Asked Questions section of the [employer] website. Remember, [employer] is NOT to be used for urgent needs. For medical emergencies, dial 911. Now available from your iPhone and Android! Please provide this summary of care documentation to your next provider. Your primary care clinician is listed as Agustina Jacques. If you have any questions after today's visit, please call [phone].

## 2017-02-28 NOTE — PROGRESS NOTES
To: Beverly Hightower PA-C, Ajay Ramos MD  From: Robert Hall MD    Thank you for referring Altagracia Miller. Looks great. Encounter Date: 2/28/2017    Subjective:      Pablo Alegria is a 60 y.o. female presents for postop care following sigmoid colectomy and repair of colovesical fistula. Has no complaints today except the JAH. Feels great. Eating a regular diet without difficulty. Bowel movements are regular. Objective:     General:  alert, cooperative, no distress, appears stated age   Abdomen: soft, bowel sounds active, non-tender   Incision(s):  Well healed. JAH serous, scant. Assessment:     Diverticulitis, colovesical fistula. Status post above. Doing very well postoperatively. No pain. Eating well. Normal BMs and urination. Plan:     High fiber diet. Metamucil. No further f/u required. Knows to call for concerns.        Robert Hall MD

## 2017-07-12 DIAGNOSIS — I10 ESSENTIAL HYPERTENSION WITH GOAL BLOOD PRESSURE LESS THAN 140/90: Chronic | ICD-10-CM

## 2017-07-12 RX ORDER — LISINOPRIL AND HYDROCHLOROTHIAZIDE 20; 25 MG/1; MG/1
TABLET ORAL
Qty: 90 TAB | Refills: 0 | Status: SHIPPED | OUTPATIENT
Start: 2017-07-12 | End: 2017-10-11 | Stop reason: SDUPTHER

## 2017-07-12 RX ORDER — METOPROLOL SUCCINATE 50 MG/1
TABLET, EXTENDED RELEASE ORAL
Qty: 90 TAB | Refills: 0 | Status: SHIPPED | OUTPATIENT
Start: 2017-07-12 | End: 2017-10-11 | Stop reason: SDUPTHER

## 2017-07-12 RX ORDER — POTASSIUM CHLORIDE 750 MG/1
TABLET, EXTENDED RELEASE ORAL
Qty: 90 TAB | Refills: 0 | Status: SHIPPED | OUTPATIENT
Start: 2017-07-12 | End: 2017-10-11 | Stop reason: SDUPTHER

## 2017-07-20 ENCOUNTER — OFFICE VISIT (OUTPATIENT)
Dept: INTERNAL MEDICINE CLINIC | Facility: CLINIC | Age: 57
End: 2017-07-20

## 2017-07-20 VITALS
HEIGHT: 64 IN | TEMPERATURE: 96.3 F | SYSTOLIC BLOOD PRESSURE: 132 MMHG | RESPIRATION RATE: 18 BRPM | HEART RATE: 81 BPM | BODY MASS INDEX: 29.71 KG/M2 | OXYGEN SATURATION: 97 % | DIASTOLIC BLOOD PRESSURE: 82 MMHG | WEIGHT: 174 LBS

## 2017-07-20 DIAGNOSIS — G47.00 INSOMNIA, UNSPECIFIED TYPE: ICD-10-CM

## 2017-07-20 DIAGNOSIS — I10 HYPERTENSION, GOAL BELOW 140/90: Primary | Chronic | ICD-10-CM

## 2017-07-20 RX ORDER — ALPRAZOLAM 0.5 MG/1
TABLET ORAL
Qty: 30 TAB | Refills: 1 | Status: SHIPPED | OUTPATIENT
Start: 2017-07-20 | End: 2018-04-13 | Stop reason: SDUPTHER

## 2017-07-20 NOTE — PROGRESS NOTES
HISTORY OF PRESENT ILLNESS  Deneen Irizarry is a 60 y.o. female. HPI  1) HTN - Well controlled at home in 120's/70s usually. Never high. BP in office today comes down to 132/82 at recheck. Pt notes she was rushing to get here on time today. BP Readings from Last 3 Encounters:   07/20/17 140/83   02/28/17 127/88   02/14/17 162/83     Pt has gained weight. She plans to start exercising again soon. She notes her knee felt better when she was exercising regularly. Wt Readings from Last 3 Encounters:   07/20/17 174 lb (78.9 kg)   02/28/17 167 lb (75.8 kg)   02/14/17 167 lb (75.8 kg)     2) Insomnia - taking Xanax 2x/week for sleep. Never taking it during the day for anxiety. Needs refill    Review of Systems   Constitutional: Negative for malaise/fatigue. Respiratory: Negative for cough and shortness of breath. Cardiovascular: Negative for chest pain and palpitations. Neurological: Negative for dizziness and loss of consciousness. Psychiatric/Behavioral: Negative for depression. The patient has insomnia. The patient is not nervous/anxious. Physical Exam   Constitutional: She is oriented to person, place, and time. She appears well-developed and well-nourished. No distress. HENT:   Head: Normocephalic and atraumatic. Neck: Neck supple. No JVD present. No thyromegaly present. No bruit bilateral carotid arteries. Cardiovascular: Normal rate, regular rhythm and normal heart sounds. Pulmonary/Chest: Effort normal and breath sounds normal. No respiratory distress. Musculoskeletal: She exhibits no edema. Lymphadenopathy:     She has no cervical adenopathy. Neurological: She is alert and oriented to person, place, and time. Skin: Skin is warm and dry. Psychiatric: She has a normal mood and affect. Her behavior is normal. Judgment and thought content normal.   Nursing note and vitals reviewed. ASSESSMENT and PLAN    ICD-10-CM ICD-9-CM    1.  Hypertension, goal below 140/90 I10 401.9 Controlled. Follow up q6 months.     2. Insomnia, unspecified type G47.00 780.52 Refill written: ALPRAZolam (XANAX) 0.5 mg tablet

## 2017-07-20 NOTE — PROGRESS NOTES
Room 8    Chief Complaint   Patient presents with    Hypertension     Follow up     1. Have you been to the ER, urgent care clinic since your last visit? Hospitalized since your last visit? No    2. Have you seen or consulted any other health care providers outside of the [address] [place] [address] since your last visit? Include any pap smears or colon screening.  No     No HM due

## 2017-07-20 NOTE — MR AVS SNAPSHOT
Visit Information Date & Time Provider Department Dept. Phone Encounter #  
 7/20/2017  4:00 PM Lorne Le PA-C [facility] [phone] [id number] Upcoming Health Maintenance Date Due INFLUENZA AGE 9 TO ADULT 8/1/2017 BREAST CANCER SCRN MAMMOGRAM 12/3/2017 DTaP/Tdap/Td series (2 - Td) 6/1/2025 COLONOSCOPY 11/9/2026 Allergies as of 7/20/2017  Review Complete On: 7/20/2017 By: Lorne Le PA-C Severity Noted Reaction Type Reactions Cephalexin  01/16/2017    Hives Current Immunizations  Reviewed on 4/26/2015 Name Date Influenza Vaccine 9/1/2016 Influenza Vaccine (Quad) 10/8/2015 11:59 AM  
 Influenza Vaccine Intradermal PF 2/20/2014  9:13 AM  
 Tdap 6/1/2015 Not reviewed this visit You Were Diagnosed With   
  
 Codes Comments Hypertension, goal below 140/90    -  Primary ICD-10-CM: I10 
ICD-9-CM: 401.9 Insomnia, unspecified type     ICD-10-CM: G47.00 ICD-9-CM: 780.52 Anxiety disorder, unspecified type     ICD-10-CM: F41.9 ICD-9-CM: 300.00 Vitals BP Pulse Temp Resp Height(growth percentile) Weight(growth percentile) 132/82 81 96.3 °F (35.7 °C) (Oral) 18 5' 4\" (1.626 m) 174 lb (78.9 kg) LMP SpO2 BMI OB Status Smoking Status 02/12/2005 97% 29.87 kg/m2 Hysterectomy Never Smoker Vitals History BMI and BSA Data Body Mass Index Body Surface Area  
 29.87 kg/m 2 1.89 m 2 Preferred Pharmacy Pharmacy Name Phone Ronak [facility]ger [facility], [address] [place] [address] [phone] Your Updated Medication List  
  
   
This list is accurate as of: 7/20/17  4:33 PM.  Always use your most recent med list.  
  
  
  
  
 ALPRAZolam 0.5 mg tablet Commonly known as:  Alejandro Fneg Take 1 at night as needed for sleep. KLOR-CON M10 10 mEq tablet Generic drug:  potassium chloride TAKE ONE TABLET BY MOUTH DAILY lisinopril-hydroCHLOROthiazide 20-25 mg per tablet Commonly known as:  PRINZIDE, ZESTORETIC  
TAKE ONE TABLET BY MOUTH DAILY  
  
 metoprolol succinate 50 mg XL tablet Commonly known as:  TOPROL-XL  
TAKE ONE TABLET BY MOUTH DAILY ZyrTEC 10 mg tablet Generic drug:  cetirizine Take 10 mg by mouth daily as needed. Prescriptions Printed Refills ALPRAZolam (XANAX) 0.5 mg tablet 1 Sig: Take 1 at night as needed for sleep. Class: Print Introducing [facility] & HEALTH SERVICES! Kamilla Mendoza introduces [employer] patient portal. Now you can access parts of your medical record, email your doctor's office, and request medication refills online. 1. In your internet browser, go to [email or web address] 2. Click on the First Time User? Click Here link in the Sign In box. You will see the New Member Sign Up page. 3. Enter your [employer] Access Code exactly as it appears below. You will not need to use this code after youve completed the sign-up process. If you do not sign up before the expiration date, you must request a new code. · [employer] Access Code: [email or web address] Expires: 10/18/2017  4:33 PM 
 
4. Enter the last four digits of your Social Security Number (xxxx) and Date of Birth (mm/dd/yyyy) as indicated and click Submit. You will be taken to the next sign-up page. 5. Create a Supersonic ID. This will be your [employer] login ID and cannot be changed, so think of one that is secure and easy to remember. 6. Create a [employer] password. You can change your password at any time. 7. Enter your Password Reset Question and Answer. This can be used at a later time if you forget your password. 8. Enter your e-mail address. You will receive e-mail notification when new information is available in [address] 9. Click Sign Up. You can now view and download portions of your medical record.  
10. Click the Download Summary menu link to download a portable copy of your medical information. If you have questions, please visit the Frequently Asked Questions section of the [employer] website. Remember, [employer] is NOT to be used for urgent needs. For medical emergencies, dial 911. Now available from your iPhone and Android! Please provide this summary of care documentation to your next provider. Your primary care clinician is listed as Faizan Villanueva. If you have any questions after today's visit, please call [phone].

## 2017-10-11 DIAGNOSIS — I10 ESSENTIAL HYPERTENSION WITH GOAL BLOOD PRESSURE LESS THAN 140/90: Chronic | ICD-10-CM

## 2017-10-11 RX ORDER — METOPROLOL SUCCINATE 50 MG/1
TABLET, EXTENDED RELEASE ORAL
Qty: 90 TAB | Refills: 0 | Status: SHIPPED | OUTPATIENT
Start: 2017-10-11 | End: 2018-01-08 | Stop reason: SDUPTHER

## 2017-10-11 RX ORDER — LISINOPRIL AND HYDROCHLOROTHIAZIDE 20; 25 MG/1; MG/1
TABLET ORAL
Qty: 90 TAB | Refills: 0 | Status: SHIPPED | OUTPATIENT
Start: 2017-10-11 | End: 2018-01-08 | Stop reason: SDUPTHER

## 2017-10-11 RX ORDER — POTASSIUM CHLORIDE 750 MG/1
TABLET, EXTENDED RELEASE ORAL
Qty: 90 TAB | Refills: 0 | Status: SHIPPED | OUTPATIENT
Start: 2017-10-11 | End: 2018-01-08 | Stop reason: SDUPTHER

## 2018-01-08 DIAGNOSIS — I10 ESSENTIAL HYPERTENSION WITH GOAL BLOOD PRESSURE LESS THAN 140/90: Chronic | ICD-10-CM

## 2018-01-08 RX ORDER — METOPROLOL SUCCINATE 50 MG/1
TABLET, EXTENDED RELEASE ORAL
Qty: 90 TAB | Refills: 0 | Status: SHIPPED | OUTPATIENT
Start: 2018-01-08 | End: 2018-04-13 | Stop reason: SDUPTHER

## 2018-01-08 RX ORDER — POTASSIUM CHLORIDE 750 MG/1
TABLET, EXTENDED RELEASE ORAL
Qty: 90 TAB | Refills: 0 | Status: SHIPPED | OUTPATIENT
Start: 2018-01-08 | End: 2018-04-13 | Stop reason: SDUPTHER

## 2018-01-08 RX ORDER — LISINOPRIL AND HYDROCHLOROTHIAZIDE 20; 25 MG/1; MG/1
TABLET ORAL
Qty: 90 TAB | Refills: 0 | Status: SHIPPED | OUTPATIENT
Start: 2018-01-08 | End: 2018-04-13 | Stop reason: SDUPTHER

## 2018-04-13 ENCOUNTER — OFFICE VISIT (OUTPATIENT)
Dept: INTERNAL MEDICINE CLINIC | Facility: CLINIC | Age: 58
End: 2018-04-13

## 2018-04-13 VITALS
HEART RATE: 62 BPM | OXYGEN SATURATION: 99 % | BODY MASS INDEX: 30.56 KG/M2 | DIASTOLIC BLOOD PRESSURE: 82 MMHG | TEMPERATURE: 97.6 F | HEIGHT: 64 IN | RESPIRATION RATE: 18 BRPM | SYSTOLIC BLOOD PRESSURE: 122 MMHG | WEIGHT: 179 LBS

## 2018-04-13 DIAGNOSIS — Z12.39 SCREENING FOR BREAST CANCER: ICD-10-CM

## 2018-04-13 DIAGNOSIS — I10 HYPERTENSION, GOAL BELOW 140/90: Primary | Chronic | ICD-10-CM

## 2018-04-13 DIAGNOSIS — Z00.00 ANNUAL PHYSICAL EXAM: ICD-10-CM

## 2018-04-13 DIAGNOSIS — F41.9 ANXIETY DISORDER, UNSPECIFIED TYPE: Chronic | ICD-10-CM

## 2018-04-13 DIAGNOSIS — F43.21 GRIEF: ICD-10-CM

## 2018-04-13 DIAGNOSIS — G47.00 INSOMNIA, UNSPECIFIED TYPE: ICD-10-CM

## 2018-04-13 DIAGNOSIS — E78.5 HYPERLIPIDEMIA WITH TARGET LDL LESS THAN 100: Chronic | ICD-10-CM

## 2018-04-13 DIAGNOSIS — E55.9 HYPOVITAMINOSIS D: Chronic | ICD-10-CM

## 2018-04-13 DIAGNOSIS — I10 ESSENTIAL HYPERTENSION WITH GOAL BLOOD PRESSURE LESS THAN 140/90: Chronic | ICD-10-CM

## 2018-04-13 DIAGNOSIS — E66.9 OBESITY (BMI 30-39.9): ICD-10-CM

## 2018-04-13 RX ORDER — POTASSIUM CHLORIDE 750 MG/1
TABLET, EXTENDED RELEASE ORAL
Qty: 90 TAB | Refills: 1 | Status: SHIPPED | OUTPATIENT
Start: 2018-04-13 | End: 2019-01-09 | Stop reason: SDUPTHER

## 2018-04-13 RX ORDER — METOPROLOL SUCCINATE 50 MG/1
50 TABLET, EXTENDED RELEASE ORAL DAILY
Qty: 90 TAB | Refills: 1 | Status: SHIPPED | OUTPATIENT
Start: 2018-04-13 | End: 2019-01-09 | Stop reason: SDUPTHER

## 2018-04-13 RX ORDER — LISINOPRIL AND HYDROCHLOROTHIAZIDE 20; 25 MG/1; MG/1
1 TABLET ORAL DAILY
Qty: 90 TAB | Refills: 1 | Status: SHIPPED | OUTPATIENT
Start: 2018-04-13 | End: 2019-01-09 | Stop reason: SDUPTHER

## 2018-04-13 RX ORDER — ALPRAZOLAM 0.5 MG/1
TABLET ORAL
Qty: 30 TAB | Refills: 2 | Status: SHIPPED | OUTPATIENT
Start: 2018-04-13 | End: 2018-10-15 | Stop reason: SDUPTHER

## 2018-04-13 NOTE — PROGRESS NOTES
HISTORY OF PRESENT ILLNESS  David Reid is a 59 y.o. female. Chief Complaint   Patient presents with    Complete Physical     Patient is fasting. Room 7     HPI  CE today - fasting for labs     Encounter Diagnoses   Name     Hypertension, goal below 140/90 . Uncontrolled at first check: (pt notes she did not take her medication because she is fasting today. Education given)  BP Readings from Last 1 Encounters:   04/13/18 144/82     Controlled at recheck:  BP Readings from Last 3 Encounters:   04/13/18 122/82   07/20/17 132/82   02/28/17 127/88         Hyperlipidemia with target LDL less than 100 Lab Results   Component Value Date/Time    Cholesterol, total 226 (H) 05/03/2016 05:19 PM    HDL Cholesterol 68 05/03/2016 05:19 PM    LDL, calculated 140 (H) 05/03/2016 05:19 PM    VLDL, calculated 18 05/03/2016 05:19 PM    Triglyceride 88 05/03/2016 05:19 PM         Anxiety disorder, unspecified type Taking Xanax 0.5 mg - never lately because ran out of refills several months ago. When had it, took it several nights/week to help with anxiety/sleep. Father passed away with sudden MI in Jan. Jil Rod x2 passed away in Feb. Mom fell and hit head in March. Pt's sister is caring for her. 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  Hypovitaminosis D Lab Results   Component Value Date/Time    VITAMIN D, 25-HYDROXY 57.1 05/03/2016 05:19 PM           Obesity (BMI 30-39. 9) Wt Readings from Last 3 Encounters:   04/13/18 179 lb (81.2 kg)   07/20/17 174 lb (78.9 kg)   02/28/17 167 lb (75.8 kg)     Has gained 5 lbs since last summer. Eating late at night and not exercising due to life stresses. Review of Systems   Respiratory: Negative for shortness of breath. Cardiovascular: Negative for chest pain and palpitations. Neurological: Negative for dizziness, loss of consciousness and weakness.    Psychiatric/Behavioral: Negative for depression and substance abuse. The patient is nervous/anxious and has insomnia. Physical Exam   Constitutional: She is oriented to person, place, and time. She appears well-developed and well-nourished. No distress. HENT:   Head: Normocephalic and atraumatic. Neck: Neck supple. No JVD present. Cardiovascular: Normal rate, regular rhythm and normal heart sounds. Pulmonary/Chest: Effort normal and breath sounds normal. No respiratory distress. Musculoskeletal: She exhibits no edema. Neurological: She is alert and oriented to person, place, and time. Skin: Skin is warm and dry. Psychiatric: She has a normal mood and affect. Her behavior is normal. Judgment and thought content normal.   Nursing note and vitals reviewed. ASSESSMENT and PLAN    ICD-10-CM ICD-9-CM    1. Hypertension, goal below 140/90 I10 401.9 Controlled   METABOLIC PANEL, COMPREHENSIVE   2. Hyperlipidemia with target LDL less than 100 E78.5 272.4 LIPID PANEL   3. Anxiety disorder, unspecified type F41.9 300.00 REFERRAL TO SOCIAL WORK   4. Hypovitaminosis D E55.9 268.9 VITAMIN D, 1, 25 DIHYDROXY   5. Obesity (BMI 30-39. 9) E66.9 278.00 Discussed diet/exercise and options for/importance of losing weight. 6. Annual physical exam Z00.00 V70.0 CBC WITH AUTOMATED DIFF      HEMOGLOBIN A1C W/O EAG      LIPID PANEL      TSH RFX ON ABNORMAL TO FREE T4   7. Grief F43.20 309.0 REFERRAL TO SOCIAL WORK   8. Screening for breast cancer Z12.31 V76.10 [facility] MAMMO BI SCREENING INCL CAD   9. Essential hypertension with goal blood pressure less than 140/90 I10 401.9 potassium chloride (KLOR-CON) 10 mEq tablet      lisinopril-hydroCHLOROthiazide (PRINZIDE, ZESTORETIC) 20-25 mg per tablet      metoprolol succinate (TOPROL-XL) 50 mg XL tablet   10.  Insomnia, unspecified type G47.00 780.52 ALPRAZolam (XANAX) 0.5 mg tablet

## 2018-04-13 NOTE — PROGRESS NOTES
Chief Complaint   Patient presents with    Complete Physical     Patient is fasting. Room 7     1. Have you been to the ER, urgent care clinic since your last visit? Hospitalized since your last visit? No    2. Have you seen or consulted any other health care providers outside of the [facility] since your last visit? Include any pap smears or colon screening.  No     Health Maintenance Due   Topic Date Due    Influenza Age 5 to Adult  08/01/2017    BREAST CANCER SCRN MAMMOGRAM  12/03/2017

## 2018-04-13 NOTE — MR AVS SNAPSHOT
[facility] 
[phone] Patient: Florin Valencia MRN: [id number] ZNE:[id number] Visit Information Date & Time Provider Department Dept. Phone Encounter #  
 4/13/2018  9:30 AM Kat Lamar PA-C [facility] [phone] [id number] Follow-up Instructions Return in about 6 months (around 10/13/2018) for Anxiety/HTN follow up. Upcoming Health Maintenance Date Due Influenza Age 5 to Adult 8/1/2017 BREAST CANCER SCRN MAMMOGRAM 12/3/2017 DTaP/Tdap/Td series (2 - Td) 6/1/2025 COLONOSCOPY 11/9/2026 Allergies as of 4/13/2018  Review Complete On: 4/13/2018 By: Sarah Hair LPN Severity Noted Reaction Type Reactions Cephalexin  01/16/2017    Hives Current Immunizations  Reviewed on 4/26/2015 Name Date Influenza Vaccine 9/1/2016 Influenza Vaccine (Quad) 10/8/2015 11:59 AM  
 Influenza Vaccine Intradermal PF 2/20/2014  9:13 AM  
 Tdap 6/1/2015 Not reviewed this visit You Were Diagnosed With   
  
 Codes Comments Hypertension, goal below 140/90    -  Primary ICD-10-CM: I10 
ICD-9-CM: 401.9 Hyperlipidemia with target LDL less than 100     ICD-10-CM: E78.5 ICD-9-CM: 272.4 Anxiety disorder, unspecified type     ICD-10-CM: F41.9 ICD-9-CM: 300.00 Hypovitaminosis D     ICD-10-CM: E55.9 ICD-9-CM: 268.9 Obesity (BMI 30-39. 9)     ICD-10-CM: E66.9 ICD-9-CM: 278.00 Annual physical exam     ICD-10-CM: Z00.00 ICD-9-CM: V70.0 Grief     ICD-10-CM: S42.79 ICD-9-CM: 309.0 Screening for breast cancer     ICD-10-CM: Z12.31 
ICD-9-CM: V76.10 Essential hypertension with goal blood pressure less than 140/90     ICD-10-CM: I10 
ICD-9-CM: 401.9 Insomnia, unspecified type     ICD-10-CM: G47.00 ICD-9-CM: 780.52 Vitals BP Pulse Temp Resp Height(growth percentile) Weight(growth percentile) 122/82 62 97.6 °F (36.4 °C) (Oral) 18 5' 4\" (1.626 m) 179 lb (81.2 kg) LMP SpO2 BMI OB Status Smoking Status 02/12/2005 99% 30.73 kg/m2 Hysterectomy Never Smoker Vitals History BMI and BSA Data Body Mass Index Body Surface Area 30.73 kg/m 2 1.91 m 2 Preferred Pharmacy Pharmacy Name Phone [address] [phone] Your Updated Medication List  
  
   
This list is accurate as of 4/13/18 10:18 AM.  Always use your most recent med list.  
  
  
  
  
 ALPRAZolam 0.5 mg tablet Commonly known as:  Jestine Pies Take 1 at night as needed for sleep.  
  
 lisinopril-hydroCHLOROthiazide 20-25 mg per tablet Commonly known as:  Lynder Morrison Take 1 Tab by mouth daily. metoprolol succinate 50 mg XL tablet Commonly known as:  TOPROL-XL Take 1 Tab by mouth daily. potassium chloride 10 mEq tablet Commonly known as:  KLOR-CON  
TAKE ONE TABLET BY MOUTH DAILY ZyrTEC 10 mg tablet Generic drug:  cetirizine Take 10 mg by mouth daily as needed. Prescriptions Printed Refills ALPRAZolam (XANAX) 0.5 mg tablet 2 Sig: Take 1 at night as needed for sleep. Class: Print Prescriptions Sent to Pharmacy Refills  
 potassium chloride (KLOR-CON) 10 mEq tablet 1 Sig: TAKE ONE TABLET BY MOUTH DAILY Class: Normal  
 Pharmacy: [address] Ph #: [phone]  
 lisinopril-hydroCHLOROthiazide (PRINZIDE, ZESTORETIC) 20-25 mg per tablet 1 Sig: Take 1 Tab by mouth daily. Class: Normal  
 Pharmacy: [address] Ph #: [phone] Route: Oral  
 metoprolol succinate (TOPROL-XL) 50 mg XL tablet 1 Sig: Take 1 Tab by mouth daily. Class: Normal  
 Pharmacy: [address] Ph #: [phone]  Route: Oral  
  
 We Performed the Following CBC WITH AUTOMATED DIFF [83283 CPT(R)] HEMOGLOBIN A1C W/O EAG [36242 CPT(R)] LIPID PANEL [38681 CPT(R)] METABOLIC PANEL, COMPREHENSIVE [14254 CPT(R)] REFERRAL TO SOCIAL WORK [JEY77 Custom] Comments:  
 [email or web address] [facility]: 
[address] [facility], [address] [facility] 
[phone] Macy Nguyễn, [address] ,[address], [place], [address] [place] [address] 
[phone] [facility] [address] [place], [address] [facility]   
[phone] Milag[address] [place], [address] 
[phone] Eugenia Kiser, PhD 
[address] [facility] 
[phone] TSH RFX ON ABNORMAL TO FREE T4 [[id number] Custom] VITAMIN D, 1, 25 DIHYDROXY [14984 CPT(R)] Follow-up Instructions Return in about 6 months (around 10/13/2018) for Anxiety/HTN follow up. To-Do List   
 04/13/2018 Imaging:  [facility] MAMMO BI SCREENING INCL CAD Introducing [facility]! [employer] introduces [employer] patient portal. Now you can access parts of your medical record, email your doctor's office, and request medication refills online. 1. In your internet browser, go to [email or web address] 2. Click on the First Time User? Click Here link in the Sign In box. You will see the New Member Sign Up page. 3. Enter your Park.[employer] Access Code exactly as it appears below. You will not need to use this code after youve completed the sign-up process. If you do not sign up before the expiration date, you must request a new code. · [employer] Access Code: [email or web address] Expires: 7/12/2018  9:59 AM 
 
4. Enter the last four digits of your Social Security Number (xxxx) and Date of Birth (mm/dd/yyyy) as indicated and click Submit. You will be taken to the next sign-up page. 5. Create a MyChart ID.  This will be your [employer] login ID and cannot be changed, so think of one that is secure and easy to remember. 6. Create a CareCloud password. You can change your password at any time. 7. Enter your Password Reset Question and Answer. This can be used at a later time if you forget your password. 8. Enter your e-mail address. You will receive e-mail notification when new information is available in [address] 9. Click Sign Up. You can now view and download portions of your medical record. 10. Click the Download Summary menu link to download a portable copy of your medical information. If you have questions, please visit the Frequently Asked Questions section of the CareCloud website. Remember, CareCloud is NOT to be used for urgent needs. For medical emergencies, dial 911. Now available from your iPhone and Android! Please provide this summary of care documentation to your next provider. Your primary care clinician is listed as Lila Vora. If you have any questions after today's visit, please call [phone].

## 2018-04-16 PROBLEM — R73.09 ELEVATED HEMOGLOBIN A1C: Status: ACTIVE | Noted: 2018-04-16

## 2018-04-16 LAB
1,25(OH)2D3 SERPL-MCNC: 50.3 PG/ML (ref 19.9–79.3)
ALBUMIN SERPL-MCNC: 4.7 G/DL (ref 3.5–5.5)
ALBUMIN/GLOB SERPL: 2 {RATIO} (ref 1.2–2.2)
ALP SERPL-CCNC: 53 IU/L (ref 39–117)
ALT SERPL-CCNC: 27 IU/L (ref 0–32)
AST SERPL-CCNC: 16 IU/L (ref 0–40)
BASOPHILS # BLD AUTO: 0 X10E3/UL (ref 0–0.2)
BASOPHILS NFR BLD AUTO: 1 %
BILIRUB SERPL-MCNC: 0.4 MG/DL (ref 0–1.2)
BUN SERPL-MCNC: 12 MG/DL (ref 6–24)
BUN/CREAT SERPL: 16 (ref 9–23)
CALCIUM SERPL-MCNC: 9.6 MG/DL (ref 8.7–10.2)
CHLORIDE SERPL-SCNC: 98 MMOL/L (ref 96–106)
CHOLEST SERPL-MCNC: 219 MG/DL (ref 100–199)
CO2 SERPL-SCNC: 23 MMOL/L (ref 18–29)
CREAT SERPL-MCNC: 0.74 MG/DL (ref 0.57–1)
EOSINOPHIL # BLD AUTO: 0.1 X10E3/UL (ref 0–0.4)
EOSINOPHIL NFR BLD AUTO: 2 %
ERYTHROCYTE [DISTWIDTH] IN BLOOD BY AUTOMATED COUNT: 14.1 % (ref 12.3–15.4)
GFR SERPLBLD CREATININE-BSD FMLA CKD-EPI: 103 ML/MIN/1.73
GFR SERPLBLD CREATININE-BSD FMLA CKD-EPI: 89 ML/MIN/1.73
GLOBULIN SER CALC-MCNC: 2.3 G/DL (ref 1.5–4.5)
GLUCOSE SERPL-MCNC: 98 MG/DL (ref 65–99)
HBA1C MFR BLD: 6.1 % (ref 4.8–5.6)
HCT VFR BLD AUTO: 39 % (ref 34–46.6)
HDLC SERPL-MCNC: 58 MG/DL
HGB BLD-MCNC: 13 G/DL (ref 11.1–15.9)
IMM GRANULOCYTES # BLD: 0 X10E3/UL (ref 0–0.1)
IMM GRANULOCYTES NFR BLD: 0 %
LDLC SERPL CALC-MCNC: 147 MG/DL (ref 0–99)
LYMPHOCYTES # BLD AUTO: 1.9 X10E3/UL (ref 0.7–3.1)
LYMPHOCYTES NFR BLD AUTO: 46 %
MCH RBC QN AUTO: 29.6 PG (ref 26.6–33)
MCHC RBC AUTO-ENTMCNC: 33.3 G/DL (ref 31.5–35.7)
MCV RBC AUTO: 89 FL (ref 79–97)
MONOCYTES # BLD AUTO: 0.4 X10E3/UL (ref 0.1–0.9)
MONOCYTES NFR BLD AUTO: 8 %
NEUTROPHILS # BLD AUTO: 1.8 X10E3/UL (ref 1.4–7)
NEUTROPHILS NFR BLD AUTO: 43 %
PLATELET # BLD AUTO: 194 X10E3/UL (ref 150–379)
POTASSIUM SERPL-SCNC: 3.7 MMOL/L (ref 3.5–5.2)
PROT SERPL-MCNC: 7 G/DL (ref 6–8.5)
RBC # BLD AUTO: 4.39 X10E6/UL (ref 3.77–5.28)
SODIUM SERPL-SCNC: 139 MMOL/L (ref 134–144)
TRIGL SERPL-MCNC: 71 MG/DL (ref 0–149)
TSH SERPL DL<=0.005 MIU/L-ACNC: 1.79 UIU/ML (ref 0.45–4.5)
VLDLC SERPL CALC-MCNC: 14 MG/DL (ref 5–40)
WBC # BLD AUTO: 4.2 X10E3/UL (ref 3.4–10.8)

## 2018-06-09 ENCOUNTER — OFFICE VISIT (OUTPATIENT)
Dept: FAMILY PLANNING/WOMEN'S HEALTH CLINIC | Age: 58
End: 2018-06-09

## 2018-06-09 ENCOUNTER — HOSPITAL ENCOUNTER (OUTPATIENT)
Dept: MAMMOGRAPHY | Age: 58
Discharge: HOME | End: 2018-06-09

## 2018-06-09 VITALS — SYSTOLIC BLOOD PRESSURE: 165 MMHG | DIASTOLIC BLOOD PRESSURE: 91 MMHG

## 2018-06-09 DIAGNOSIS — Z12.31 SCREENING MAMMOGRAM, ENCOUNTER FOR: Primary | ICD-10-CM

## 2018-06-09 DIAGNOSIS — Z12.31 SCREENING MAMMOGRAM, ENCOUNTER FOR: ICD-10-CM

## 2018-06-09 PROCEDURE — 77067 SCR MAMMO BI INCL CAD: CPT

## 2018-06-09 NOTE — PROGRESS NOTES
EVERY WOMANS LIFE HISTORY QUESTIONNAIRE       No Yes Comments   Has a doctor ever seen or felt anything wrong with your breast? []                                  [x]                                  5 years ago, lump felt by dr, right   Have you ever had a breast biopsy? []                                  [x]                                  Right, found benign        When and where was last mammogram performed? 2016    Have you ever been told that there was a problem on your mammogram?   No Yes Comments   []                                  [x]                                  Right breast, 5 years ago, benign     Do you have breast implants? No Yes Comments   [x]                                  []                                       When was your last Pap test performed? [facility], vaginal exam(hysterectomy)    Have you ever had an abnormal Pap test?   No Yes Comments   [x]                                  []                                       Have you had a hysterectomy? No Yes Comments (why)   []                                  [x]                                  2005,fibroids     Have you ever been diagnosed with any type of Cancer   No Yes Comments (type,when,where,type of treatment   [x]                                  []                                          Has a family member been diagnosed with breast or ovarian cancer? No Yes Comments (which family members, and type   [x]                                  []                                       Did your mother take NAVIN? No Yes Unknown   [x]                                  []                                       Do you have a history of HIV exposure? No Yes    [x]                                  []                                         Have you been through menopause?    No Yes Date of LMP   [x]                                  []                                  2005     Are you taking hormone replacement therapy (HRT)     No Yes Comments   [x]                                  []                                       How many times have you been pregnant? 1       Number of live births ? 1    Are you experiencing any of the following? No Yes Comments   Nipple Discharge [x]                                  []                                     Breast Lump/Masses [x]                                  []                                     Breast Skin Changes [x]                                  []                                          No Yes Comments   Vaginal Discharge [x]                                  []                                     Abnormal/unusual vaginal bleeding [x]                                  []                                         Are you experiencing any other health problems?  HBP

## 2018-06-09 NOTE — PROGRESS NOTES
HISTORY OF PRESENT ILLNESS  Hayley Buenrostro is a 59 y.o. female. HPI   56yoF,  here for EWL exam. Ms. Dane Braxton denies abnormal SBE's, performs intermittently. Her last mammogram was 2 years ago. H/O right breast biopsy in  that was negative. She denies abnormal vaginal discharge and bloating, no pelvic pain. She doesn't want a pelvic/rectal exam today but will think about returning for this at a later date. H/O hysterectomy in  for fibroids. She believes it was GANGA. She has had colonoscopy x2 in the last 5 years d/t a diverticulitis diagnosis, negative findings for both. She is aware her BP is elevated today but will recheck it this week. Review of Systems   Constitutional: Negative for diaphoresis, malaise/fatigue and weight loss. Gastrointestinal: Negative for abdominal pain, blood in stool, constipation and diarrhea. Physical Exam   Constitutional: She is oriented to person, place, and time. She appears well-developed and well-nourished. Pulmonary/Chest: Right breast exhibits no inverted nipple, no mass, no nipple discharge, no skin change and no tenderness. Left breast exhibits no inverted nipple, no mass, no nipple discharge, no skin change and no tenderness. Breasts are symmetrical.   Lymphadenopathy:     She has no cervical adenopathy. She has no axillary adenopathy. Right: No supraclavicular adenopathy present. Left: No supraclavicular adenopathy present. Neurological: She is alert and oriented to person, place, and time. Skin: Skin is warm and dry. Psychiatric: She has a normal mood and affect. Her behavior is normal. Thought content normal.   Nursing note and vitals reviewed. ASSESSMENT and PLAN  1. EWL exam  2. CBE benign  3. Screening mammogram today  4. H/O hysterectomy,       -fibroids  5.  BP GL's reviewed per [facility]      -recheck BP x 3 reading this week; report elevated findings to PCP  6. Pelvic/rectal deferred      -encourage F/U with a pelvic/rectal exam at a later date

## 2019-01-09 DIAGNOSIS — I10 ESSENTIAL HYPERTENSION WITH GOAL BLOOD PRESSURE LESS THAN 140/90: Chronic | ICD-10-CM

## 2019-01-09 RX ORDER — POTASSIUM CHLORIDE 750 MG/1
TABLET, FILM COATED, EXTENDED RELEASE ORAL
Qty: 30 TAB | Refills: 0 | Status: SHIPPED | OUTPATIENT
Start: 2019-01-09 | End: 2019-04-09 | Stop reason: SDUPTHER

## 2019-01-09 RX ORDER — METOPROLOL SUCCINATE 50 MG/1
TABLET, EXTENDED RELEASE ORAL
Qty: 30 TAB | Refills: 0 | Status: SHIPPED | OUTPATIENT
Start: 2019-01-09 | End: 2019-04-09 | Stop reason: SDUPTHER

## 2019-01-09 RX ORDER — LISINOPRIL AND HYDROCHLOROTHIAZIDE 20; 25 MG/1; MG/1
TABLET ORAL
Qty: 30 TAB | Refills: 0 | Status: SHIPPED | OUTPATIENT
Start: 2019-01-09 | End: 2019-04-09 | Stop reason: SDUPTHER

## 2019-01-09 NOTE — TELEPHONE ENCOUNTER
Left detailed message for patient to call back to inform patient that the request for a medication refill was approved for 30 days.  Need an appointment for further refill

## 2019-04-09 ENCOUNTER — OFFICE VISIT (OUTPATIENT)
Dept: INTERNAL MEDICINE CLINIC | Facility: CLINIC | Age: 59
End: 2019-04-09

## 2019-04-09 VITALS
HEIGHT: 64 IN | SYSTOLIC BLOOD PRESSURE: 137 MMHG | TEMPERATURE: 98.1 F | BODY MASS INDEX: 31.58 KG/M2 | WEIGHT: 185 LBS | HEART RATE: 83 BPM | RESPIRATION RATE: 16 BRPM | DIASTOLIC BLOOD PRESSURE: 81 MMHG

## 2019-04-09 DIAGNOSIS — G47.00 INSOMNIA, UNSPECIFIED TYPE: ICD-10-CM

## 2019-04-09 DIAGNOSIS — E55.9 HYPOVITAMINOSIS D: Chronic | ICD-10-CM

## 2019-04-09 DIAGNOSIS — R73.09 ELEVATED HEMOGLOBIN A1C: ICD-10-CM

## 2019-04-09 DIAGNOSIS — J30.89 ENVIRONMENTAL AND SEASONAL ALLERGIES: ICD-10-CM

## 2019-04-09 DIAGNOSIS — M25.561 CHRONIC PAIN OF RIGHT KNEE: ICD-10-CM

## 2019-04-09 DIAGNOSIS — Z00.00 ANNUAL PHYSICAL EXAM: ICD-10-CM

## 2019-04-09 DIAGNOSIS — I10 ESSENTIAL HYPERTENSION WITH GOAL BLOOD PRESSURE LESS THAN 140/90: Primary | Chronic | ICD-10-CM

## 2019-04-09 DIAGNOSIS — G89.29 CHRONIC PAIN OF RIGHT KNEE: ICD-10-CM

## 2019-04-09 RX ORDER — POTASSIUM CHLORIDE 750 MG/1
TABLET, FILM COATED, EXTENDED RELEASE ORAL
Qty: 90 TAB | Refills: 0 | Status: SHIPPED | OUTPATIENT
Start: 2019-04-09 | End: 2019-07-04 | Stop reason: SDUPTHER

## 2019-04-09 RX ORDER — LISINOPRIL AND HYDROCHLOROTHIAZIDE 20; 25 MG/1; MG/1
TABLET ORAL
Qty: 90 TAB | Refills: 0 | Status: SHIPPED | OUTPATIENT
Start: 2019-04-09 | End: 2019-07-04 | Stop reason: SDUPTHER

## 2019-04-09 RX ORDER — CETIRIZINE HCL 10 MG
10 TABLET ORAL DAILY
Qty: 90 TAB | Refills: 0 | Status: SHIPPED | OUTPATIENT
Start: 2019-04-09 | End: 2020-02-03

## 2019-04-09 RX ORDER — ALPRAZOLAM 0.5 MG/1
TABLET ORAL
Qty: 30 TAB | Refills: 0 | Status: SHIPPED | OUTPATIENT
Start: 2019-04-09 | End: 2019-07-09 | Stop reason: SDUPTHER

## 2019-04-09 RX ORDER — METOPROLOL SUCCINATE 50 MG/1
TABLET, EXTENDED RELEASE ORAL
Qty: 90 TAB | Refills: 0 | Status: SHIPPED | OUTPATIENT
Start: 2019-04-09 | End: 2019-07-04 | Stop reason: SDUPTHER

## 2019-04-09 NOTE — PROGRESS NOTES
Subjective:  
  
Thomas Mansfield is a 59 y.o. female who presents today for follow up on chronic care. Cardiovascular Review The patient has hypertension. Since last visit: no changes. She reports taking medications as instructed, no medication side effects noted, no TIA's, no chest pain on exertion, no swelling of ankles, no palpitations. Diet and Lifestyle: generally follows a low fat low cholesterol diet, generally follows a low sodium diet, no formal exercise but active during the day. Labs: labs pending. She was seen at the [facility] ED 3 weeks. Labs and imaging, ekg all normal.  
 
Mental Health Review Patient is seen for anxiety disorder. Since last visit: she notes a lot of family stress, she notes she has had several loss in the family. Ongoing symptoms include: insomnia, feelings of losing control, difficulty concentrating. She denies: SI/SA. Reported side effects from the treatment: none. Knee pain: she notes persistent right knee pain, she states it feels like her leg will give out on her and it has made her fall several times. She states she feel on a pinecone a year ago and was initally evaluated by urgent care. She has not had it reevaluated, she has taken tylenol to resolve the symptoms. She denies swelling, numbness, and tingling. She notes the most pain when she first wakes up, pain improves with activity. Patient Active Problem List  
 Diagnosis Date Noted  Elevated hemoglobin A1c 04/16/2018  Obesity (BMI 30-39.9) 04/13/2018  Insomnia 07/20/2017  Diverticulitis 02/01/2017  Colovesical fistula 02/01/2017  Diverticulitis of intestine with abscess 09/13/2016  Hypovitaminosis D 04/27/2015  Microscopic hematuria 04/27/2015  Sigmoid diverticulitis 02/14/2014  Diverticulosis of large intestine with hemorrhage 02/14/2014 Class: Chronic  Anxiety disorder 02/14/2014 Class: Chronic  Dehydration 02/14/2014  Hyperlipidemia with target LDL less than 100 05/13/2013 Class: Chronic  Hypertension, goal below 140/90 10/16/2012 Class: Chronic Current Outpatient Medications Medication Sig Dispense Refill  lisinopril-hydroCHLOROthiazide (PRINZIDE, ZESTORETIC) 20-25 mg per tablet TAKE ONE TABLET BY MOUTH DAILY 30 Tab 0  
 metoprolol succinate (TOPROL-XL) 50 mg XL tablet TAKE ONE TABLET BY MOUTH DAILY 30 Tab 0  
 potassium chloride SR (KLOR-CON 10) 10 mEq tablet TAKE ONE TABLET BY MOUTH DAILY 30 Tab 0  ALPRAZolam (XANAX) 0.5 mg tablet TAKE ONE TABLET BY MOUTH EVERY NIGHT AT BEDTIME AS NEEDED FOR SLEEP 30 Tab 0  
 cetirizine (ZYRTEC) 10 mg tablet Take 10 mg by mouth daily as needed. Allergies Allergen Reactions  Cephalexin Hives Past Medical History:  
Diagnosis Date  Diverticulosis of large intestine with hemorrhage 02/14/2014  
 distal sigmoid colon, mid-sigmoid colon, descending colon - colonoscopy 11/9/16  Hyperlipemia  5/2016; not on medication  Hypertension  Hypovitaminosis D 04/27/2015  
 level normal 5/2016  Microscopic hematuria 4/27/2015  Psychiatric disorder   
 anxiety Past Surgical History:  
Procedure Laterality Date  ABDOMEN SURGERY PROC UNLISTED  02/01/2017  
 laparoscopic sigmoid colectomy Dr. Dennis Abel  HX BREAST BIOPSY Right Stereo Bx 2007 - BENIGN  
 HX COLONOSCOPY  07/23/2015  HX GYN    
 ectopic pregnancy  HX HYSTERECTOMY  3/17/2005 [facility] Review of Systems A comprehensive review of systems was negative except for that written in the HPI. Objective:  
 
Visit Vitals /81 Pulse 83 Temp 98.1 °F (36.7 °C) (Oral) Resp 16 Ht 5' 4\" (1.626 m) Wt 185 lb (83.9 kg) LMP 02/12/2005 BMI 31.76 kg/m² General appearance: alert, cooperative, no distress, appears stated age Head: Normocephalic, without obvious abnormality, atraumatic Eyes: negative Neck: supple, symmetrical, trachea midline, no adenopathy, thyroid: not enlarged, symmetric, no tenderness/mass/nodules, no carotid bruit and no JVD Back: symmetric, no curvature. ROM normal. No CVA tenderness. Lungs: clear to auscultation bilaterally Heart: regular rate and rhythm, S1, S2 normal, no murmur, click, rub or gallop Extremities: extremities normal, atraumatic, no cyanosis or edema. Right patella with no pain with palpation, no edema, drawer sign negative. Right wrist with ganglio Pulses: 2+ and symmetric Skin: Skin color, texture, turgor normal. No rashes or lesions Neurologic: Alert and oriented X 3, normal strength and tone. Normal symmetric reflexes. Normal coordination and gait Psych: appropriate mood, speech, affect Nursing note and vitals reviewed Assessment/Plan: ICD-10-CM ICD-9-CM 1. Essential hypertension with goal blood pressure less than 140/90 I10 401.9 lisinopril-hydroCHLOROthiazide (PRINZIDE, ZESTORETIC) 20-25 mg per tablet  
   metoprolol succinate (TOPROL-XL) 50 mg XL tablet  
   potassium chloride SR (KLOR-CON 10) 10 mEq tablet LIPID PANEL  
   METABOLIC PANEL, COMPREHENSIVE 2. Insomnia, unspecified type G47.00 780.52 ALPRAZolam (XANAX) 0.5 mg tablet 3. Environmental and seasonal allergies J30.89 477.8 cetirizine (ZYRTEC) 10 mg tablet 4. Annual physical exam Z00.00 V70.0 CBC WITH AUTOMATED DIFF  
   LIPID PANEL  
   METABOLIC PANEL, COMPREHENSIVE  
   VITAMIN D, 25 HYDROXY  
   HEMOGLOBIN A1C W/O EAG  
5. Hypovitaminosis D E55.9 268.9 VITAMIN D, 25 HYDROXY 6. Elevated hemoglobin A1c R73.09 790.29 HEMOGLOBIN A1C W/O EAG  
7. Chronic pain of right knee M25.561 719.46 REFERRAL TO SPORTS MEDICINE  
 G89.29 338.29 Labs pending, she will get this done at [address] [place] [address] and set up follow up with CPE. Follow-up and Dispositions · Return in about 3 months (around 7/9/2019) for Schedule your physical, get labs done beforehand. Advised her to call back or return to office if symptoms worsen/change/persist. 
Discussed expected course/resolution/complications of diagnosis in detail with patient. Medication risks/benefits/costs/interactions/alternatives discussed with patient. She was given an after visit summary which includes diagnoses, current medications, & vitals. She expressed understanding with the diagnosis and plan.

## 2019-04-09 NOTE — PATIENT INSTRUCTIONS
Trigger Finger: Care Instructions Your Care Instructions A trigger finger is a finger stuck in a bent position. The bent finger usually straightens out on its own. A trigger finger can be painful, but it normally is not a serious problem. Trigger fingers seem to occur more in some groups of people. These include people who have diabetes or arthritis or who have injured their hands in the past. This problem also occurs in musicians and people who  tools often. Rest, exercises, and other things you can do at home may help your trigger finger relax so that it can bend as it should. You may get a corticosteroid shot. This can reduce swelling and pain. Your doctor may put a splint on your finger. This will give your finger some rest and avoid irritating the joint. You may need surgery if the finger keeps locking in a bent position. Follow-up care is a key part of your treatment and safety. Be sure to make and go to all appointments, and call your doctor if you are having problems. It's also a good idea to know your test results and keep a list of the medicines you take. How can you care for yourself at home? · If your doctor put a splint on your finger, wear the splint as directed. Do not remove it until your doctor says you can. · You may need to change your activities to avoid movements that irritate the finger. · If your finger is swollen, put ice or a cold pack on your finger for 10 to 20 minutes at a time. Try to do this every 1 to 2 hours for the next 3 days (when you are awake) or until the swelling goes down. Put a thin cloth between the ice and your skin. · Prop up your hand on a pillow when you ice it or anytime you sit or lie down during the next 3 days. Try to keep it above the level of your heart. This will help reduce swelling. · Take your medicines exactly as prescribed. Call your doctor if you think you are having a problem with your medicine. · Ask your doctor if you can take an over-the-counter pain medicine, such as acetaminophen (Tylenol), ibuprofen (Advil, Motrin), or naproxen (Aleve). Be safe with medicines. Read and follow all instructions on the label. · If your doctor recommends exercises, do them as directed. When should you call for help? Call your doctor now or seek immediate medical care if: 
  · Your finger locks in a bent position and will not straighten.  
 Watch closely for changes in your health, and be sure to contact your doctor if: 
  · You do not get better as expected. Where can you learn more? Go to http://jean carlos-rufus.info/. Enter M826 in the search box to learn more about \"Trigger Finger: Care Instructions. \" Current as of: September 20, 2018 Content Version: 11.9 © 1170-7597 [employer]. Care instructions adapted under license by [employer] (which disclaims liability or warranty for this information). If you have questions about a medical condition or this instruction, always ask your healthcare professional. [address] any warranty or liability for your use of this information. Learning About the [address] [facility] Xiant Diet What is the Mediterranean diet? The Mediterranean diet is a style of eating rather than a diet plan. It features foods eaten in [place], [place], [place] and [place], and other countries along the [facility]. It emphasizes eating foods like fish, fruits, vegetables, beans, high-fiber breads and whole grains, nuts, and olive oil. This style of eating includes limited red meat, cheese, and sweets. Why choose the Mediterranean diet? A Mediterranean-style diet may improve heart health. It contains more fat than other heart-healthy diets. But the fats are mainly from nuts, unsaturated oils (such as fish oils and olive oil), and certain nut or seed oils (such as canola, soybean, or flaxseed oil).  These fats may help protect the heart and blood vessels. How can you get started on the Mediterranean diet? Here are some things you can do to switch to a more Mediterranean way of eating. What to eat · Eat a variety of fruits and vegetables each day, such as grapes, blueberries, tomatoes, broccoli, peppers, figs, olives, spinach, eggplant, beans, lentils, and chickpeas. · Eat a variety of whole-grain foods each day, such as oats, brown rice, and whole wheat bread, pasta, and couscous. · Eat fish at least 2 times a week. Try tuna, salmon, mackerel, lake trout, herring, or sardines. · Eat moderate amounts of low-fat dairy products, such as milk, cheese, or yogurt. · Eat moderate amounts of poultry and eggs. · Choose healthy (unsaturated) fats, such as nuts, olive oil, and certain nut or seed oils like canola, soybean, and flaxseed. · Limit unhealthy (saturated) fats, such as butter, palm oil, and coconut oil. And limit fats found in animal products, such as meat and dairy products made with whole milk. Try to eat red meat only a few times a month in very small amounts. · Limit sweets and desserts to only a few times a week. This includes sugar-sweetened drinks like soda. The Mediterranean diet may also include red wine with your meal1 glass each day for women and up to 2 glasses a day for men. Tips for eating at home · Use herbs, spices, garlic, lemon zest, and citrus juice instead of salt to add flavor to foods. · Add avocado slices to your sandwich instead of nuñez. · Have fish for lunch or dinner instead of red meat. Brush the fish with olive oil, and broil or grill it. · Sprinkle your salad with seeds or nuts instead of cheese. · Cook with olive or canola oil instead of butter or oils that are high in saturated fat. · Switch from 2% milk or whole milk to 1% or fat-free milk.  
· Dip raw vegetables in a vinaigrette dressing or hummus instead of dips made from mayonnaise or sour cream. 
 · Have a piece of fruit for dessert instead of a piece of cake. Try baked apples, or have some dried fruit. Tips for eating out · Try broiled, grilled, baked, or poached fish instead of having it fried or breaded. · Ask your  to have your meals prepared with olive oil instead of butter. · Order dishes made with marinara sauce or sauces made from olive oil. Avoid sauces made from cream or mayonnaise. · Choose whole-grain breads, whole wheat pasta and pizza crust, brown rice, beans, and lentils. · Cut back on butter or margarine on bread. Instead, you can dip your bread in a small amount of olive oil. · Ask for a side salad or grilled vegetables instead of french fries or chips. Where can you learn more? Go to http://jean carlosGold Standard Diagnosticsrufus.info/. Enter [phone] in the search box to learn more about \"Learning About the Mediterranean Diet. \" Current as of: March 28, 2018 Content Version: 11.9 © 3282-1534 [employer]. Care instructions adapted under license by [employer] (which disclaims liability or warranty for this information). If you have questions about a medical condition or this instruction, always ask your healthcare professional. [address] any warranty or liability for your use of this information. Starting a Weight Loss Plan: Care Instructions Your Care Instructions If you are thinking about losing weight, it can be hard to know where to start. Your doctor can help you set up a weight loss plan that best meets your needs. You may want to take a class on nutrition or exercise, or join a weight loss support group. If you have questions about how to make changes to your eating or exercise habits, ask your doctor about seeing a registered dietitian or an exercise specialist. 
It can be a big challenge to lose weight. But you do not have to make huge changes at once. Make small changes, and stick with them.  When those changes become habit, add a few more changes. If you do not think you are ready to make changes right now, try to pick a date in the future. Make an appointment to see your doctor to discuss whether the time is right for you to start a plan. Follow-up care is a key part of your treatment and safety. Be sure to make and go to all appointments, and call your doctor if you are having problems. It's also a good idea to know your test results and keep a list of the medicines you take. How can you care for yourself at home? · Set realistic goals. Many people expect to lose much more weight than is likely. A weight loss of 5% to 10% of your body weight may be enough to improve your health. · Get family and friends involved to provide support. Talk to them about why you are trying to lose weight, and ask them to help. They can help by participating in exercise and having meals with you, even if they may be eating something different. · Find what works best for you. If you do not have time or do not like to cook, a program that offers meal replacement bars or shakes may be better for you. Or if you like to prepare meals, finding a plan that includes daily menus and recipes may be best. 
· Ask your doctor about other health professionals who can help you achieve your weight loss goals. ? A dietitian can help you make healthy changes in your diet. ? An exercise specialist or  can help you develop a safe and effective exercise program. 
? A counselor or psychiatrist can help you cope with issues such as depression, anxiety, or family problems that can make it hard to focus on weight loss. · Consider joining a support group for people who are trying to lose weight. Your doctor can suggest groups in your area. Where can you learn more? Go to http://jean carlos-rufus.info/. Enter P551 in the search box to learn more about \"Starting a Weight Loss Plan: Care Instructions. \" 
 Current as of: June 25, 2018 Content Version: 11.9 © 9060-7763 [employer]. Care instructions adapted under license by [employer] (which disclaims liability or warranty for this information). If you have questions about a medical condition or this instruction, always ask your healthcare professional. [address] any warranty or liability for your use of this information.

## 2019-04-17 ENCOUNTER — OFFICE VISIT (OUTPATIENT)
Dept: INTERNAL MEDICINE CLINIC | Age: 59
End: 2019-04-17

## 2019-04-17 VITALS
SYSTOLIC BLOOD PRESSURE: 138 MMHG | DIASTOLIC BLOOD PRESSURE: 89 MMHG | HEART RATE: 75 BPM | RESPIRATION RATE: 16 BRPM | TEMPERATURE: 98.1 F | WEIGHT: 184.7 LBS | OXYGEN SATURATION: 99 % | HEIGHT: 64 IN | BODY MASS INDEX: 31.53 KG/M2

## 2019-04-17 DIAGNOSIS — G89.29 CHRONIC PAIN OF RIGHT KNEE: Primary | ICD-10-CM

## 2019-04-17 DIAGNOSIS — M25.561 CHRONIC PAIN OF RIGHT KNEE: Primary | ICD-10-CM

## 2019-04-17 RX ORDER — TRIAMCINOLONE ACETONIDE 40 MG/ML
40 INJECTION, SUSPENSION INTRA-ARTICULAR; INTRAMUSCULAR ONCE
Qty: 1 ML | Refills: 0
Start: 2019-04-17 | End: 2019-04-17

## 2019-04-17 NOTE — PROGRESS NOTES
Chief Complaint Patient presents with  Knee Pain  
 
she is a 59y.o. year old female who presents for evaluation of right knee Pain Assessment Encounter Petros Combs 4/17/2019 Onset of Symptoms: 1 year 
________________________________________________________________________ Description: Patient states that it is a throbbing ache Frequency: more than 5 times a day Pain Scale:(1-10): 7 Trauma Hx: none Hx of similar symptoms: Yes Radiation: YES, foot, ankle, shin, leg and low back Duration:  continuous Progression: has worsened What makes it better?: OTC meds and rest 
What makes it worse?:sitting, sleep and walking Medications tried: acetaminophen, ibuprofen Reviewed and agree with Nurse Note and duplicated in this note. Reviewed PmHx, RxHx, FmHx, SocHx, AllgHx and updated and dated in the chart. Family History Problem Relation Age of Onset  Hypertension Mother  Hypertension Father  Hypertension Sister  Hypertension Brother  Hypertension Sister  Hypertension Sister  Hypertension Sister Past Medical History:  
Diagnosis Date  Diverticulosis of large intestine with hemorrhage 02/14/2014  
 distal sigmoid colon, mid-sigmoid colon, descending colon - colonoscopy 11/9/16  Hyperlipemia  5/2016; not on medication  Hypertension  Hypovitaminosis D 04/27/2015  
 level normal 5/2016  Microscopic hematuria 4/27/2015  Psychiatric disorder   
 anxiety Social History Socioeconomic History  Marital status:  Spouse name: Not on file  Number of children: Not on file  Years of education: Not on file  Highest education level: Not on file Occupational History  Occupation: caregiver Tobacco Use  Smoking status: Never Smoker  Smokeless tobacco: Never Used Substance and Sexual Activity  Alcohol use: Yes Alcohol/week: 1.2 oz Types: 2 Glasses of wine per week Comment: occassionally:  Drug use: Yes Types: Marijuana Comment: last used in December 2016 - per pt 2/1/17  Sexual activity: Yes  
  Partners: Male Birth control/protection: Surgical  
  
 
Review of Systems - negative except as listed above Objective:  
 
Vitals:  
 04/17/19 1107 BP: 138/89 Pulse: 75 Resp: 16 Temp: 98.1 °F (36.7 °C) TempSrc: Oral  
SpO2: 99% Weight: 184 lb 11.2 oz (83.8 kg) Height: 5' 4\" (1.626 m) Physical Examination: General appearance - alert, well appearing, and in no distress Back exam - full range of motion, no tenderness, palpable spasm or pain on motion Neurological - alert, oriented, normal speech, no focal findings or movement disorder noted Musculoskeletal - right knee exam: 
The patient'sright Knee  is  normal to inspection. The ROM is normal and there is flexion to Normal Effusion is: mild The joint exhibits Negative warmth and Crepitus is: mild. The Izzy test is:  positive Joint Line Tenderness is  positive medial.  The Thessaly Test is not tested  and the Lachman is  negative. The Anterior Drawer is negative. The Posterior Drawer is:  negative. Valgus Stress (for MCL) is:  normal . Varus Stress (for LCL) is  normal  . The Ann Test is negative and the Apprehension Sign:  negative Patellar Grind negative Extremities - peripheral pulses normal, no pedal edema, no clubbing or cyanosis Skin - normal coloration and turgor, no rashes, no suspicious skin lesions noted Time Out taken at: 
11:18 AM 
4/17/2019 * Patient was identified by name and date of birth * Agreement on procedure being performed was verified * Risks and Benefits explained to the patient * Procedure site verified and marked as necessary * Patient was positioned for comfort * Consent was signed and verified In the presence of: Witness: Mariaelena Wright LPN Injection #: 1 Needle:  18 gauge Procedure: This procedure was discussed with Migdalia Hook and other therapeutic options were considered (risks vs benefits). Migdalia Hook and I thought that an injection was merited. After informed consent was obtained, landmarks were identified(marked), and the right joint  was cleansed with ChlorPrep in the standard sterile manner. 3mL  1% lidocaine  and  1mL Kenalog  was then injected and needle tenotomy was not performed. Procedure performed with ultrasound needle guidance. The needle was then withdrawn. T he procedure was well tolerated. The patient is asked to continue to rest the area for a few more days before resuming regular activities. It may be more painful for the first 1-2 days. NSAIDS are to be avoided. Watch for fever, or increased swelling or persistent pain in the joint. Call or return to clinic prn if such symptoms occur or there is failure to improve as anticipated. The procedure did provide relief of symptoms in the clinic. RTC in 4 weeks for reevaluation and possible reinjection. Given the patient's body habitus and the anatomically deep nature of this structure, sonographic guidance is recommended to prevent injury to neurovascular structures and confirm accuracy of injection. Furthermore, this patient has failed conservative treatment with physical therapy and modalities and the diagnostic and therapeutic accuracy is important. Assessment/ Plan:  
Diagnoses and all orders for this visit: 
 
1. Chronic pain of right knee -     XR KNEE RT MIN 4 V; Future -     TRIAMCINOLONE ACETONIDE INJ 
-     triamcinolone acetonide (KENALOG) 40 mg/mL injection; 1 mL by Intra artICUlar route once for 1 dose. -     SD ARTHROCENTESIS ASPIR&/INJ INTERM JT/BURS W/US Pathophysiology, recovery and rehabilitation process discussed and questions answered Counseling for 30 Minutes of the total visit duration Pictures and figures used as necessary Provided reassurance Monitor response to injection Discussed steroid side effects of fat atrophy, hypopigmentation, steroid flare or infection Monitor response to home exercises Recommend activity modification Recommend  lower impact activities-walking, Eliptical, [employer], cycling or swimming Follow up in 4 week(s) 1) Remember to stay active and/or exercise regularly (I suggest 30-45 minutes daily) 2) For reliable dietary information, go to www. EATRIGHT.org. You may wish to consider seeing the nutritionist at [facility] [phone], also consider the [address] I have discussed the diagnosis with the patient and the intended plan as seen in the above orders. The patient has received an after-visit summary and questions were answered concerning future plans. Medication Side Effects and Warnings were discussed with patient, Patient Labs were reviewed and or requested, and 
Patient Past Records were reviewed and or requested  yes Pt agrees to call or return to clinic and/or go to closest ER with any worsening of symptoms. This may include, but not limited to increased fever (>100.4) with NSAIDS or Tylenol, increased edema, confusion, rash, worsening of presenting symptoms.

## 2019-04-26 ENCOUNTER — HOSPITAL ENCOUNTER (OUTPATIENT)
Dept: PHYSICAL THERAPY | Age: 59
Discharge: HOME | End: 2019-04-26
Payer: SELF-PAY

## 2019-04-26 PROCEDURE — 97110 THERAPEUTIC EXERCISES: CPT | Performed by: PHYSICAL THERAPIST

## 2019-04-26 PROCEDURE — 97161 PT EVAL LOW COMPLEX 20 MIN: CPT | Performed by: PHYSICAL THERAPIST

## 2019-04-26 NOTE — PROGRESS NOTES
[employer] [facility], [address] Phone: [phone]  Fax: [phone] Plan of Care/Statement of Necessity for Physical Therapy Services  2-15 Patient name: Shruthi Trejo  : 1960  Provider#: [id number] Referral source: Nerissa Snellen, NP Medical/Treatment Diagnosis: Right knee pain [M25.561] Prior Hospitalization: see medical history Comorbidities: OA 
Prior Level of Function: complete 20 minutes of exercise seldom or never Medications: Verified on Patient Summary List 
 
Start of Care: 2019      Onset Date: 1 year ago The Plan of Care and following information is based on the information from the initial evaluation. Assessment/ galindo information: 59 y.o. Female with right knee pain secondary to knee OA and associated muscle guarding and stiffness. Evaluation Complexity History MEDIUM  Complexity : 1-2 comorbidities / personal factors will impact the outcome/ POC ; Examination HIGH Complexity : 4+ Standardized tests and measures addressing body structure, function, activity limitation and / or participation in recreation  ;Presentation LOW Complexity : Stable, uncomplicated  ;Clinical Decision Making MEDIUM Complexity : FOTO score of 26-74 Overall Complexity Rating: LOW Problem List: pain affecting function, decrease ROM, decrease strength, impaired gait/ balance, decrease ADL/ functional abilitiies, decrease activity tolerance and decrease flexibility/ joint mobility Treatment Plan may include any combination of the following: Therapeutic exercise, Therapeutic activities, Neuromuscular re-education, Physical agent/modality, Gait/balance training, Manual therapy, Patient education and Self Care training Patient / Family readiness to learn indicated by: asking questions and trying to perform skills Persons(s) to be included in education: patient (P) Barriers to Learning/Limitations: None Patient Goal (s): reduce pain Patient Self Reported Health Status: good Rehabilitation Potential: good Long Term Goals: To be accomplished in 4-8 weeks: 
1) Pt will be able to Navigate one flight of stairs without pain. 2) Pt will be able to Stand for >/= 30 minutes without pain. 3) Pt will be able to Sit for >/= 60 minutes without pain. 4) Pt will be able to Ambulate >/= 0.5 miles without pain. 5) Pt will be able to squat to retrieve item from ground without increase of pain. 6) Pt will be independent with HEP Frequency / Duration: Patient to be seen 2 times per week for 4-8 weeks. Patient/ Caregiver education and instruction: activity modification and exercises 
 
[x]  Plan of care has been reviewed with ALLI Lobo, PT, DPT 4/26/2019  
 
________________________________________________________________________ I certify that the above Therapy Services are being furnished while the patient is under my care. I agree with the treatment plan and certify that this therapy is necessary. [de-identified] Signature:____________________  Date:____________Time: _________

## 2019-04-26 NOTE — PROGRESS NOTES
PT INITIAL EVALUATION NOTE 2-15 Patient Name: Pavithra Adams Date:2019 : 1960 [x]  Patient  Verified Payor: SELF PAY / Plan: BS\[facility] [place] [facility]\"" SELF PAY / Product Type: Self Pay / In [address] Total Treatment Time (min): 55 Visit #: 1 Treatment Area: Right knee pain [M25.561] SUBJECTIVE Pain Level (0-10 scale): 6/10 Any medication changes, allergies to medications, adverse drug reactions, diagnosis change, or new procedure performed?: [] No    [x] Yes (see summary sheet for update) Subjective:    
Pt reports slipping on some gum balls in her drive way approx 1 year ago rolling her right ankle and twisting her right knee. Her ankle is doing better but she is still having right knee pain. Right knee pain is worse with walking >/= 1 block, getting up off the ground. She does also report the knee giving way on her with walking. She bowls 1 time per week and has modified her technique to avoid pain. OBJECTIVE/EXAMINATION Posture:  Flexed right knee in standing, increased edema at right knee Other Observations:  -- 
Gait and Functional Mobility:  Standing with weight shifted to left LE, squatts with minimal weaing in right LE flexion 15 degrees. Palpation: tenderness right knee medial joint line patella region Right Knee AROM 0/2/95 PROM 0/2/100 Left Knee AROM 4/0/130 PROM 6/0/140 Joint Mobility Assessment: patella hypomobility, tibiofemoral A-P and P-A hypomobility Flexibility: quad, hamstring and gastroc/soleus LOWER QUARTER   MUSCLE STRENGTH 
KEY       R  L 
0 - No Contraction  Knee ext  >3 P!  5 
1 - Trace            flex  >3 P!  5 
2 - Poor   Hip ext   >3 P!  5 
3 - Fair          flex   >3 P!  5 
4 - Good         abd  >3 P!  5 
5 - Normal         add  >3 P!  5 Ankle DF  5  5 
              PF  5  5 INV  5  5 EV  5  5 Neurological: Reflexes / Sensations: normal 
 Special Tests: Knee Varus/Valgus Stress: negative 15 min Therapeutic Exercise:  [x] See flow sheet : demonstration and preparation of HEP Rationale: increase ROM, increase strength, improve coordination, improve balance and increase proprioception to improve the patients ability to ambulate and squat to ground With 
 [x] TE 
 [] TA 
 [] neuro 
 [] other: Patient Education: [x] Review HEP [] Progressed/Changed HEP based on:  
[] positioning   [] body mechanics   [] transfers   [] heat/ice application   
[] other:   
 
 
Other Objective/Functional Measures: FOTO Functional Measure: 32/100 Pain Level (0-10 scale) post treatment: 6/10 ASSESSMENT:  
  
[x]  See Plan of Care Jamal Rodrigues PT, DPT 4/26/2019

## 2019-04-30 ENCOUNTER — HOSPITAL ENCOUNTER (OUTPATIENT)
Dept: PHYSICAL THERAPY | Age: 59
Discharge: HOME | End: 2019-04-30
Payer: SELF-PAY

## 2019-04-30 PROCEDURE — 97110 THERAPEUTIC EXERCISES: CPT | Performed by: PHYSICAL THERAPIST

## 2019-04-30 NOTE — PROGRESS NOTES
PT DAILY TREATMENT NOTE - [facility] 2-15 Patient Name: Yayo Bullock Date:2019 : 1960 [x]  Patient  Verified Payor: SELF PAY / Plan: [facility] SELF PAY / Product Type: Self Pay / In time:200p  Out time:238p Total Treatment Time (min): 38 Total Timed Codes (min): 38 
1:1 Treatment Time ( [address] only): -- Visit #:  2 Treatment Area: Right knee pain [M25.561] SUBJECTIVE Pain Level (0-10 scale): 6/10 Any medication changes, allergies to medications, adverse drug reactions, diagnosis change, or new procedure performed?: [x] No    [] Yes (see summary sheet for update) Subjective functional status/changes:   [] No changes reported Pt states that the stretching are helping. OBJECTIVE 38 min Therapeutic Exercise:  [x] See flow sheet :  
Rationale: increase ROM, increase strength, improve coordination, improve balance and increase proprioception to improve the patients ability to ambulate and squat With 
 [x] TE 
 [] TA 
 [] neuro 
 [] other: Patient Education: [x] Review HEP [] Progressed/Changed HEP based on:  
[] positioning   [] body mechanics   [] transfers   [] heat/ice application   
[] other:   
 
Other Objective/Functional Measures: --  
 
Pain Level (0-10 scale) post treatment: 5/10 ASSESSMENT/Changes in Function:  
Pt reports that she is fatigued after therex today. Will consider introducing passive knee fleixn stretching next session. Patient will continue to benefit from skilled PT services to modify and progress therapeutic interventions, address functional mobility deficits, address ROM deficits, address strength deficits, analyze and address soft tissue restrictions, analyze and cue movement patterns, analyze and modify body mechanics/ergonomics and assess and modify postural abnormalities to attain remaining goals. []  See Plan of Care 
[]  See progress note/recertification 
[]  See Discharge Summary Progress towards goals / Updated goals: Long Term Goals: To be accomplished in 4-8 weeks: 
1) Pt will be able to Navigate one flight of stairs without pain. 2) Pt will be able to Stand for >/= 30 minutes without pain. 3) Pt will be able to Sit for >/= 60 minutes without pain. 4) Pt will be able to Ambulate >/= 0.5 miles without pain. 5) Pt will be able to squat to retrieve item from ground without increase of pain. 6) Pt will be independent with HEP 
 
 
 
PLAN [x]  Upgrade activities as tolerated     [x]  Continue plan of care 
[]  Update interventions per flow sheet      
[]  Discharge due to:_ 
[]  Other:_   
 
Elizabeth Huynh, PT, DPT 4/30/2019

## 2019-04-30 NOTE — PROGRESS NOTES
[address] [place] [address] Physical Therapy [address] [address], [address] Phone: [phone]  Fax: [phone] Plan of Care/Statement of Necessity for Physical Therapy Services  2-15 Patient name: Paras Delgado  : 1960  Provider#: [id number] Referral source: Shantell Reyes NP Medical/Treatment Diagnosis: Right knee pain [M25.561] Prior Hospitalization: see medical history Comorbidities: knee OA Prior Level of Function: complete 20 minutes of exercise seldom or never Medications: Verified on Patient Summary List 
 
Start of Care: 2019      Onset Date: 2018 The Plan of Care and following information is based on the information from the initial evaluation. Assessment/ galindo information: 59 y.o. Female with right knee OA and associated loss of ROM and weakness. Evaluation Complexity History MEDIUM  Complexity : 1-2 comorbidities / personal factors will impact the outcome/ POC ; Examination HIGH Complexity : 4+ Standardized tests and measures addressing body structure, function, activity limitation and / or participation in recreation  ;Presentation LOW Complexity : Stable, uncomplicated  ;Clinical Decision Making MEDIUM Complexity : FOTO score of 26-74 Overall Complexity Rating: LOW Problem List: pain affecting function, decrease ROM, decrease strength, impaired gait/ balance, decrease ADL/ functional abilitiies, decrease activity tolerance and decrease flexibility/ joint mobility Treatment Plan may include any combination of the following: Therapeutic exercise, Therapeutic activities, Neuromuscular re-education, Physical agent/modality, Gait/balance training, Manual therapy, Patient education and Self Care training Patient / Family readiness to learn indicated by: asking questions and trying to perform skills Persons(s) to be included in education: patient (P) Barriers to Learning/Limitations: None Patient Goal (s): reduce pain Patient Self Reported Health Status: good Rehabilitation Potential: good Long Term Goals: To be accomplished in 4-6 weeks: 
1) Pt will be able to Navigate one flight of stairs without pain. 2) Pt will be able to Stand for >/= 30 minutes without pain. 3) Pt will be able to Sit for >/= 45 minutes without pain. 4) Pt will be able to Ambulate >/= 1 miles without pain. 5) Pt will be able to squat to retrieve item from ground without increase of pain. 6) Pt will be independent with HEP Frequency / Duration: Patient to be seen 2 times per week for 4-6 weeks. Patient/ Caregiver education and instruction: activity modification and exercises 
 
[x]  Plan of care has been reviewed with ALLI Ruiz, PT, DPT 4/30/2019  
 
________________________________________________________________________ I certify that the above Therapy Services are being furnished while the patient is under my care. I agree with the treatment plan and certify that this therapy is necessary. [de-identified] Signature:____________________  Date:____________Time: _________

## 2019-05-02 ENCOUNTER — HOSPITAL ENCOUNTER (OUTPATIENT)
Dept: PHYSICAL THERAPY | Age: 59
Discharge: HOME | End: 2019-05-02
Payer: SELF-PAY

## 2019-05-02 PROCEDURE — 97110 THERAPEUTIC EXERCISES: CPT | Performed by: PHYSICAL THERAPIST

## 2019-05-02 NOTE — PROGRESS NOTES
PT DAILY TREATMENT NOTE - [facility] 2-15 Patient Name: Lindsey Mccracken Date:2019 : 1960 [x]  Patient  Verified Payor: SELF PAY / Plan: [facility] SELF PAY / Product Type: Self Pay / In time:930a  Out time:1040a Total Treatment Time (min): 70 Total Timed Codes (min): 60 
1:1 Treatment Time ( [address] only): -- Visit #:  3 Treatment Area: Right knee pain [M25.561] SUBJECTIVE Pain Level (0-10 scale): 5/10 Any medication changes, allergies to medications, adverse drug reactions, diagnosis change, or new procedure performed?: [x] No    [] Yes (see summary sheet for update) Subjective functional status/changes:   [] No changes reported Pt reports that her right hip and knee are sore today from bowling last night. OBJECTIVE Modality rationale: decrease pain to improve the patients ability to ambulate Min Type Additional Details  
  
 [] Estim: []Att   []Unatt    []TENS instruct []IFC  []Premod   []NMES []Other:  []w/US   []w/ice   []w/heat Position: Location:  
  
 []  Traction: [] Cervical       []Lumbar 
                     [] Prone          []Supine []Intermittent   []Continuous Lbs: 
[] before manual 
[] after manual 
[]w/heat  
 []  Ultrasound: []Continuous   [] Pulsed  
                    at: []1MHz   []3MHz Location: 
W/cm2:  
 [] Paraffin Location:  
[]w/heat  
10 []  Ice     [x]  Heat 
[]  Ice massage Position: seated Location:knee  
 []  Laser 
[]  Other: Position: Location:  
 
 []  Vasopneumatic Device Pressure:       [] lo [] med [] hi  
Temperature:   
 
[x] Skin assessment post-treatment:  [x]intact []redness- no adverse reaction 
  []redness  adverse reaction:  
 
60 min Therapeutic Exercise:  [x] See flow sheet : prone knee flexion stretching Rationale: increase ROM, increase strength, improve coordination, improve balance and increase proprioception to improve the patients ability to ambulate and squat 
  
                                                              
With 
 [x] TE 
 [] TA 
 [] neuro 
 [] other: Patient Education: [x] Review HEP [] Progressed/Changed HEP based on:  
[] positioning   [] body mechanics   [] transfers   [] heat/ice application   
[] other:   
  
Other Objective/Functional Measures: --prone right knee flexion 110    
  
Pain Level (0-10 scale) post treatment: 4/10 
  
ASSESSMENT/Changes in Function:  
Advanced with therex and prone knee flexion stretching today. Prone knee flexion limited by quad stiffness vs knee pain at end range. Patient will continue to benefit from skilled PT services to modify and progress therapeutic interventions, address functional mobility deficits, address ROM deficits, address strength deficits, analyze and address soft tissue restrictions, analyze and cue movement patterns, analyze and modify body mechanics/ergonomics and assess and modify postural abnormalities to attain remaining goals. []  See Plan of Care 
[]  See progress note/recertification 
[]  See Discharge Summary Progress towards goals / Updated goals: 
Long Term Goals: To be accomplished in 4-8 weeks: 
1) Pt will be able to Navigate one flight of stairs without pain. 2) Pt will be able to Stand for >/= 30 minutes without pain.    
3) Pt will be able to Sit for >/= 60 minutes without pain. 4) Pt will be able to Ambulate >/= 0.5 miles without pain. 5) Pt will be able to squat to retrieve item from ground without increase of pain. 6) Pt will be independent with HEP 
  
  
  
PLAN [x]  Upgrade activities as tolerated     [x]  Continue plan of care 
[]  Update interventions per flow sheet      
[]  Discharge due to:_ 
[]  Other:_   
 
 
 
Rosalio Parmar, PT, DPT 5/2/2019

## 2019-05-07 ENCOUNTER — HOSPITAL ENCOUNTER (OUTPATIENT)
Dept: PHYSICAL THERAPY | Age: 59
Discharge: HOME | End: 2019-05-07
Payer: SELF-PAY

## 2019-05-07 PROCEDURE — 97110 THERAPEUTIC EXERCISES: CPT | Performed by: PHYSICAL THERAPIST

## 2019-05-07 NOTE — PROGRESS NOTES
PT DAILY TREATMENT NOTE - [facility] 2-15 Patient Name: Pavithra Gresham Date:2019 : 1960 [x]  Patient  Verified Payor: SELF PAY / Plan: [facility] SELF PAY / Product Type: Self Pay / In [address] Total Treatment Time (min): 55 Total Timed Codes (min): 55 
1:1 Treatment Time ( [address] only): -- Visit #:  4 Treatment Area: Right knee pain [M25.561] SUBJECTIVE Pain Level (0-10 scale): 5/10 Any medication changes, allergies to medications, adverse drug reactions, diagnosis change, or new procedure performed?: [x] No    [] Yes (see summary sheet for update) Subjective functional status/changes:   [] No changes reported Pt states that she is not any worse after each session and that she is noticing improvement in leg strength. OBJECTIVE 
 
  
55 min Therapeutic Exercise:  [x] See flow sheet : passive knee extension stretching Rationale: increase ROM, increase strength, improve coordination, improve balance and increase proprioception to improve the patients ability to ambulate and squat 
  
  
With 
 [x] TE 
 [] TA 
 [] neuro 
 [] other: Patient Education: [x] Review HEP   
[] Progressed/Changed HEP based on:  
[] positioning   [] body mechanics   [] transfers   [] heat/ice application   
[] other:   
  
Other Objective/Functional Measures: --- 
  
Pain Level (0-10 scale) post treatment: 4/10 
  
ASSESSMENT/Changes in Function:  
Pushed end range extension within tolerance. Advanced to [facility] today without increase of pain. Patient will continue to benefit from skilled PT services to modify and progress therapeutic interventions, address functional mobility deficits, address ROM deficits, address strength deficits, analyze and address soft tissue restrictions, analyze and cue movement patterns, analyze and modify body mechanics/ergonomics and assess and modify postural abnormalities to attain remaining goals. 
  
[]  See Plan of Care 
[]  See progress note/recertification []  See Discharge Summary 
  
Progress towards goals / Updated goals: 
Long Term Goals: To be accomplished in 4-8 weeks: 
1) Pt will be able to Navigate one flight of stairs without pain. 2) Pt will be able to Stand for >/= 30 minutes without pain.   Progression 3) Pt will be able to Sit for >/= 60 minutes without pain. 4) Pt will be able to Ambulate >/= 0.5 miles without pain. 5) Pt will be able to squat to retrieve item from ground without increase of pain. 6) Pt will be independent with HEP 
  
  
  
PLAN [x]  Upgrade activities as tolerated     [x]  Continue plan of care 
[]  Update interventions per flow sheet      
[]  Discharge due to:_ 
[]  Other:_   
  
 
 
Ed Reyes, PT, DPT 5/7/2019

## 2019-05-09 ENCOUNTER — HOSPITAL ENCOUNTER (OUTPATIENT)
Dept: PHYSICAL THERAPY | Age: 59
Discharge: HOME | End: 2019-05-09
Payer: SELF-PAY

## 2019-05-09 PROCEDURE — 97110 THERAPEUTIC EXERCISES: CPT

## 2019-05-09 NOTE — PROGRESS NOTES
PT DAILY TREATMENT NOTE - [facility] 2-15 Patient Name: Swetha Campbell Date:2019 : 1960 [x]  Patient  Verified Payor: SELF PAY / Plan: [facility] SELF PAY / Product Type: Self Pay / In time:10:35A  Out time: 11:35P Total Treatment Time (min): 60 Total Timed Codes (min): 60 
1:1 Treatment Time ( [address] only): -- Visit #:  7 Treatment Area: Right knee pain [M25.561] SUBJECTIVE Pain Level (0-10 scale): 6/10 Any medication changes, allergies to medications, adverse drug reactions, diagnosis change, or new procedure performed?: [x] No    [] Yes (see summary sheet for update) Subjective functional status/changes:   [] No changes reported Pt reported walking 4 blocks yesterday which is more than she usually does. Did not do her stretches or ice afterward. Woke up this morning barely able to walk. Took an epsom salt bath which made a big difference. OBJECTIVE 
 
  
60 min Therapeutic Exercise:  [x] See flow sheet : passive knee extension stretching Rationale: increase ROM, increase strength, improve coordination, improve balance and increase proprioception to improve the patients ability to ambulate and squat 
  
  
With 
 [x] TE 
 [] TA 
 [] neuro 
 [] other: Patient Education: [x] Review HEP   
[] Progressed/Changed HEP based on:  
[] positioning   [] body mechanics   [] transfers   [] heat/ice application   
[] other:   
  
Other Objective/Functional Measures: --- 
  
Pain Level (0-10 scale) post treatment: 5/10 
  
ASSESSMENT/Changes in Function:  
Added SLS pt tolerated well. Reported less pain following manual stretching today. Pt encouraged to stretch and use ice at home.    
Patient will continue to benefit from skilled PT services to modify and progress therapeutic interventions, address functional mobility deficits, address ROM deficits, address strength deficits, analyze and address soft tissue restrictions, analyze and cue movement patterns, analyze and modify body mechanics/ergonomics and assess and modify postural abnormalities to attain remaining goals. 
  
[x]  See Plan of Care 
[]  See progress note/recertification 
[]  See Discharge Summary 
  
Progress towards goals / Updated goals: 
Long Term Goals: To be accomplished in 4-8 weeks: 
1) Pt will be able to Navigate one flight of stairs without pain. 2) Pt will be able to Stand for >/= 30 minutes without pain.   Progression 3) Pt will be able to Sit for >/= 60 minutes without pain. 4) Pt will be able to Ambulate >/= 0.5 miles without pain. 5) Pt will be able to squat to retrieve item from ground without increase of pain. 6) Pt will be independent with HEP 
  
  
  
PLAN [x]  Upgrade activities as tolerated     [x]  Continue plan of care 
[]  Update interventions per flow sheet      
[]  Discharge due to:_ 
[]  Other:_   
  
 
 
Arden Jauregui PTA, OPTA 5/9/2019

## 2019-05-14 ENCOUNTER — HOSPITAL ENCOUNTER (OUTPATIENT)
Dept: PHYSICAL THERAPY | Age: 59
Discharge: HOME | End: 2019-05-14
Payer: SELF-PAY

## 2019-05-14 PROCEDURE — 97110 THERAPEUTIC EXERCISES: CPT

## 2019-05-14 NOTE — PROGRESS NOTES
PT DAILY TREATMENT NOTE - [facility] 2-15 Patient Name: Fannie Barreto Date:2019 : 1960 [x]  Patient  Verified Payor: SELF PAY [facility] / Product Type: Self Pay / In time:9:30A  Out time: 10:30A Total Treatment Time (min): 60 Total Timed Codes (min): 60 
1:1 Treatment Time ( [address] only): -- Visit #:  6 Treatment Area: Right knee pain [M25.561] SUBJECTIVE Pain Level (0-10 scale): 6/10 Any medication changes, allergies to medications, adverse drug reactions, diagnosis change, or new procedure performed?: [x] No    [] Yes (see summary sheet for update) Subjective functional status/changes:   [] No changes reported Pt had an active weekend because of mothers day. He knee held up well and she was able to wear a small heel to [employer]. OBJECTIVE 
 
  
60 min Therapeutic Exercise:  [x] See flow sheet : passive knee extension stretching Rationale: increase ROM, increase strength, improve coordination, improve balance and increase proprioception to improve the patients ability to ambulate and squat 
  
  
With 
 [x] TE 
 [] TA 
 [] neuro 
 [] other: Patient Education: [x] Review HEP   
[] Progressed/Changed HEP based on:  
[] positioning   [] body mechanics   [] transfers   [] heat/ice application   
[] other:   
  
Other Objective/Functional Measures: --- 
  
Pain Level (0-10 scale) post treatment: 0/10 
  
ASSESSMENT/Changes in Function:  
Pt tolerated session well. Reported increase fatigue with seated HS curls at the cable column. Patient will continue to benefit from skilled PT services to modify and progress therapeutic interventions, address functional mobility deficits, address ROM deficits, address strength deficits, analyze and address soft tissue restrictions, analyze and cue movement patterns, analyze and modify body mechanics/ergonomics and assess and modify postural abnormalities to attain remaining goals. 
  
[x]  See Plan of Care []  See progress note/recertification 
[]  See Discharge Summary 
  
Progress towards goals / Updated goals: 
Long Term Goals: To be accomplished in 4-8 weeks: 
1) Pt will be able to Navigate one flight of stairs without pain. 2) Pt will be able to Stand for >/= 30 minutes without pain.   Progression 3) Pt will be able to Sit for >/= 60 minutes without pain. 4) Pt will be able to Ambulate >/= 0.5 miles without pain. 5) Pt will be able to squat to retrieve item from ground without increase of pain. 6) Pt will be independent with HEP 
  
  
  
PLAN [x]  Upgrade activities as tolerated     [x]  Continue plan of care 
[]  Update interventions per flow sheet      
[]  Discharge due to:_ 
[]  Other:_   
  
 
 
Niels Avery PTA, OPTA 5/14/2019

## 2019-05-16 ENCOUNTER — APPOINTMENT (OUTPATIENT)
Dept: PHYSICAL THERAPY | Age: 59
End: 2019-05-16
Payer: SELF-PAY

## 2019-05-21 ENCOUNTER — HOSPITAL ENCOUNTER (OUTPATIENT)
Dept: PHYSICAL THERAPY | Age: 59
Discharge: HOME | End: 2019-05-21
Payer: SELF-PAY

## 2019-05-21 PROCEDURE — 97110 THERAPEUTIC EXERCISES: CPT | Performed by: PHYSICAL THERAPIST

## 2019-05-21 PROCEDURE — 97530 THERAPEUTIC ACTIVITIES: CPT | Performed by: PHYSICAL THERAPIST

## 2019-05-21 NOTE — PROGRESS NOTES
PT DAILY TREATMENT NOTE - [facility] 2-15    Patient Name: Mary Pineda  Date:2019  : 1960  [x]  Patient  Verified  Payor: SELF PAY / Plan: [facility] SELF PAY / Product Type: Self Pay /    In time:940a   Out time: 1040a  Total Treatment Time (min): 60  Total Timed Codes (min): 60  1:1 Treatment Time ( only): --   Visit #:  7    Treatment Area: Right knee pain [M25.561]    SUBJECTIVE  Pain Level (0-10 scale): 5/10  Any medication changes, allergies to medications, adverse drug reactions, diagnosis change, or new procedure performed?: [x] No    [] Yes (see summary sheet for update)  Subjective functional status/changes:   [] No changes reported  Pt states that she went bowling last night and did have some pain during and after. Today she is a little sore in the right knee. OBJECTIVE              45 min Therapeutic Exercise:  [x] See flow sheet : passive knee extension stretching   Rationale: increase ROM, increase strength, improve coordination, improve balance and increase proprioception to improve the patients ability to ambulate and squat    15 min Therapeutic Activity:  []  See flow sheet : bowling form and weight shifting performance for bowling movement   Rationale: increase ROM, increase strength, improve coordination, improve balance and increase proprioception  to improve the patients ability to perform weight shift and single limb balance for bowling          With   [x] TE   [] TA   [] neuro   [] other: Patient Education: [x] Review HEP    [] Progressed/Changed HEP based on:   [] positioning   [] body mechanics   [] transfers   [] heat/ice application    [] other:       Other Objective/Functional Measures: ---     Pain Level (0-10 scale) post treatment: 0/10     ASSESSMENT/Changes in Function:   Resolution of right knee pain with correction of bowling technique to advance to longer stride and balance control with the left LE.   Patient will continue to benefit from skilled PT services to modify and progress therapeutic interventions, address functional mobility deficits, address ROM deficits, address strength deficits, analyze and address soft tissue restrictions, analyze and cue movement patterns, analyze and modify body mechanics/ergonomics and assess and modify postural abnormalities to attain remaining goals.     [x]  See Plan of Care  []  See progress note/recertification  []  See Discharge Summary     Progress towards goals / Updated goals:  Long Term Goals: To be accomplished in 4-8 weeks:  1) Pt will be able to Navigate one flight of stairs without pain. 2) Pt will be able to Stand for >/= 30 minutes without pain.   Progression  3) Pt will be able to Sit for >/= 60 minutes without pain. 4) Pt will be able to Ambulate >/= 0.5 miles without pain. Progresing  5) Pt will be able to squat to retrieve item from ground without increase of pain.   6) Pt will be independent with HEP           PLAN  [x]  Upgrade activities as tolerated     [x]  Continue plan of care  []  Update interventions per flow sheet       []  Discharge due to:_  []  Other:_           Caffie Severs, PT, DPT, PTA, OPTA 5/21/2019

## 2019-05-23 ENCOUNTER — HOSPITAL ENCOUNTER (OUTPATIENT)
Dept: PHYSICAL THERAPY | Age: 59
Discharge: HOME | End: 2019-05-23
Payer: SELF-PAY

## 2019-05-23 PROCEDURE — 97110 THERAPEUTIC EXERCISES: CPT | Performed by: PHYSICAL THERAPIST

## 2019-05-23 NOTE — PROGRESS NOTES
PT DAILY TREATMENT NOTE - [facility] 2-15    Patient Name: Petros Combs  Date:2019  : 1960  [x]  Patient  Verified  Payor: SELF PAY / [facility] / Product Type: Self Pay /    In time:900a   Out time: 1000a  Total Treatment Time (min): 60  Total Timed Codes (min): 60  1:1 Treatment Time ( only): --   Visit #:  8    Treatment Area: Right knee pain [M25.561]    SUBJECTIVE  Pain Level (0-10 scale): 5/10  Any medication changes, allergies to medications, adverse drug reactions, diagnosis change, or new procedure performed?: [x] No    [] Yes (see summary sheet for update)  Subjective functional status/changes:   [] No changes reported  Pt states that she was able to rake and shovel wood chips from a stump ground up in her yard and her knee did not hurt with the activity. She feels ready to continue on her own at this point. OBJECTIVE              60 min Therapeutic Exercise:  [x] See flow sheet :   Rationale: increase ROM, increase strength, improve coordination, improve balance and increase proprioception to improve the patients ability to ambulate and squat            With   [x] TE   [] TA   [] neuro   [] other: Patient Education: [x] Review HEP    [] Progressed/Changed HEP based on:   [] positioning   [] body mechanics   [] transfers   [] heat/ice application    [] other:       Other Objective/Functional Measures: --- FOTO Functional Measure: Intake 32/100   Discharge 53/100     Pain Level (0-10 scale) post treatment: 0/10     ASSESSMENT/Changes in Function:   Pt has been instructed in specific HEP to continue on her own at this time. []  See Plan of Care  []  See progress note/recertification  [x]  See Discharge Summary     Progress towards goals / Updated goals:  Long Term Goals: To be accomplished in 4-8 weeks:  1) Pt will be able to Navigate one flight of stairs without pain. MET  2) Pt will be able to Stand for >/= 30 minutes without pain.  MET  3) Pt will be able to Sit for >/= 60 minutes without pain. MET  4) Pt will be able to Ambulate >/= 0.5 miles without pain. MET  5) Pt will be able to squat to retrieve item from ground without increase of pain.  MET  6) Pt will be independent with HEP MET           PLAN  []  Upgrade activities as tolerated     []  Continue plan of care  []  Update interventions per flow sheet       [x]  Discharge due to:_ Completion of Goals  []  Other:_           Miguelina Alberts, PT, DPT, PTA, OPTA 5/23/2019

## 2019-07-04 DIAGNOSIS — I10 ESSENTIAL HYPERTENSION WITH GOAL BLOOD PRESSURE LESS THAN 140/90: Chronic | ICD-10-CM

## 2019-07-05 RX ORDER — METOPROLOL SUCCINATE 50 MG/1
TABLET, EXTENDED RELEASE ORAL
Qty: 90 TAB | Refills: 0 | Status: SHIPPED | OUTPATIENT
Start: 2019-07-05 | End: 2019-10-02 | Stop reason: SDUPTHER

## 2019-07-05 RX ORDER — LISINOPRIL AND HYDROCHLOROTHIAZIDE 20; 25 MG/1; MG/1
TABLET ORAL
Qty: 90 TAB | Refills: 0 | Status: SHIPPED | OUTPATIENT
Start: 2019-07-05 | End: 2019-10-02 | Stop reason: SDUPTHER

## 2019-07-05 RX ORDER — POTASSIUM CHLORIDE 750 MG/1
TABLET, FILM COATED, EXTENDED RELEASE ORAL
Qty: 90 TAB | Refills: 0 | Status: SHIPPED | OUTPATIENT
Start: 2019-07-05 | End: 2019-10-02 | Stop reason: SDUPTHER

## 2019-07-05 NOTE — TELEPHONE ENCOUNTER
Please remind patient to get labs and set up a follow up apt asap.  I will not refill again without seeing her

## 2019-07-09 ENCOUNTER — OFFICE VISIT (OUTPATIENT)
Dept: INTERNAL MEDICINE CLINIC | Facility: CLINIC | Age: 59
End: 2019-07-09

## 2019-07-09 VITALS
HEIGHT: 64 IN | BODY MASS INDEX: 31.41 KG/M2 | TEMPERATURE: 98.3 F | OXYGEN SATURATION: 100 % | SYSTOLIC BLOOD PRESSURE: 126 MMHG | WEIGHT: 184 LBS | DIASTOLIC BLOOD PRESSURE: 81 MMHG | RESPIRATION RATE: 16 BRPM | HEART RATE: 73 BPM

## 2019-07-09 DIAGNOSIS — Z00.00 ROUTINE PHYSICAL EXAMINATION: Primary | ICD-10-CM

## 2019-07-09 DIAGNOSIS — G47.00 INSOMNIA, UNSPECIFIED TYPE: ICD-10-CM

## 2019-07-09 DIAGNOSIS — Z23 NEED FOR SHINGLES VACCINE: ICD-10-CM

## 2019-07-09 DIAGNOSIS — E66.9 OBESITY (BMI 30-39.9): ICD-10-CM

## 2019-07-09 RX ORDER — ALPRAZOLAM 0.5 MG/1
TABLET ORAL
Qty: 30 TAB | Refills: 0 | Status: SHIPPED | OUTPATIENT
Start: 2019-07-09 | End: 2020-02-03 | Stop reason: SDUPTHER

## 2019-07-09 NOTE — PROGRESS NOTES
Identified pt with two pt identifiers(name and ). Reviewed record in preparation for visit and have obtained necessary documentation. Chief Complaint   Patient presents with    Complete Physical        Health Maintenance Due   Topic    Shingrix Vaccine Age 50> (1 of 2)       Coordination of Care Questionnaire:  :   1) Have you been to an emergency room, urgent care, or hospitalized since your last visit? If yes, where when, and reason for visit? no     2. Have seen or consulted any other health care provider other than [facility] since your last visit? If yes, where when, and reason for visit? NO    3) Do you have an Advanced Directive/ Living Will in place? NO  If yes, do we have a copy on file NO  If no, would you like information NO    Patient is accompanied by self I have received verbal consent from Reymundo Plummer to discuss any/all medical information while they are present in the room.     Visit Vitals  /81 (BP 1 Location: Left arm, BP Patient Position: Sitting)   Pulse 73   Temp 98.3 °F (36.8 °C) (Oral)   Resp 16   Ht 5' 4\" (1.626 m)   Wt 184 lb (83.5 kg)   SpO2 100%   BMI 31.58 kg/m²     Bianca Suh LPN

## 2019-07-09 NOTE — PROGRESS NOTES
Subjective:      Ivanna Angel is a 59 y.o. female who presents today for CPE. Health Maintenance  Immunizations:   Influenza: n/a. Tetanus: up to date. Shingles: not UTD - script provided. Pneumonia: n/a. Cancer screening:   Cervical: reviewed guidelines, n/a. Breast: reviewed guidelines, she states she is getting this done in August.  Colon: reviewed guidelines, up to date. Bone Density: she declines    Body mass index is 31.58 kg/m². Wt Readings from Last 3 Encounters:   07/09/19 184 lb (83.5 kg)   04/17/19 184 lb 11.2 oz (83.8 kg)   04/09/19 185 lb (83.9 kg)         Patient Care Team:  Ariel Mazariegos NP as PCP - General (Nurse Practitioner)  Thor Nicholas MD as Surgeon (General Surgery)  Shelbie Mayers MD (Cardiology)  Janie Arciniega MD as Consulting Provider [facility])    The following sections were reviewed & updated as appropriate: PMH, PSH, FH, and SH. Patient Active Problem List   Diagnosis Code    Hypertension, goal below 140/90 I10    Hyperlipidemia with target LDL less than 100 E78.5    Sigmoid diverticulitis K57.32    Diverticulosis of large intestine with hemorrhage K57.31    Anxiety disorder F41.9    Dehydration E86.0    Hypovitaminosis D E55.9    Microscopic hematuria R31.29    Diverticulitis of intestine with abscess K57.80    Diverticulitis K57.92    Colovesical fistula N32.1    Insomnia G47.00    Obesity (BMI 30-39. 9) E66.9    Elevated hemoglobin A1c R73.09          Prior to Admission medications    Medication Sig Start Date End Date Taking?  Authorizing Provider   metoprolol succinate (TOPROL-XL) 50 mg XL tablet TAKE ONE TABLET BY MOUTH DAILY 7/5/19   Skiff, Karlis Comfort, NP   lisinopril-hydroCHLOROthiazide (PRINZIDE, ZESTORETIC) 20-25 mg per tablet TAKE ONE TABLET BY MOUTH DAILY 7/5/19   Skiff, Karlis Comfort, NP   potassium chloride SR (KLOR-CON 10) 10 mEq tablet TAKE ONE TABLET BY MOUTH DAILY 7/5/19   Skiff, Karlis Comfort, NP   ALPRAZolam Leia Murrell) 0.5 mg tablet TAKE ONE TABLET BY MOUTH EVERY NIGHT AT BEDTIME AS NEEDED FOR SLEEP 4/9/19   Skiff, Ilah Flight, NP   cetirizine (ZYRTEC) 10 mg tablet Take 1 Tab by mouth daily. 4/9/19   Skiff, Ilah Flight, JORGE L      Allergies   Allergen Reactions    Cephalexin Hives      Past Surgical History:   Procedure Laterality Date    ABDOMEN SURGERY PROC UNLISTED  02/01/2017    laparoscopic sigmoid colectomy Dr. Twin Feldman HX BREAST BIOPSY Right     Stereo Bx 2007 - BENIGN    HX COLONOSCOPY  07/23/2015    HX GYN      ectopic pregnancy    HX HYSTERECTOMY  3/17/2005    TVH     Family History   Problem Relation Age of Onset    Hypertension Mother     Hypertension Father     Hypertension Sister     Hypertension Brother     Hypertension Sister     Hypertension Sister     Hypertension Sister      Social History     Tobacco Use    Smoking status: Never Smoker    Smokeless tobacco: Never Used   Substance Use Topics    Alcohol use: Yes     Alcohol/week: 1.2 oz     Types: 2 Glasses of wine per week     Comment: occassionally:       Review of Systems    A comprehensive review of systems was negative except for that written in the HPI. Objective:     Visit Vitals  /81 (BP 1 Location: Left arm, BP Patient Position: Sitting)   Pulse 73   Temp 98.3 °F (36.8 °C) (Oral)   Resp 16   Ht 5' 4\" (1.626 m)   Wt 184 lb (83.5 kg)   LMP 02/12/2005   SpO2 100%   BMI 31.58 kg/m²     General:  Alert, cooperative, no distress, appears stated age, obese. Head:  Normocephalic, without obvious abnormality, atraumatic. Eyes:  Conjunctivae/corneas clear. PERRL, EOMs intact. Fundi benign. Ears:  Normal TMs and external ear canals both ears. Nose: Nares normal. Septum midline. Mucosa normal. No drainage or sinus tenderness. Throat: Lips, mucosa, and tongue normal. Teeth and gums normal.   Neck: Supple, symmetrical, trachea midline, no adenopathy, thyroid: no enlargement/tenderness/nodules, no carotid bruit and no JVD. Back:   Symmetric, no curvature. ROM normal. No CVA tenderness. Lungs:   Clear to auscultation bilaterally. Chest wall:  No tenderness or deformity. Heart:  Regular rate and rhythm, S1, S2 normal, no murmur, click, rub or gallop. Breast Exam:  No tenderness, masses, or nipple abnormality. Normal breast exam   Abdomen:   Soft, non-tender. Bowel sounds normal. No masses,  No organomegaly. Extremities: Extremities normal, atraumatic, no cyanosis or edema. Pulses: 2+ and symmetric all extremities. Skin: Skin color, texture, turgor normal. No rashes or lesions. Lymph nodes: Cervical, supraclavicular, and axillary nodes normal.   Neurologic: CNII-XII intact. Normal strength, sensation and reflexes throughout. Nursing note and vitals reviewed  Assessment/Plan:       ICD-10-CM ICD-9-CM    1. Routine physical examination Z00.00 V70.0    2. Need for shingles vaccine Z23 V04.89 varicella-zoster recombinant, PF, (SHINGRIX, PF,) 50 mcg/0.5 mL susr injection   3. Insomnia, unspecified type G47.00 780.52 ALPRAZolam (XANAX) 0.5 mg tablet   4. Obesity (BMI 30-39. 9) E66.9 278.00            Follow-up and Dispositions    · Return for Depending on labs, Get fasting labs drawn. Advised her to call back or return to office if symptoms worsen/change/persist.  Discussed expected course/resolution/complications of diagnosis in detail with patient. Medication risks/benefits/costs/interactions/alternatives discussed with patient. She was given an after visit summary which includes diagnoses, current medications, & vitals. She expressed understanding with the diagnosis and plan.

## 2019-07-09 NOTE — PATIENT INSTRUCTIONS
Starting a Weight Loss Plan: Care Instructions  Your Care Instructions    If you are thinking about losing weight, it can be hard to know where to start. Your doctor can help you set up a weight loss plan that best meets your needs. You may want to take a class on nutrition or exercise, or join a weight loss support group. If you have questions about how to make changes to your eating or exercise habits, ask your doctor about seeing a registered dietitian or an exercise specialist.  It can be a big challenge to lose weight. But you do not have to make huge changes at once. Make small changes, and stick with them. When those changes become habit, add a few more changes. If you do not think you are ready to make changes right now, try to pick a date in the future. Make an appointment to see your doctor to discuss whether the time is right for you to start a plan. Follow-up care is a key part of your treatment and safety. Be sure to make and go to all appointments, and call your doctor if you are having problems. It's also a good idea to know your test results and keep a list of the medicines you take. How can you care for yourself at home? · Set realistic goals. Many people expect to lose much more weight than is likely. A weight loss of 5% to 10% of your body weight may be enough to improve your health. · Get family and friends involved to provide support. Talk to them about why you are trying to lose weight, and ask them to help. They can help by participating in exercise and having meals with you, even if they may be eating something different. · Find what works best for you. If you do not have time or do not like to cook, a program that offers meal replacement bars or shakes may be better for you. Or if you like to prepare meals, finding a plan that includes daily menus and recipes may be best.  · Ask your doctor about other health professionals who can help you achieve your weight loss goals. ?  A dietitian can help you make healthy changes in your diet. ? An exercise specialist or  can help you develop a safe and effective exercise program.  ? A counselor or psychiatrist can help you cope with issues such as depression, anxiety, or family problems that can make it hard to focus on weight loss. · Consider joining a support group for people who are trying to lose weight. Your doctor can suggest groups in your area. Where can you learn more? Go to http://jean carlos-rufus.info/. Enter W452 in the search box to learn more about \"Starting a Weight Loss Plan: Care Instructions. \"  Current as of: June 25, 2018  Content Version: 11.9  © 2509-1888 [employer]. Care instructions adapted under license by [employer] (which disclaims liability or warranty for this information). If you have questions about a medical condition or this instruction, always ask your healthcare professional. [address] any warranty or liability for your use of this information. Learning About the [address] Diet  What is the Mediterranean diet? The Mediterranean diet is a style of eating rather than a diet plan. It features foods eaten in [place], [place], [place] and [place], and other countries along the [facility]. It emphasizes eating foods like fish, fruits, vegetables, beans, high-fiber breads and whole grains, nuts, and olive oil. This style of eating includes limited red meat, cheese, and sweets. Why choose the Mediterranean diet? A Mediterranean-style diet may improve heart health. It contains more fat than other heart-healthy diets. But the fats are mainly from nuts, unsaturated oils (such as fish oils and olive oil), and certain nut or seed oils (such as canola, soybean, or flaxseed oil). These fats may help protect the heart and blood vessels. How can you get started on the Mediterranean diet?   Here are some things you can do to switch to a more Mediterranean way of eating. What to eat  · Eat a variety of fruits and vegetables each day, such as grapes, blueberries, tomatoes, broccoli, peppers, figs, olives, spinach, eggplant, beans, lentils, and chickpeas. · Eat a variety of whole-grain foods each day, such as oats, brown rice, and whole wheat bread, pasta, and couscous. · Eat fish at least 2 times a week. Try tuna, salmon, mackerel, lake trout, herring, or sardines. · Eat moderate amounts of low-fat dairy products, such as milk, cheese, or yogurt. · Eat moderate amounts of poultry and eggs. · Choose healthy (unsaturated) fats, such as nuts, olive oil, and certain nut or seed oils like canola, soybean, and flaxseed. · Limit unhealthy (saturated) fats, such as butter, palm oil, and coconut oil. And limit fats found in animal products, such as meat and dairy products made with whole milk. Try to eat red meat only a few times a month in very small amounts. · Limit sweets and desserts to only a few times a week. This includes sugar-sweetened drinks like soda. The Mediterranean diet may also include red wine with your meal--1 glass each day for women and up to 2 glasses a day for men. Tips for eating at home  · Use herbs, spices, garlic, lemon zest, and citrus juice instead of salt to add flavor to foods. · Add avocado slices to your sandwich instead of nuñez. · Have fish for lunch or dinner instead of red meat. Brush the fish with olive oil, and broil or grill it. · Sprinkle your salad with seeds or nuts instead of cheese. · Cook with olive or canola oil instead of butter or oils that are high in saturated fat. · Switch from 2% milk or whole milk to 1% or fat-free milk. · Dip raw vegetables in a vinaigrette dressing or hummus instead of dips made from mayonnaise or sour cream.  · Have a piece of fruit for dessert instead of a piece of cake. Try baked apples, or have some dried fruit.   Tips for eating out  · Try broiled, grilled, baked, or poached fish instead of having it fried or breaded. · Ask your  to have your meals prepared with olive oil instead of butter. · Order dishes made with marinara sauce or sauces made from olive oil. Avoid sauces made from cream or mayonnaise. · Choose whole-grain breads, whole wheat pasta and pizza crust, brown rice, beans, and lentils. · Cut back on butter or margarine on bread. Instead, you can dip your bread in a small amount of olive oil. · Ask for a side salad or grilled vegetables instead of french fries or chips. Where can you learn more? Go to http://jean carlos-rufus.info/. Enter [phone] in the search box to learn more about \"Learning About the Mediterranean Diet. \"  Current as of: March 28, 2018  Content Version: 11.9  © 9729-3559 [employer]. Care instructions adapted under license by [employer] (which disclaims liability or warranty for this information). If you have questions about a medical condition or this instruction, always ask your healthcare professional. [place] [address] any warranty or liability for your use of this information.

## 2019-07-13 LAB
25(OH)D3+25(OH)D2 SERPL-MCNC: 22.1 NG/ML (ref 30–100)
ALBUMIN SERPL-MCNC: 4.2 G/DL (ref 3.6–4.8)
ALBUMIN/GLOB SERPL: 1.7 {RATIO} (ref 1.2–2.2)
ALP SERPL-CCNC: 54 IU/L (ref 39–117)
ALT SERPL-CCNC: 25 IU/L (ref 0–32)
AST SERPL-CCNC: 12 IU/L (ref 0–40)
BASOPHILS # BLD AUTO: 0 X10E3/UL (ref 0–0.2)
BASOPHILS NFR BLD AUTO: 0 %
BILIRUB SERPL-MCNC: 0.3 MG/DL (ref 0–1.2)
BUN SERPL-MCNC: 11 MG/DL (ref 8–27)
BUN/CREAT SERPL: 14 (ref 12–28)
CALCIUM SERPL-MCNC: 9.4 MG/DL (ref 8.7–10.3)
CHLORIDE SERPL-SCNC: 99 MMOL/L (ref 96–106)
CHOLEST SERPL-MCNC: 186 MG/DL (ref 100–199)
CO2 SERPL-SCNC: 25 MMOL/L (ref 20–29)
CREAT SERPL-MCNC: 0.8 MG/DL (ref 0.57–1)
EOSINOPHIL # BLD AUTO: 0.1 X10E3/UL (ref 0–0.4)
EOSINOPHIL NFR BLD AUTO: 3 %
ERYTHROCYTE [DISTWIDTH] IN BLOOD BY AUTOMATED COUNT: 14.3 % (ref 12.3–15.4)
GLOBULIN SER CALC-MCNC: 2.5 G/DL (ref 1.5–4.5)
GLUCOSE SERPL-MCNC: 103 MG/DL (ref 65–99)
HBA1C MFR BLD: 6.3 % (ref 4.8–5.6)
HCT VFR BLD AUTO: 37.6 % (ref 34–46.6)
HDLC SERPL-MCNC: 54 MG/DL
HGB BLD-MCNC: 12.6 G/DL (ref 11.1–15.9)
IMM GRANULOCYTES # BLD AUTO: 0 X10E3/UL (ref 0–0.1)
IMM GRANULOCYTES NFR BLD AUTO: 0 %
LDLC SERPL CALC-MCNC: 119 MG/DL (ref 0–99)
LYMPHOCYTES # BLD AUTO: 1.8 X10E3/UL (ref 0.7–3.1)
LYMPHOCYTES NFR BLD AUTO: 42 %
MCH RBC QN AUTO: 29.9 PG (ref 26.6–33)
MCHC RBC AUTO-ENTMCNC: 33.5 G/DL (ref 31.5–35.7)
MCV RBC AUTO: 89 FL (ref 79–97)
MONOCYTES # BLD AUTO: 0.3 X10E3/UL (ref 0.1–0.9)
MONOCYTES NFR BLD AUTO: 7 %
NEUTROPHILS # BLD AUTO: 2.1 X10E3/UL (ref 1.4–7)
NEUTROPHILS NFR BLD AUTO: 48 %
PLATELET # BLD AUTO: 188 X10E3/UL (ref 150–450)
POTASSIUM SERPL-SCNC: 3.3 MMOL/L (ref 3.5–5.2)
PROT SERPL-MCNC: 6.7 G/DL (ref 6–8.5)
RBC # BLD AUTO: 4.21 X10E6/UL (ref 3.77–5.28)
SODIUM SERPL-SCNC: 140 MMOL/L (ref 134–144)
TRIGL SERPL-MCNC: 64 MG/DL (ref 0–149)
VLDLC SERPL CALC-MCNC: 13 MG/DL (ref 5–40)
WBC # BLD AUTO: 4.3 X10E3/UL (ref 3.4–10.8)

## 2019-07-15 ENCOUNTER — TELEPHONE (OUTPATIENT)
Dept: INTERNAL MEDICINE CLINIC | Facility: CLINIC | Age: 59
End: 2019-07-15

## 2019-07-15 DIAGNOSIS — E87.6 HYPOKALEMIA: ICD-10-CM

## 2019-07-15 DIAGNOSIS — R73.03 PREDIABETES: ICD-10-CM

## 2019-07-15 DIAGNOSIS — E55.9 HYPOVITAMINOSIS D: Primary | Chronic | ICD-10-CM

## 2019-07-15 DIAGNOSIS — E78.5 HYPERLIPIDEMIA WITH TARGET LDL LESS THAN 100: Primary | Chronic | ICD-10-CM

## 2019-07-15 RX ORDER — METFORMIN HYDROCHLORIDE 500 MG/1
500 TABLET ORAL 2 TIMES DAILY WITH MEALS
Qty: 60 TAB | Refills: 5 | Status: SHIPPED | OUTPATIENT
Start: 2019-07-15 | End: 2020-01-27

## 2019-07-15 RX ORDER — CHOLECALCIFEROL (VITAMIN D3) 125 MCG
1000 CAPSULE ORAL DAILY
Qty: 90 TAB | Refills: 3 | Status: SHIPPED | OUTPATIENT
Start: 2019-07-15 | End: 2020-02-03 | Stop reason: SDUPTHER

## 2019-07-15 NOTE — PROGRESS NOTES
Cholesterol has improved to LDL at 119, last year it was 147. Potassium is low, she is on supplementation for this. Hyperglycemia noted. Vitamin D levels are low, daily supplement sent to pharmacy. A1c shows prediabetes has worsened, metformin recommended.

## 2019-08-10 ENCOUNTER — DOCUMENTATION ONLY (OUTPATIENT)
Dept: FAMILY PLANNING/WOMEN'S HEALTH CLINIC | Age: 59
End: 2019-08-10

## 2019-08-10 ENCOUNTER — HOSPITAL ENCOUNTER (OUTPATIENT)
Dept: MAMMOGRAPHY | Age: 59
Discharge: HOME | End: 2019-08-10
Attending: NURSE PRACTITIONER

## 2019-08-10 DIAGNOSIS — Z12.31 VISIT FOR SCREENING MAMMOGRAM: ICD-10-CM

## 2019-08-10 PROCEDURE — 77067 SCR MAMMO BI INCL CAD: CPT

## 2019-08-10 NOTE — PROGRESS NOTES
HISTORY OF PRESENT ILLNESS  Danae Olivares is a 59 y.o. female here for EWL. HPI Ms. Elida Díaz denies abnormal SBE's, performs monthly. She denies abnormal vaginal discharge and bloating, no pelvic pain. H/O partial hysterectomy in 2005 for bleeding. H/O right breast needle biopsy in 2007 that was neg. Up to date with colonoscopy- next one due in 5 years. Non-smoker. Review of Systems   Constitutional: Negative for chills, fever and weight loss. Gastrointestinal: Negative for abdominal pain, blood in stool, constipation and diarrhea. Physical Exam   Constitutional: She is oriented to person, place, and time. She appears well-developed and well-nourished. Pulmonary/Chest: Right breast exhibits no inverted nipple, no mass, no nipple discharge, no skin change and no tenderness. Left breast exhibits no inverted nipple, no mass, no nipple discharge, no skin change and no tenderness. Breasts are symmetrical.   Genitourinary: Rectum normal and vagina normal. Rectal exam shows no mass, no tenderness and guaiac negative stool. Pelvic exam was performed with patient supine. There is no rash, tenderness or lesion on the right labia. There is no rash, tenderness or lesion on the left labia. Right adnexum displays no mass, no tenderness and no fullness. Left adnexum displays no mass, no tenderness and no fullness. No erythema, tenderness or bleeding in the vagina. No vaginal discharge found. Genitourinary Comments: Vaginal cuff intact. Lymphadenopathy:     She has no cervical adenopathy. She has no axillary adenopathy. Right: No inguinal and no supraclavicular adenopathy present. Left: No inguinal and no supraclavicular adenopathy present. Neurological: She is alert and oriented to person, place, and time. Skin: Skin is warm and dry. Psychiatric: She has a normal mood and affect. Her behavior is normal. Thought content normal.   Nursing note and vitals reviewed.       ASSESSMENT and PLAN  1. EWL  2. CBE benign  3. Screening mammogram today  4. H/O right breast biopsy, negative (2007)  5.  H/O partial hysterectomy, 2005

## 2019-10-02 DIAGNOSIS — I10 ESSENTIAL HYPERTENSION WITH GOAL BLOOD PRESSURE LESS THAN 140/90: Chronic | ICD-10-CM

## 2019-10-02 RX ORDER — LISINOPRIL AND HYDROCHLOROTHIAZIDE 20; 25 MG/1; MG/1
TABLET ORAL
Qty: 90 TAB | Refills: 1 | Status: SHIPPED | OUTPATIENT
Start: 2019-10-02 | End: 2020-04-16

## 2019-10-02 RX ORDER — POTASSIUM CHLORIDE 750 MG/1
TABLET, FILM COATED, EXTENDED RELEASE ORAL
Qty: 90 TAB | Refills: 1 | Status: SHIPPED | OUTPATIENT
Start: 2019-10-02 | End: 2020-04-16

## 2019-10-02 RX ORDER — METOPROLOL SUCCINATE 50 MG/1
TABLET, EXTENDED RELEASE ORAL
Qty: 90 TAB | Refills: 1 | Status: SHIPPED | OUTPATIENT
Start: 2019-10-02 | End: 2020-04-16

## 2019-10-02 NOTE — TELEPHONE ENCOUNTER
Please ask patient to set up follow up in 3-4 months when I return. She should come in fasting so I can repeat labs. Thanks!

## 2020-01-14 DIAGNOSIS — R73.03 PREDIABETES: ICD-10-CM

## 2020-01-27 RX ORDER — METFORMIN HYDROCHLORIDE 500 MG/1
TABLET ORAL
Qty: 60 TAB | Refills: 4 | Status: SHIPPED | OUTPATIENT
Start: 2020-01-27 | End: 2020-07-01

## 2020-02-03 ENCOUNTER — OFFICE VISIT (OUTPATIENT)
Dept: INTERNAL MEDICINE CLINIC | Age: 60
End: 2020-02-03

## 2020-02-03 VITALS
HEIGHT: 64 IN | HEART RATE: 70 BPM | BODY MASS INDEX: 31.24 KG/M2 | TEMPERATURE: 97.7 F | RESPIRATION RATE: 18 BRPM | OXYGEN SATURATION: 96 % | SYSTOLIC BLOOD PRESSURE: 156 MMHG | DIASTOLIC BLOOD PRESSURE: 86 MMHG | WEIGHT: 183 LBS

## 2020-02-03 DIAGNOSIS — R73.03 PREDIABETES: Primary | ICD-10-CM

## 2020-02-03 DIAGNOSIS — G47.00 INSOMNIA, UNSPECIFIED TYPE: ICD-10-CM

## 2020-02-03 DIAGNOSIS — E78.5 HYPERLIPIDEMIA WITH TARGET LDL LESS THAN 100: ICD-10-CM

## 2020-02-03 DIAGNOSIS — I10 ESSENTIAL HYPERTENSION WITH GOAL BLOOD PRESSURE LESS THAN 140/90: ICD-10-CM

## 2020-02-03 DIAGNOSIS — E55.9 HYPOVITAMINOSIS D: ICD-10-CM

## 2020-02-03 RX ORDER — CHOLECALCIFEROL (VITAMIN D3) 125 MCG
1000 CAPSULE ORAL DAILY
Qty: 90 TAB | Refills: 3
Start: 2020-02-03 | End: 2021-02-08 | Stop reason: SDUPTHER

## 2020-02-03 RX ORDER — ALPRAZOLAM 0.5 MG/1
TABLET ORAL
Qty: 30 TAB | Refills: 1 | Status: SHIPPED | OUTPATIENT
Start: 2020-02-03 | End: 2020-08-31

## 2020-02-03 NOTE — PROGRESS NOTES
Subjective:      Dragan Bazzi is a 62 y.o. female who presents today for follow up. Endocrine Review  She is seen for prediabetes. Since last visit she reports: no polyuria or polydipsia, no chest pain or dyspnea, no chest pain, dyspnea or TIA's, no numbness, tingling or pain in extremities, no unusual visual symptoms, no hypoglycemia, no significant changes. She reports medication compliance: noncompliant some of the time. Medication side effects: none. Diabetic diet compliance: noncompliant much of the time. Lab review: labs pending. Cardiovascular Review  The patient has hypertension. Since last visit: no changes. She reports taking medications as instructed, no medication side effects noted, no TIA's, no chest pain on exertion, no dyspnea on exertion, no swelling of ankles, no palpitations. Diet and Lifestyle: generally follows a low fat low cholesterol diet, generally follows a low sodium diet, no formal exercise but active during the day. Labs: pending. She notes she ate a lot of salty foods last night during the super bowl. She believes this is directly related to her BP. BP Readings from Last 3 Encounters:   02/03/20 156/86   08/10/19 126/86   07/09/19 126/81     Abdominal pain: she notes intermittent abdominal pain, she notes pain with sex. She denies dysuria, vaginal discharge, fevers, back pain. She denies pain today. She is not sure what this is related to. Right knee pain: she notes she has seen ortho who told her the cartilage is gone. She notes PT helps. Ice and heat helps. She notes pain is worst in the morning and at night. She uses biofreeze, [employer]roworks. She notes it helps temporarily.      Patient Active Problem List    Diagnosis Date Noted    Elevated hemoglobin A1c 04/16/2018    Obesity (BMI 30-39.9) 04/13/2018    Insomnia 07/20/2017    Diverticulitis 02/01/2017    Colovesical fistula 02/01/2017    Diverticulitis of intestine with abscess 09/13/2016    Hypovitaminosis D 04/27/2015    Microscopic hematuria 04/27/2015    Sigmoid diverticulitis 02/14/2014    Diverticulosis of large intestine with hemorrhage 02/14/2014     Class: Chronic    Anxiety disorder 02/14/2014     Class: Chronic    Dehydration 02/14/2014    Hyperlipidemia with target LDL less than 100 05/13/2013     Class: Chronic    Hypertension, goal below 140/90 10/16/2012     Class: Chronic     Current Outpatient Medications   Medication Sig Dispense Refill    ALPRAZolam (XANAX) 0.5 mg tablet TAKE ONE TABLET BY MOUTH EVERY NIGHT AT BEDTIME AS NEEDED FOR SLEEP 30 Tab 1    cholecalciferol, vitamin D3, 50 mcg (2,000 unit) tab Take 0.5 Tabs by mouth daily.  90 Tab 3    metFORMIN (GLUCOPHAGE) 500 mg tablet TAKE ONE TABLET BY MOUTH TWICE A DAY WITH MEALS 60 Tab 4    lisinopril-hydroCHLOROthiazide (PRINZIDE, ZESTORETIC) 20-25 mg per tablet TAKE ONE TABLET BY MOUTH DAILY 90 Tab 1    metoprolol succinate (TOPROL-XL) 50 mg XL tablet TAKE ONE TABLET BY MOUTH DAILY 90 Tab 1    potassium chloride SR (KLOR-CON 10) 10 mEq tablet TAKE ONE TABLET BY MOUTH DAILY 90 Tab 1     Allergies   Allergen Reactions    Cephalexin Hives     Past Medical History:   Diagnosis Date    Diverticulosis of large intestine with hemorrhage 02/14/2014    distal sigmoid colon, mid-sigmoid colon, descending colon - colonoscopy 11/9/16    Hyperlipemia      5/2016; not on medication    Hypertension     Hypovitaminosis D 04/27/2015    level normal 5/2016    Microscopic hematuria 4/27/2015    Psychiatric disorder     anxiety     Past Surgical History:   Procedure Laterality Date    ABDOMEN SURGERY PROC UNLISTED  02/01/2017    laparoscopic sigmoid colectomy Dr. Simon Gauthier HX BREAST BIOPSY Right     Stereo Bx 2007 - BENIGN    HX COLONOSCOPY  07/23/2015    HX GYN      ectopic pregnancy    HX HYSTERECTOMY  3/17/2005    TVH        Review of Systems    A comprehensive review of systems was negative except for that written in the HPI. Objective:     Visit Vitals  /86 (BP 1 Location: Right arm)   Pulse 70   Temp 97.7 °F (36.5 °C) (Oral)   Resp 18   Ht 5' 4\" (1.626 m)   Wt 183 lb (83 kg)   LMP 02/12/2005   SpO2 96%   BMI 31.41 kg/m²     General appearance: alert, cooperative, no distress, appears stated age  Head: Normocephalic, without obvious abnormality, atraumatic  Eyes: negative  Back: symmetric, no curvature. ROM normal. No CVA tenderness. Lungs: clear to auscultation bilaterally  Heart: regular rate and rhythm, S1, S2 normal, no murmur, click, rub or gallop  Extremities: extremities normal, atraumatic, no cyanosis or edema  Pulses: 2+ and symmetric  Skin: Skin color, texture, turgor normal. No rashes or lesions  Neurologic: Grossly normal  Psych: appropriate mood, speech, affect    Nursing note and vitals reviewed  Assessment/Plan:       ICD-10-CM ICD-9-CM    1. Prediabetes N43.91 333.43 METABOLIC PANEL, COMPREHENSIVE      HEMOGLOBIN A1C W/O EAG   2. Essential hypertension with goal blood pressure less than 140/90 L02 351.4 METABOLIC PANEL, COMPREHENSIVE   3. Hyperlipidemia with target LDL less than 100 E78.5 272.4 LIPID PANEL   4. Hypovitaminosis D E55.9 268.9 VITAMIN D, 25 HYDROXY      cholecalciferol, vitamin D3, 50 mcg (2,000 unit) tab   5. Insomnia, unspecified type G47.00 780.52 ALPRAZolam (XANAX) 0.5 mg tablet   She reports normal pressures at home. She will call to update office if BP remains elevated. Follow-up and Dispositions    · Return in about 6 months (around 8/3/2020) for chronic care, Get fasting labs drawn. Advised her to call back or return to office if symptoms worsen/change/persist.  Discussed expected course/resolution/complications of diagnosis in detail with patient. Medication risks/benefits/costs/interactions/alternatives discussed with patient. She was given an after visit summary which includes diagnoses, current medications, & vitals.   She expressed understanding with the diagnosis and plan.

## 2020-02-03 NOTE — PATIENT INSTRUCTIONS
Medicare application:  
 
[email or web address] 
 
[phone]

## 2020-02-04 LAB
25(OH)D3+25(OH)D2 SERPL-MCNC: 34.6 NG/ML (ref 30–100)
ALBUMIN SERPL-MCNC: 4.3 G/DL (ref 3.8–4.8)
ALBUMIN/GLOB SERPL: 1.7 {RATIO} (ref 1.2–2.2)
ALP SERPL-CCNC: 55 IU/L (ref 39–117)
ALT SERPL-CCNC: 22 IU/L (ref 0–32)
AST SERPL-CCNC: 18 IU/L (ref 0–40)
BILIRUB SERPL-MCNC: 0.2 MG/DL (ref 0–1.2)
BUN SERPL-MCNC: 12 MG/DL (ref 8–27)
BUN/CREAT SERPL: 18 (ref 12–28)
CALCIUM SERPL-MCNC: 9.8 MG/DL (ref 8.7–10.3)
CHLORIDE SERPL-SCNC: 102 MMOL/L (ref 96–106)
CHOLEST SERPL-MCNC: 224 MG/DL (ref 100–199)
CO2 SERPL-SCNC: 24 MMOL/L (ref 20–29)
CREAT SERPL-MCNC: 0.66 MG/DL (ref 0.57–1)
GLOBULIN SER CALC-MCNC: 2.6 G/DL (ref 1.5–4.5)
GLUCOSE SERPL-MCNC: 89 MG/DL (ref 65–99)
HBA1C MFR BLD: 6 % (ref 4.8–5.6)
HDLC SERPL-MCNC: 56 MG/DL
LDLC SERPL CALC-MCNC: 152 MG/DL (ref 0–99)
POTASSIUM SERPL-SCNC: 4.2 MMOL/L (ref 3.5–5.2)
PROT SERPL-MCNC: 6.9 G/DL (ref 6–8.5)
SODIUM SERPL-SCNC: 144 MMOL/L (ref 134–144)
TRIGL SERPL-MCNC: 80 MG/DL (ref 0–149)
VLDLC SERPL CALC-MCNC: 16 MG/DL (ref 5–40)

## 2020-02-04 NOTE — PROGRESS NOTES
Cholesterol has worsened. 3 months of lifestyle changes recommended. Vitamin D is normal! A1c has improved since last visit!

## 2020-07-01 DIAGNOSIS — R73.03 PREDIABETES: ICD-10-CM

## 2020-07-01 RX ORDER — METFORMIN HYDROCHLORIDE 500 MG/1
TABLET ORAL
Qty: 60 TAB | Refills: 3 | Status: SHIPPED | OUTPATIENT
Start: 2020-07-01 | End: 2021-02-08 | Stop reason: SDUPTHER

## 2020-07-14 ENCOUNTER — OFFICE VISIT (OUTPATIENT)
Dept: INTERNAL MEDICINE CLINIC | Age: 60
End: 2020-07-14

## 2020-07-14 VITALS
HEIGHT: 64 IN | SYSTOLIC BLOOD PRESSURE: 140 MMHG | OXYGEN SATURATION: 99 % | WEIGHT: 172.6 LBS | BODY MASS INDEX: 29.47 KG/M2 | DIASTOLIC BLOOD PRESSURE: 82 MMHG | HEART RATE: 76 BPM | TEMPERATURE: 97.6 F | RESPIRATION RATE: 16 BRPM

## 2020-07-14 DIAGNOSIS — Z13.820 SCREENING FOR OSTEOPOROSIS: ICD-10-CM

## 2020-07-14 DIAGNOSIS — B96.89 BACTERIAL SINUSITIS: ICD-10-CM

## 2020-07-14 DIAGNOSIS — G89.29 CHRONIC PAIN OF RIGHT KNEE: ICD-10-CM

## 2020-07-14 DIAGNOSIS — R73.09 ELEVATED HEMOGLOBIN A1C: ICD-10-CM

## 2020-07-14 DIAGNOSIS — I10 HYPERTENSION, GOAL BELOW 140/90: Chronic | ICD-10-CM

## 2020-07-14 DIAGNOSIS — Z00.00 ROUTINE PHYSICAL EXAMINATION: Primary | ICD-10-CM

## 2020-07-14 DIAGNOSIS — M67.40 GANGLION CYST: ICD-10-CM

## 2020-07-14 DIAGNOSIS — Z23 NEED FOR SHINGLES VACCINE: ICD-10-CM

## 2020-07-14 DIAGNOSIS — E78.5 HYPERLIPIDEMIA WITH TARGET LDL LESS THAN 100: Chronic | ICD-10-CM

## 2020-07-14 DIAGNOSIS — M25.561 CHRONIC PAIN OF RIGHT KNEE: ICD-10-CM

## 2020-07-14 DIAGNOSIS — J32.9 BACTERIAL SINUSITIS: ICD-10-CM

## 2020-07-14 RX ORDER — ZOSTER VACCINE RECOMBINANT, ADJUVANTED 50 MCG/0.5
0.5 KIT INTRAMUSCULAR ONCE
Qty: 0.5 ML | Refills: 0 | Status: SHIPPED | OUTPATIENT
Start: 2020-07-14 | End: 2020-07-14

## 2020-07-14 NOTE — PROGRESS NOTES
Chief Complaint   Patient presents with    Medication Evaluation     Reviewed record in preparation for visit and have obtained necessary documentation. Identified pt with two pt identifiers(name and ). Health Maintenance Due   Topic    Shingrix Vaccine Age 49> (1 of 2)   24 [facility] Breast Cancer Screen Mammogram          Chief Complaint   Patient presents with    Medication Evaluation        Wt Readings from Last 3 Encounters:   20 172 lb 9.6 oz (78.3 kg)   20 183 lb (83 kg)   19 184 lb (83.5 kg)     Temp Readings from Last 3 Encounters:   20 97.6 °F (36.4 °C) (Temporal)   20 97.7 °F (36.5 °C) (Oral)   19 98.3 °F (36.8 °C) (Oral)     BP Readings from Last 3 Encounters:   20 140/82   20 156/86   08/10/19 126/86     Pulse Readings from Last 3 Encounters:   20 76   20 70   19 73           Learning Assessment:  :     Learning Assessment 2019   PRIMARY LEARNER Patient Patient Patient Patient   BARRIERS PRIMARY LEARNER NONE - - -   PRIMARY LANGUAGE ENGLISH ENGLISH ENGLISH ENGLISH   LEARNER PREFERENCE PRIMARY LISTENING LISTENING DEMONSTRATION LISTENING   ANSWERED BY self patient patient patient   RELATIONSHIP SELF SELF SELF SELF       Depression Screening:  :     3 most recent PHQ Screens 2/3/2020   Little interest or pleasure in doing things Not at all   Feeling down, depressed, irritable, or hopeless Not at all   Total Score PHQ 2 0       Fall Risk Assessment:  :     No flowsheet data found. Abuse Screening:  :     No flowsheet data found.     Coordination of Care Questionnaire:  :     1) Have you been to an emergency room, urgent care clinic since your last visit? no   Hospitalized since your last visit? no             2) Have you seen or consulted any other health care providers outside of [address] [place] [address] since your last visit? no  (Include any pap smears or colon screenings in this section.)    3) Do you have an Advance Directive on file? no    4) Are you interested in receiving information on Advance Directives? NO      Patient is accompanied by self I have received verbal consent from Steph Ngo to discuss any/all medical information while they are present in the room. Reviewed record  In preparation for visit and have obtained necessary documentation.

## 2020-07-14 NOTE — PROGRESS NOTES
Subjective:      Jose Ramon Leiva is a 62 y.o. female who presents today for follow up. Cardiovascular Review  The patient has hypertension, hyperlipidemia and prediabetes. Since last visit: no changes. She reports taking medications as instructed, no medication side effects noted, no TIA's, no chest pain on exertion, no dyspnea on exertion, no swelling of ankles. She is taking her BP 3x weekly and it has been normal.  Diet and Lifestyle: generally follows a low fat low cholesterol diet, generally follows a low sodium diet, no formal exercise but active during the day. Labs: reviewed and discussed with patient. BP Readings from Last 3 Encounters:   07/14/20 140/82   02/03/20 156/86   08/10/19 126/86     Right knee pain: she notes her left knee is not better, pain rated 7/10. She is taking tylenol extra strength every 4 hrs. Prediabetes: she is taking 1 tab 500mg metformin, she could not tolerate taking this BID, it made her heart race    Health Maintenance  Immunizations:   Influenza: she will plan on getting it this fall. Tetanus: up to date. Shingles: will send to pharmacy    Pneumonia: n/a. Cancer screening: Done by GYN  Cervical: reviewed guidelines, UTD, done by OBGYN, records requested. Breast: reviewed guidelines, UTD, done by OBGYN, records requested. Colon: reviewed guidelines, up to date. Bone Density: not up to date - ordered today      Patient Care Team:  Jennifer Colunga NP as PCP - General (Nurse Practitioner)  Jennifer Colunga NP as PCP - [facility] Empaneled Provider  Zandra Jamison MD as Surgeon (General Surgery)  Magda Warren MD (Cardiology)  Trip William MD as Consulting Provider [facility] [place])      The following sections were reviewed & updated as appropriate: PMH, PSH, FH, and SH.     Patient Active Problem List    Diagnosis Date Noted    Elevated hemoglobin A1c 04/16/2018    Obesity (BMI 30-39.9) 04/13/2018    Insomnia 07/20/2017    Diverticulitis 02/01/2017    Colovesical fistula 02/01/2017    Diverticulitis of intestine with abscess 09/13/2016    Hypovitaminosis D 04/27/2015    Microscopic hematuria 04/27/2015    Sigmoid diverticulitis 02/14/2014    Diverticulosis of large intestine with hemorrhage 02/14/2014     Class: Chronic    Anxiety disorder 02/14/2014     Class: Chronic    Dehydration 02/14/2014    Hyperlipidemia with target LDL less than 100 05/13/2013     Class: Chronic    Hypertension, goal below 140/90 10/16/2012     Class: Chronic     Current Outpatient Medications   Medication Sig Dispense Refill    varicella-zoster recombinant, PF, (Shingrix, PF,) 50 mcg/0.5 mL susr injection 0.5 mL by IntraMUSCular route once for 1 dose. 0.5 mL 0    lisinopril-hydroCHLOROthiazide (PRINZIDE, ZESTORETIC) 20-25 mg per tablet TAKE ONE TABLET BY MOUTH DAILY 90 Tab 1    metoprolol succinate (TOPROL-XL) 50 mg XL tablet TAKE ONE TABLET BY MOUTH DAILY 90 Tab 1    potassium chloride SR (KLOR-CON 10) 10 mEq tablet TAKE ONE TABLET BY MOUTH DAILY 90 Tab 1    ALPRAZolam (XANAX) 0.5 mg tablet TAKE ONE TABLET BY MOUTH EVERY NIGHT AT BEDTIME AS NEEDED FOR SLEEP 30 Tab 1    cholecalciferol, vitamin D3, 50 mcg (2,000 unit) tab Take 0.5 Tabs by mouth daily. 90 Tab 3    metFORMIN (GLUCOPHAGE) 500 mg tablet TAKE ONE TABLET BY MOUTH TWICE A DAY WITH MEALS (Patient taking differently: Hold dose if CrCl is below 30 mL/min.     Hold dose prior to contrast and for 48 hours after receiving contrast if any of the following apply:    - CrCl is below 60 mL/min,   - History of liver disease,   - Alcoholism,   - Heart failure,   - Iodinated contrast will be administered via intra-arterial.     Indications: patient is taking one dAILY) 60 Tab 3     Allergies   Allergen Reactions    Cephalexin Hives     Past Medical History:   Diagnosis Date    Diverticulosis of large intestine with hemorrhage 02/14/2014    distal sigmoid colon, mid-sigmoid colon, descending colon - colonoscopy 11/9/16    Hyperlipemia      5/2016; not on medication    Hypertension     Hypovitaminosis D 04/27/2015    level normal 5/2016    Microscopic hematuria 4/27/2015    Psychiatric disorder     anxiety     Past Surgical History:   Procedure Laterality Date    ABDOMEN SURGERY PROC UNLISTED  02/01/2017    laparoscopic sigmoid colectomy Dr. Pillai Scot HX BREAST BIOPSY Right     Stereo Bx 2007 - BENIGN    HX COLONOSCOPY  07/23/2015    HX GYN      ectopic pregnancy    HX HYSTERECTOMY  3/17/2005    TVH     Family History   Problem Relation Age of Onset    Hypertension Mother     Hypertension Father     Hypertension Sister     Breast Cancer Sister     Hypertension Brother     Hypertension Sister     Hypertension Sister     Hypertension Sister         Review of Systems    A comprehensive review of systems was negative except for that written in the HPI. Objective:     Visit Vitals  /82 (BP 1 Location: Left arm, BP Patient Position: Sitting)   Pulse 76   Temp 97.6 °F (36.4 °C) (Temporal)   Resp 16   Ht 5' 4\" (1.626 m)   Wt 172 lb 9.6 oz (78.3 kg)   LMP 02/12/2005   SpO2 99%   BMI 29.63 kg/m²     General:  Alert, cooperative, no distress, appears stated age. Head:  Normocephalic, without obvious abnormality, atraumatic. Eyes:  Conjunctivae/corneas clear. PERRL, EOMs intact. Fundi benign. Ears:  Normal TMs and external ear canals both ears. Throat: Deferred   Neck: Supple, symmetrical, trachea midline, no adenopathy, thyroid: no enlargement/tenderness/nodules, no carotid bruit and no JVD. Back:   Symmetric, no curvature. ROM normal. No CVA tenderness. Lungs:   Clear to auscultation bilaterally. Chest wall:  No tenderness or deformity. Heart:  Regular rate and rhythm, S1, S2 normal, no murmur, click, rub or gallop. Abdomen:   Soft, non-tender. Bowel sounds normal. No masses,  No organomegaly. Extremities: Extremities normal, atraumatic, no cyanosis.  Right knee edema noted, pain with palpation. Ganglion cyst noted to right wrist   Pulses: 2+ and symmetric all extremities. Skin: Skin color, texture, turgor normal. No rashes or lesions. Lymph nodes: Cervical, supraclavicular, and axillary nodes normal.   Neurologic: CNII-XII intact. Normal strength, sensation and reflexes throughout. Nursing note and vitals reviewed  Assessment/Plan:   1. Routine physical examination  - LIPID PANEL  - CBC WITH AUTOMATED DIFF    2. Hypertension, goal below 140/90  -stable at home, she will message reading  - METABOLIC PANEL, COMPREHENSIVE    3. Elevated hemoglobin A1c  - HEMOGLOBIN A1C W/O EAG    4. Hyperlipidemia with target LDL less than 100  - LIPID PANEL    5. Chronic pain of right knee  - REFERRAL TO ORTHOPEDICS    6. Ganglion cyst  - REFERRAL TO GENERAL SURGERY    7. Screening for osteoporosis  - DEXA BONE DENSITY STUDY AXIAL; Future    8. Need for shingles vaccine  - varicella-zoster recombinant, PF, (Shingrix, PF,) 50 mcg/0.5 mL susr injection; 0.5 mL by IntraMUSCular route once for 1 dose. Dispense: 0.5 mL; Refill: 0        Follow-up and Dispositions    · Return in about 6 months (around 1/14/2021) for Hypertension, Hypercholesterolemia. Advised her to call back or return to office if symptoms worsen/change/persist.  Discussed expected course/resolution/complications of diagnosis in detail with patient. Medication risks/benefits/costs/interactions/alternatives discussed with patient. She was given an after visit summary which includes diagnoses, current medications, & vitals. She expressed understanding with the diagnosis and plan.

## 2020-07-14 NOTE — PATIENT INSTRUCTIONS
Well Visit, Women 48 to 72: Care Instructions  Your Care Instructions     Physical exams can help you stay healthy. Your doctor has checked your overall health and may have suggested ways to take good care of yourself. He or she also may have recommended tests. At home, you can help prevent illness with healthy eating, regular exercise, and other steps. Follow-up care is a key part of your treatment and safety. Be sure to make and go to all appointments, and call your doctor if you are having problems. It's also a good idea to know your test results and keep a list of the medicines you take. How can you care for yourself at home? · Reach and stay at a healthy weight. This will lower your risk for many problems, such as obesity, diabetes, heart disease, and high blood pressure. · Get at least 30 minutes of exercise on most days of the week. Walking is a good choice. You also may want to do other activities, such as running, swimming, cycling, or playing tennis or team sports. · Do not smoke. Smoking can make health problems worse. If you need help quitting, talk to your doctor about stop-smoking programs and medicines. These can increase your chances of quitting for good. · Protect your skin from too much sun. When you're outdoors from 10 a.m. to 4 p.m., stay in the shade or cover up with clothing and a hat with a wide brim. Wear sunglasses that block UV rays. Even when it's cloudy, put broad-spectrum sunscreen (SPF 30 or higher) on any exposed skin. · See a dentist one or two times a year for checkups and to have your teeth cleaned. · Wear a seat belt in the car. Follow your doctor's advice about when to have certain tests. These tests can spot problems early. · Cholesterol. Your doctor will tell you how often to have this done based on your age, family history, or other things that can increase your risk for heart attack and stroke. · Blood pressure.  Have your blood pressure checked during a routine doctor visit. Your doctor will tell you how often to check your blood pressure based on your age, your blood pressure results, and other factors. · Mammogram. Ask your doctor how often you should have a mammogram, which is an X-ray of your breasts. A mammogram can spot breast cancer before it can be felt and when it is easiest to treat. · Pap test and pelvic exam. Ask your doctor how often you should have a Pap test. You may not need to have a Pap test as often as you used to. · Vision. Have your eyes checked every year or two or as often as your doctor suggests. Some experts recommend that you have yearly exams for glaucoma and other age-related eye problems starting at age 48. · Hearing. Tell your doctor if you notice any change in your hearing. You can have tests to find out how well you hear. · Diabetes. Ask your doctor whether you should have tests for diabetes. · Colorectal cancer. Your risk for colorectal cancer gets higher as you get older. Some experts say that adults should start regular screening at age 48 and stop at age 76. Others say to start before age 48 or continue after age 76. Talk with your doctor about your risk and when to start and stop screening. · Thyroid disease. Talk to your doctor about whether to have your thyroid checked as part of a regular physical exam. Women have an increased chance of a thyroid problem. · Osteoporosis. You should begin tests for bone density at age 72. If you are younger than 72, ask your doctor whether you have factors that may increase your risk for this disease. You may want to have this test before age 72. · Heart attack and stroke risk. At least every 4 to 6 years, you should have your risk for heart attack and stroke assessed. Your doctor uses factors such as your age, blood pressure, cholesterol, and whether you smoke or have diabetes to show what your risk for a heart attack or stroke is over the next 10 years.   When should you call for help?  Watch closely for changes in your health, and be sure to contact your doctor if you have any problems or symptoms that concern you. Where can you learn more? Go to http://jean carlos-rufus.info/  Enter [id number] in the search box to learn more about \"Well Visit, Women 50 to 72: Care Instructions. \"  Current as of: August 22, 2019               Content Version: 12.5  © 6708-9621 [employer], [employer] Care instructions adapted under license by [employer] (which disclaims liability or warranty for this information). If you have questions about a medical condition or this instruction, always ask your healthcare professional. [address] any warranty or liability for your use of this information.

## 2020-07-15 LAB
ALBUMIN SERPL-MCNC: 4.4 G/DL (ref 3.8–4.8)
ALBUMIN/GLOB SERPL: 2 {RATIO} (ref 1.2–2.2)
ALP SERPL-CCNC: 48 IU/L (ref 39–117)
ALT SERPL-CCNC: 18 IU/L (ref 0–32)
AST SERPL-CCNC: 14 IU/L (ref 0–40)
BASOPHILS # BLD AUTO: 0 X10E3/UL (ref 0–0.2)
BASOPHILS NFR BLD AUTO: 1 %
BILIRUB SERPL-MCNC: 0.3 MG/DL (ref 0–1.2)
BUN SERPL-MCNC: 11 MG/DL (ref 8–27)
BUN/CREAT SERPL: 17 (ref 12–28)
CALCIUM SERPL-MCNC: 9.9 MG/DL (ref 8.7–10.3)
CHLORIDE SERPL-SCNC: 99 MMOL/L (ref 96–106)
CHOLEST SERPL-MCNC: 208 MG/DL (ref 100–199)
CO2 SERPL-SCNC: 28 MMOL/L (ref 20–29)
CREAT SERPL-MCNC: 0.66 MG/DL (ref 0.57–1)
EOSINOPHIL # BLD AUTO: 0.1 X10E3/UL (ref 0–0.4)
EOSINOPHIL NFR BLD AUTO: 2 %
ERYTHROCYTE [DISTWIDTH] IN BLOOD BY AUTOMATED COUNT: 13.4 % (ref 11.7–15.4)
GLOBULIN SER CALC-MCNC: 2.2 G/DL (ref 1.5–4.5)
GLUCOSE SERPL-MCNC: 93 MG/DL (ref 65–99)
HBA1C MFR BLD: 5.9 % (ref 4.8–5.6)
HCT VFR BLD AUTO: 35.7 % (ref 34–46.6)
HDLC SERPL-MCNC: 52 MG/DL
HGB BLD-MCNC: 12 G/DL (ref 11.1–15.9)
IMM GRANULOCYTES # BLD AUTO: 0 X10E3/UL (ref 0–0.1)
IMM GRANULOCYTES NFR BLD AUTO: 0 %
LDLC SERPL CALC-MCNC: 138 MG/DL (ref 0–99)
LYMPHOCYTES # BLD AUTO: 2 X10E3/UL (ref 0.7–3.1)
LYMPHOCYTES NFR BLD AUTO: 45 %
MCH RBC QN AUTO: 28.8 PG (ref 26.6–33)
MCHC RBC AUTO-ENTMCNC: 33.6 G/DL (ref 31.5–35.7)
MCV RBC AUTO: 86 FL (ref 79–97)
MONOCYTES # BLD AUTO: 0.4 X10E3/UL (ref 0.1–0.9)
MONOCYTES NFR BLD AUTO: 10 %
NEUTROPHILS # BLD AUTO: 1.8 X10E3/UL (ref 1.4–7)
NEUTROPHILS NFR BLD AUTO: 42 %
PLATELET # BLD AUTO: 199 X10E3/UL (ref 150–450)
POTASSIUM SERPL-SCNC: 3.7 MMOL/L (ref 3.5–5.2)
PROT SERPL-MCNC: 6.6 G/DL (ref 6–8.5)
RBC # BLD AUTO: 4.16 X10E6/UL (ref 3.77–5.28)
SODIUM SERPL-SCNC: 141 MMOL/L (ref 134–144)
TRIGL SERPL-MCNC: 88 MG/DL (ref 0–149)
VLDLC SERPL CALC-MCNC: 18 MG/DL (ref 5–40)
WBC # BLD AUTO: 4.2 X10E3/UL (ref 3.4–10.8)

## 2020-07-21 ENCOUNTER — TELEPHONE (OUTPATIENT)
Dept: INTERNAL MEDICINE CLINIC | Age: 60
End: 2020-07-21

## 2020-07-21 NOTE — TELEPHONE ENCOUNTER
----- Message from Lizy Parsons NP sent at 7/15/2020  9:32 AM EDT -----  Please call patient and let her know her cholesterol is still elevated. There is some improvement but I recommend starting either red yeast rice (OTC supplement) or statin therapy. Is she open to either? If yes to statin I will send low dose crestor.

## 2020-07-28 DIAGNOSIS — E78.5 HYPERLIPIDEMIA WITH TARGET LDL LESS THAN 100: Primary | Chronic | ICD-10-CM

## 2020-07-28 RX ORDER — ROSUVASTATIN CALCIUM 5 MG/1
5 TABLET, COATED ORAL
Qty: 90 TAB | Refills: 1 | Status: SHIPPED | OUTPATIENT
Start: 2020-07-28 | End: 2021-02-08 | Stop reason: SDUPTHER

## 2020-07-28 NOTE — TELEPHONE ENCOUNTER
Patient returned telephone call today. She would like you to prescribe low dose of Crestor for her cholesterol as recommended.

## 2020-08-11 ENCOUNTER — OFFICE VISIT (OUTPATIENT)
Dept: SURGERY | Age: 60
End: 2020-08-11
Payer: COMMERCIAL

## 2020-08-11 VITALS
OXYGEN SATURATION: 95 % | HEIGHT: 64 IN | TEMPERATURE: 98.2 F | RESPIRATION RATE: 82 BRPM | HEART RATE: 89 BPM | DIASTOLIC BLOOD PRESSURE: 77 MMHG | SYSTOLIC BLOOD PRESSURE: 124 MMHG | BODY MASS INDEX: 29.02 KG/M2 | WEIGHT: 170 LBS

## 2020-08-11 DIAGNOSIS — M67.40 GANGLION CYST: Primary | ICD-10-CM

## 2020-08-11 PROCEDURE — 99202 OFFICE O/P NEW SF 15 MIN: CPT | Performed by: SURGERY

## 2020-08-11 NOTE — PROGRESS NOTES
1. Have you been to the ER, urgent care clinic since your last visit? Hospitalized since your last visit? No    2. Have you seen or consulted any other health care providers outside of the [address] [place] [address] since your last visit? Include any pap smears or colon screening.  No

## 2020-08-11 NOTE — PROGRESS NOTES
Nicola Coreas is a 62 y.o. female who is referred by Maria A Olivera NP for further evaluation of a subcutaneous mass on her RLE. Ms. Pito Berman tells me that she has had a subcutaneous mass on her right wrist/distal forearm for several years now. Associated discomfort. No drainage or bleeding. No decrease in the range of motion of her right wrist. Furthermore, Ms. Pito Berman believes that the mass has decreased in size. Found to have a ganglion cyst. She also has a trigger finger on her right hand. She has otherwise been in her usual state of health. Past Medical History:   Diagnosis Date    Diverticulosis of large intestine with hemorrhage 02/14/2014    distal sigmoid colon, mid-sigmoid colon, descending colon - colonoscopy 11/9/16    Ganglion cyst 8/11/2020    Hyperlipemia      5/2016; not on medication    Hypertension     Hypovitaminosis D 04/27/2015    level normal 5/2016    Microscopic hematuria 4/27/2015    Psychiatric disorder     anxiety     Past Surgical History:   Procedure Laterality Date    ABDOMEN SURGERY PROC UNLISTED  02/01/2017    laparoscopic sigmoid colectomy Dr. Kehinde Crespo HX BREAST BIOPSY Right     Stereo Bx 2007 - BENIGN    HX COLONOSCOPY  07/23/2015    HX GYN      ectopic pregnancy    HX HYSTERECTOMY  3/17/2005    TVH     Family History   Problem Relation Age of Onset    Hypertension Mother     Hypertension Father     Hypertension Sister     Breast Cancer Sister     Hypertension Brother     Hypertension Sister     Hypertension Sister     Hypertension Sister      Social History     Socioeconomic History    Marital status:      Spouse name: Not on file    Number of children: Not on file    Years of education: Not on file    Highest education level: Not on file   Occupational History    Occupation: caregiver   Tobacco Use    Smoking status: Never Smoker    Smokeless tobacco: Never Used   Substance and Sexual Activity    Alcohol use:  Yes Alcohol/week: 2.0 standard drinks     Types: 2 Glasses of wine per week     Comment: occassionally:     Drug use: Yes     Types: Marijuana     Comment: last used in December 2016 - per pt 2/1/17    Sexual activity: Yes     Partners: Male     Birth control/protection: Surgical     Review of systems negative except as noted. Review of Systems   Musculoskeletal:        No discomfort at site of ganglion cyst.      Physical Exam  Vitals signs reviewed. Constitutional:       General: She is not in acute distress. Appearance: Normal appearance. HENT:      Head: Normocephalic and atraumatic. Eyes:      General: No scleral icterus. Cardiovascular:      Rate and Rhythm: Normal rate and regular rhythm. Pulmonary:      Effort: Pulmonary effort is normal.   Abdominal:      General: There is no distension. Palpations: Abdomen is soft. Tenderness: There is no abdominal tenderness. Musculoskeletal: Normal range of motion. Arms:       Comments: Approx. 2cm x 3cm, well circumscribed subcutaneous mass. Clinically, this appears to be c/w a ganglion cyst.   Neurological:      Mental Status: She is alert. ASSESSMENT and PLAN  Explained to Ms. Colin Gee that the subcutaneous mass on her right wrist appears to be c/w a ganglion cyst. Will ask Hand Surgery to see who can also address her trigger finger. Follow up with Ms. Jennifer Min as scheduled. Will see as needed.        CC: Skiff, Paulla Lou, NP

## 2020-08-28 DIAGNOSIS — G47.00 INSOMNIA, UNSPECIFIED TYPE: ICD-10-CM

## 2020-08-31 RX ORDER — ALPRAZOLAM 0.5 MG/1
TABLET ORAL
Qty: 30 TAB | Refills: 0 | Status: SHIPPED | OUTPATIENT
Start: 2020-08-31 | End: 2021-02-08 | Stop reason: SDUPTHER

## 2020-09-01 ENCOUNTER — HOSPITAL ENCOUNTER (OUTPATIENT)
Dept: LAB | Age: 60
Discharge: HOME | End: 2020-09-01

## 2020-10-28 ENCOUNTER — HOSPITAL ENCOUNTER (OUTPATIENT)
Dept: BONE DENSITY | Age: 60
Discharge: HOME | End: 2020-10-28
Attending: NURSE PRACTITIONER
Payer: COMMERCIAL

## 2020-10-28 DIAGNOSIS — Z13.820 SCREENING FOR OSTEOPOROSIS: ICD-10-CM

## 2020-10-28 PROCEDURE — 77080 DXA BONE DENSITY AXIAL: CPT

## 2020-11-06 DIAGNOSIS — I10 ESSENTIAL HYPERTENSION WITH GOAL BLOOD PRESSURE LESS THAN 140/90: Chronic | ICD-10-CM

## 2020-11-06 RX ORDER — POTASSIUM CHLORIDE 750 MG/1
TABLET, FILM COATED, EXTENDED RELEASE ORAL
Qty: 90 TAB | Refills: 0 | Status: SHIPPED | OUTPATIENT
Start: 2020-11-06 | End: 2021-02-08 | Stop reason: SDUPTHER

## 2020-11-13 ENCOUNTER — TRANSCRIBE ORDER (OUTPATIENT)
Dept: SCHEDULING | Age: 60
End: 2020-11-13

## 2020-11-13 DIAGNOSIS — Z12.31 VISIT FOR SCREENING MAMMOGRAM: Primary | ICD-10-CM

## 2020-11-21 DIAGNOSIS — I10 ESSENTIAL HYPERTENSION WITH GOAL BLOOD PRESSURE LESS THAN 140/90: Chronic | ICD-10-CM

## 2020-11-23 RX ORDER — LISINOPRIL AND HYDROCHLOROTHIAZIDE 20; 25 MG/1; MG/1
TABLET ORAL
Qty: 90 TAB | Refills: 0 | Status: SHIPPED | OUTPATIENT
Start: 2020-11-23 | End: 2021-02-08 | Stop reason: SDUPTHER

## 2020-11-23 RX ORDER — METOPROLOL SUCCINATE 50 MG/1
TABLET, EXTENDED RELEASE ORAL
Qty: 90 TAB | Refills: 0 | Status: SHIPPED | OUTPATIENT
Start: 2020-11-23 | End: 2021-02-08 | Stop reason: SDUPTHER

## 2020-12-03 NOTE — ANCILLARY DISCHARGE INSTRUCTIONS
[facility] Physical Therapy [address] [address], [address] [facility], [address] [place] [address] Phone: [phone]  Fax: [phone] Discharge Summary  2-15 Patient name: Carla Gibbs  : 1960  Provider#: [id number] Referral source: Vitaliy Calvillo NP Medical/Treatment Diagnosis: Right knee pain [M25.561] Prior Hospitalization: see medical history Comorbidities: OA 
Prior Level of Function: complete 20 minutes of exercise seldom or never Medications: Verified on Patient Summary List 
  
Start of Care: 2019                                                                              Onset Date: 1 year ago Visits from Start of Care: 8     Missed Visits: 0 Reporting Period : 2019 to 2019 Progress towards goals / Updated goals: 
Long Term Goals: To be accomplished in 4-8 weeks: 
1) Pt will be able to Navigate one flight of stairs without pain. MET 
2) Pt will be able to Stand for >/= 30 minutes without pain. MET 
3) Pt will be able to Sit for >/= 60 minutes without pain. MET 
4) Pt will be able to Ambulate >/= 0.5 miles without pain. MET 
5) Pt will be able to squat to retrieve item from ground without increase of pain. MET 
6) Pt will be independent with HEP MET 
 
 
ASSESSMENT/SUMMARY OF CARE:  Mrs. Lucio Velazquez states that she was able to rake and shovel wood chips from a stump ground up in her yard and her knee did not hurt with the activity. She feels ready to continue on her own at this point. She has been instructed in specific HEP to continue on her own at this time. FOTO Functional Measure: Intake    Discharge 53 RECOMMENDATIONS: 
[x]Discontinue therapy: [x]Patient has reached or is progressing toward set goals []Patient is non-compliant or has abdicated 
    []Due to lack of appreciable progress towards set goals Maryam Macias, PT, DPT 2019 Infliximab Counseling:  I discussed with the patient the risks of infliximab including but not limited to myelosuppression, immunosuppression, autoimmune hepatitis, demyelinating diseases, lymphoma, and serious infections.  The patient understands that monitoring is required including a PPD at baseline and must alert us or the primary physician if symptoms of infection or other concerning signs are noted.

## 2021-02-08 ENCOUNTER — OFFICE VISIT (OUTPATIENT)
Dept: INTERNAL MEDICINE CLINIC | Age: 61
End: 2021-02-08
Payer: COMMERCIAL

## 2021-02-08 VITALS
HEIGHT: 64 IN | OXYGEN SATURATION: 97 % | RESPIRATION RATE: 16 BRPM | WEIGHT: 175.6 LBS | HEART RATE: 76 BPM | TEMPERATURE: 97.1 F | DIASTOLIC BLOOD PRESSURE: 86 MMHG | BODY MASS INDEX: 29.98 KG/M2 | SYSTOLIC BLOOD PRESSURE: 140 MMHG

## 2021-02-08 DIAGNOSIS — G47.00 INSOMNIA, UNSPECIFIED TYPE: ICD-10-CM

## 2021-02-08 DIAGNOSIS — I10 ESSENTIAL HYPERTENSION WITH GOAL BLOOD PRESSURE LESS THAN 140/90: Chronic | ICD-10-CM

## 2021-02-08 DIAGNOSIS — E78.5 HYPERLIPIDEMIA WITH TARGET LDL LESS THAN 100: Primary | Chronic | ICD-10-CM

## 2021-02-08 DIAGNOSIS — G89.29 CHRONIC PAIN OF RIGHT KNEE: ICD-10-CM

## 2021-02-08 DIAGNOSIS — E55.9 HYPOVITAMINOSIS D: ICD-10-CM

## 2021-02-08 DIAGNOSIS — R73.03 PREDIABETES: ICD-10-CM

## 2021-02-08 DIAGNOSIS — M25.561 CHRONIC PAIN OF RIGHT KNEE: ICD-10-CM

## 2021-02-08 LAB
ALBUMIN SERPL-MCNC: 4 G/DL (ref 3.5–5)
ALBUMIN/GLOB SERPL: 1.3 {RATIO} (ref 1.1–2.2)
ALP SERPL-CCNC: 54 U/L (ref 45–117)
ALT SERPL-CCNC: 23 U/L (ref 12–78)
ANION GAP SERPL CALC-SCNC: 5 MMOL/L (ref 5–15)
AST SERPL-CCNC: 13 U/L (ref 15–37)
BILIRUB SERPL-MCNC: 0.3 MG/DL (ref 0.2–1)
BUN SERPL-MCNC: 18 MG/DL (ref 6–20)
BUN/CREAT SERPL: 27 (ref 12–20)
CALCIUM SERPL-MCNC: 9.3 MG/DL (ref 8.5–10.1)
CHLORIDE SERPL-SCNC: 102 MMOL/L (ref 97–108)
CHOLEST SERPL-MCNC: 148 MG/DL
CO2 SERPL-SCNC: 32 MMOL/L (ref 21–32)
CREAT SERPL-MCNC: 0.67 MG/DL (ref 0.55–1.02)
GLOBULIN SER CALC-MCNC: 3.1 G/DL (ref 2–4)
GLUCOSE SERPL-MCNC: 92 MG/DL (ref 65–100)
HDLC SERPL-MCNC: 67 MG/DL
HDLC SERPL: 2.2 {RATIO} (ref 0–5)
LDLC SERPL CALC-MCNC: 70.6 MG/DL (ref 0–100)
LIPID PROFILE,FLP: NORMAL
POTASSIUM SERPL-SCNC: 3.7 MMOL/L (ref 3.5–5.1)
PROT SERPL-MCNC: 7.1 G/DL (ref 6.4–8.2)
SODIUM SERPL-SCNC: 139 MMOL/L (ref 136–145)
TRIGL SERPL-MCNC: 52 MG/DL (ref ?–150)
VLDLC SERPL CALC-MCNC: 10.4 MG/DL

## 2021-02-08 PROCEDURE — 99214 OFFICE O/P EST MOD 30 MIN: CPT | Performed by: NURSE PRACTITIONER

## 2021-02-08 RX ORDER — ROSUVASTATIN CALCIUM 5 MG/1
5 TABLET, COATED ORAL
Qty: 90 TAB | Refills: 1 | Status: SHIPPED | OUTPATIENT
Start: 2021-02-08

## 2021-02-08 RX ORDER — LISINOPRIL AND HYDROCHLOROTHIAZIDE 20; 25 MG/1; MG/1
1 TABLET ORAL DAILY
Qty: 90 TAB | Refills: 1 | Status: SHIPPED | OUTPATIENT
Start: 2021-02-08

## 2021-02-08 RX ORDER — ALPRAZOLAM 0.5 MG/1
TABLET ORAL
Qty: 30 TAB | Refills: 0 | Status: SHIPPED | OUTPATIENT
Start: 2021-02-08

## 2021-02-08 RX ORDER — POTASSIUM CHLORIDE 750 MG/1
10 TABLET, FILM COATED, EXTENDED RELEASE ORAL DAILY
Qty: 90 TAB | Refills: 1 | Status: SHIPPED | OUTPATIENT
Start: 2021-02-08

## 2021-02-08 RX ORDER — CHOLECALCIFEROL (VITAMIN D3) 125 MCG
1000 CAPSULE ORAL DAILY
Qty: 90 TAB | Refills: 3 | Status: SHIPPED | OUTPATIENT
Start: 2021-02-08

## 2021-02-08 RX ORDER — METFORMIN HYDROCHLORIDE 500 MG/1
TABLET ORAL
Qty: 180 TAB | Refills: 1 | Status: SHIPPED | OUTPATIENT
Start: 2021-02-08

## 2021-02-08 RX ORDER — METOPROLOL SUCCINATE 50 MG/1
50 TABLET, EXTENDED RELEASE ORAL DAILY
Qty: 90 TAB | Refills: 1 | Status: SHIPPED | OUTPATIENT
Start: 2021-02-08

## 2021-02-08 NOTE — PROGRESS NOTES
Subjective:      Aisha Rabago is a 56 y.o. female who presents today for follow up. Cardiovascular Review  The patient has hypertension and hyperlipidemia. She reports taking medications as instructed, no medication side effects noted, no TIA's, no chest pain on exertion, no dyspnea on exertion, no swelling of ankles, no palpitations. Diet and Lifestyle: generally follows a low fat low cholesterol diet, generally follows a low sodium diet, no formal exercise but active during the day. Labs: reviewed and discussed with patient. She notes NO problems with the crestor. She did not take her meds today  BP Readings from Last 3 Encounters:   02/08/21 (!) 140/86   08/11/20 124/77   07/14/20 140/82     Abdominal \"knot\": she states intermittently she will feel a \"knot\" to LUQ after eating. She denies pain, nausea, constipation, diarrhea. She denies symptoms at this time and states the knot comes and goes. Chronic leg pain: she was seen by Dr. Scooter Gann and told that she needs a right knee replacement. She had a steroid injection and notes that this did not help. She is having recurrent falls and has pain with walking. Body mass index is 30.14 kg/m².    Wt Readings from Last 3 Encounters:   02/08/21 175 lb 9.6 oz (79.7 kg)   08/11/20 170 lb (77.1 kg)   07/14/20 172 lb 9.6 oz (78.3 kg)         Patient Active Problem List    Diagnosis Date Noted    Ganglion cyst 08/11/2020    Elevated hemoglobin A1c 04/16/2018    Obesity (BMI 30-39.9) 04/13/2018    Insomnia 07/20/2017    Diverticulitis 02/01/2017    Colovesical fistula 02/01/2017    Diverticulitis of intestine with abscess 09/13/2016    Hypovitaminosis D 04/27/2015    Microscopic hematuria 04/27/2015    Sigmoid diverticulitis 02/14/2014    Diverticulosis of large intestine with hemorrhage 02/14/2014     Class: Chronic    Anxiety disorder 02/14/2014     Class: Chronic    Dehydration 02/14/2014    Hyperlipidemia with target LDL less than 100 05/13/2013     Class: Chronic    Hypertension, goal below 140/90 10/16/2012     Class: Chronic     Current Outpatient Medications   Medication Sig Dispense Refill    lisinopril-hydroCHLOROthiazide (PRINZIDE, ZESTORETIC) 20-25 mg per tablet TAKE ONE TABLET BY MOUTH DAILY 90 Tab 0    metoprolol succinate (TOPROL-XL) 50 mg XL tablet TAKE ONE TABLET BY MOUTH DAILY 90 Tab 0    potassium chloride SR (KLOR-CON 10) 10 mEq tablet TAKE ONE TABLET BY MOUTH DAILY 90 Tab 0    ALPRAZolam (XANAX) 0.5 mg tablet TAKE ONE TABLET BY MOUTH EVERY NIGHT AT BEDTIME AS NEEDED FOR SLEEP 30 Tab 0    rosuvastatin (CRESTOR) 5 mg tablet Take 1 Tab by mouth nightly. 90 Tab 1    metFORMIN (GLUCOPHAGE) 500 mg tablet TAKE ONE TABLET BY MOUTH TWICE A DAY WITH MEALS (Patient taking differently: Take 500 mg by mouth two (2) times daily (with meals). Hold dose if CrCl is below 30 mL/min. Hold dose prior to contrast and for 48 hours after receiving contrast if any of the following apply:    - CrCl is below 60 mL/min,   - History of liver disease,   - Alcoholism,   - Heart failure,   - Iodinated contrast will be administered via intra-arterial.     Indications: patient is taking one dAILY) 60 Tab 3    cholecalciferol, vitamin D3, 50 mcg (2,000 unit) tab Take 0.5 Tabs by mouth daily.  90 Tab 3     Allergies   Allergen Reactions    Cephalexin Hives     Past Medical History:   Diagnosis Date    Diverticulosis of large intestine with hemorrhage 02/14/2014    distal sigmoid colon, mid-sigmoid colon, descending colon - colonoscopy 11/9/16    Ganglion cyst 8/11/2020    Hyperlipemia      5/2016; not on medication    Hypertension     Hypovitaminosis D 04/27/2015    level normal 5/2016    Microscopic hematuria 4/27/2015    Psychiatric disorder     anxiety     Past Surgical History:   Procedure Laterality Date    HX BREAST BIOPSY Right     Stereo Bx 2007 - BENIGN    HX COLONOSCOPY  07/23/2015    HX GYN      ectopic pregnancy    HX [facility]ST[facility]CT[facility]Y  3/17/2005    [facility]    [facility] ABDOMEN SURGERY PROC UNLISTED  02/01/2017    laparoscopic sigmoid colectomy Dr. Kian Dorado        Review of Systems    A comprehensive review of systems was negative except for that written in the HPI. Objective:     Visit Vitals  BP (!) 140/86 (BP 1 Location: Right upper arm, BP Patient Position: Sitting, BP Cuff Size: Large adult)   Pulse 76   Temp 97.1 °F (36.2 °C) (Temporal)   Resp 16   Ht 5' 4\" (1.626 m)   Wt 175 lb 9.6 oz (79.7 kg)   LMP 02/12/2005   SpO2 97%   BMI 30.14 kg/m²     General appearance: alert, cooperative, no distress, appears stated age  Head: Normocephalic, without obvious abnormality, atraumatic  Eyes: negative  Heart: regular rate and rhythm, S1, S2 normal, no murmur, click, rub or gallop  Abdomen: soft, non-tender. Bowel sounds normal. No masses,  no organomegaly  Lungs: clear in all fields, no wheeze, ronchi, crackles. Extremities: extremities normal, atraumatic, no cyanosis or edema. Antalgic gait with limp favoring right leg  Pulses: 2+ and symmetric  Skin: Skin color, texture, turgor normal. No rashes or lesions  Neurologic: Grossly normal  Psych: appropriate mood, speech, affect    Nursing note and vitals reviewed  Assessment/Plan:   1. Hyperlipidemia with target LDL less than 100  - LIPID PANEL; Future  - rosuvastatin (CRESTOR) 5 mg tablet; Take 1 Tab by mouth nightly. Dispense: 90 Tab; Refill: 1    2. Essential hypertension with goal blood pressure less than 140/90  - BP not well controlled in office today but she has not taken her meds  - LIPID PANEL; Future  - METABOLIC PANEL, COMPREHENSIVE; Future  - potassium chloride SR (KLOR-CON 10) 10 mEq tablet; Take 1 Tab by mouth daily. Dispense: 90 Tab; Refill: 1  - metoprolol succinate (TOPROL-XL) 50 mg XL tablet; Take 1 Tab by mouth daily. Dispense: 90 Tab; Refill: 1  - lisinopril-hydroCHLOROthiazide (PRINZIDE, ZESTORETIC) 20-25 mg per tablet; Take 1 Tab by mouth daily.   Dispense: 90 Tab; Refill: 1    3. Hypovitaminosis D  - cholecalciferol, vitamin D3, 50 mcg (2,000 unit) tab; Take 0.5 Tabs by mouth daily. Dispense: 90 Tab; Refill: 3    4. Insomnia, unspecified type  - ALPRAZolam (XANAX) 0.5 mg tablet; Take 1 tab as needed for anxiety or to sleep. Do not take daily  Dispense: 30 Tab; Refill: 0    5. Prediabetes  - metFORMIN (GLUCOPHAGE) 500 mg tablet; TAKE ONE TABLET BY MOUTH TWICE A DAY WITH MEALS  Dispense: 180 Tab; Refill: 1    6. Chronic pain of right knee  - she will follow back up with        Follow-up and Dispositions    · Return in about 3 months (around 5/8/2021) for Hypertension. Advised her to call back or return to office if symptoms worsen/change/persist.  Discussed expected course/resolution/complications of diagnosis in detail with patient. Medication risks/benefits/costs/interactions/alternatives discussed with patient. She was given an after visit summary which includes diagnoses, current medications, & vitals. She expressed understanding with the diagnosis and plan.

## 2021-02-09 ENCOUNTER — TELEPHONE (OUTPATIENT)
Dept: INTERNAL MEDICINE CLINIC | Age: 61
End: 2021-02-09

## 2021-02-09 DIAGNOSIS — R73.03 PREDIABETES: Primary | ICD-10-CM

## 2021-02-09 NOTE — TELEPHONE ENCOUNTER
Oxana Schwartz (Self) [phone] (M)     Pt is requesting a call back regarding sven ordering her a blood glucose meter and test strips. She says that her ins would cover this for her.

## 2021-02-10 RX ORDER — INSULIN PUMP SYRINGE, 3 ML
EACH MISCELLANEOUS
Qty: 1 KIT | Refills: 0 | Status: SHIPPED | OUTPATIENT
Start: 2021-02-10

## 2021-02-10 RX ORDER — LANCETS
EACH MISCELLANEOUS
Qty: 100 EACH | Refills: 3 | Status: SHIPPED | OUTPATIENT
Start: 2021-02-10

## 2021-02-19 ENCOUNTER — TRANSCRIBE ORDER (OUTPATIENT)
Dept: REGISTRATION | Age: 61
End: 2021-02-19

## 2021-02-19 DIAGNOSIS — Z01.812 PRE-PROCEDURE LAB EXAM: Primary | ICD-10-CM

## 2021-02-23 ENCOUNTER — HOSPITAL ENCOUNTER (OUTPATIENT)
Dept: PREADMISSION TESTING | Age: 61
Discharge: HOME | End: 2021-02-23
Payer: COMMERCIAL

## 2021-02-23 VITALS
HEIGHT: 60 IN | RESPIRATION RATE: 16 BRPM | HEART RATE: 82 BPM | BODY MASS INDEX: 33.93 KG/M2 | SYSTOLIC BLOOD PRESSURE: 147 MMHG | WEIGHT: 172.84 LBS | DIASTOLIC BLOOD PRESSURE: 91 MMHG | TEMPERATURE: 98.3 F

## 2021-02-23 LAB
ABO + RH BLD: NORMAL
APPEARANCE UR: CLEAR
ATRIAL RATE: 80 BPM
BACTERIA URNS QL MICRO: ABNORMAL /HPF
BILIRUB UR QL: NEGATIVE
BLOOD GROUP ANTIBODIES SERPL: NORMAL
CALCULATED P AXIS, ECG09: 52 DEGREES
CALCULATED R AXIS, ECG10: -6 DEGREES
CALCULATED T AXIS, ECG11: 0 DEGREES
COLOR UR: ABNORMAL
DIAGNOSIS, 93000: NORMAL
EPITH CASTS URNS QL MICRO: ABNORMAL /LPF
ERYTHROCYTE [DISTWIDTH] IN BLOOD BY AUTOMATED COUNT: 13.9 % (ref 11.5–14.5)
EST. AVERAGE GLUCOSE BLD GHB EST-MCNC: 123 MG/DL
GLUCOSE UR STRIP.AUTO-MCNC: NEGATIVE MG/DL
HBA1C MFR BLD: 5.9 % (ref 4–5.6)
HCT VFR BLD AUTO: 38.5 % (ref 35–47)
HGB BLD-MCNC: 12.4 G/DL (ref 11.5–16)
HGB UR QL STRIP: ABNORMAL
INR PPP: 1 (ref 0.9–1.1)
KETONES UR QL STRIP.AUTO: NEGATIVE MG/DL
LEUKOCYTE ESTERASE UR QL STRIP.AUTO: NEGATIVE
MCH RBC QN AUTO: 28.6 PG (ref 26–34)
MCHC RBC AUTO-ENTMCNC: 32.2 G/DL (ref 30–36.5)
MCV RBC AUTO: 88.9 FL (ref 80–99)
NITRITE UR QL STRIP.AUTO: NEGATIVE
NRBC # BLD: 0 K/UL (ref 0–0.01)
NRBC BLD-RTO: 0 PER 100 WBC
P-R INTERVAL, ECG05: 166 MS
PH UR STRIP: 6.5 [PH] (ref 5–8)
PLATELET # BLD AUTO: 183 K/UL (ref 150–400)
PMV BLD AUTO: 11.3 FL (ref 8.9–12.9)
PROT UR STRIP-MCNC: NEGATIVE MG/DL
PROTHROMBIN TIME: 10.3 SEC (ref 9–11.1)
Q-T INTERVAL, ECG07: 382 MS
QRS DURATION, ECG06: 92 MS
QTC CALCULATION (BEZET), ECG08: 440 MS
RBC # BLD AUTO: 4.33 M/UL (ref 3.8–5.2)
RBC #/AREA URNS HPF: ABNORMAL /HPF (ref 0–5)
SP GR UR REFRACTOMETRY: 1.02 (ref 1–1.03)
SPECIMEN EXP DATE BLD: NORMAL
UA: UC IF INDICATED,UAUC: ABNORMAL
UROBILINOGEN UR QL STRIP.AUTO: 0.2 EU/DL (ref 0.2–1)
VENTRICULAR RATE, ECG03: 80 BPM
WBC # BLD AUTO: 4.9 K/UL (ref 3.6–11)
WBC URNS QL MICRO: ABNORMAL /HPF (ref 0–4)

## 2021-02-23 PROCEDURE — 86901 BLOOD TYPING SEROLOGIC RH(D): CPT

## 2021-02-23 PROCEDURE — 83036 HEMOGLOBIN GLYCOSYLATED A1C: CPT

## 2021-02-23 PROCEDURE — 85027 COMPLETE CBC AUTOMATED: CPT

## 2021-02-23 PROCEDURE — 81001 URINALYSIS AUTO W/SCOPE: CPT

## 2021-02-23 PROCEDURE — 93005 ELECTROCARDIOGRAM TRACING: CPT

## 2021-02-23 PROCEDURE — 85610 PROTHROMBIN TIME: CPT

## 2021-02-23 RX ORDER — DICLOFENAC SODIUM 10 MG/G
GEL TOPICAL AS NEEDED
COMMUNITY

## 2021-02-23 NOTE — PERIOP NOTES
Preoperative instructions reviewed with patient. Patient given  2 bottles of CHG soap. Instructions reviewed on use of CHG soap. Patient given SSI infection FAQS sheet, as well as a  MRSA/MSSA treatment instruction sheet  With an explanation to patient that they will be notified if treatment instructions need to be initiated. Patient was given the opportunity to ask questions on the information provided. ORTHOPEDIC PRE-OP ASSESSMENT AND PLANNING FORM SENT FOR SCANNING. DISCHARGE PLANNING AND HEALTH AND WELLNESS  FORMS PLACED IN FOLDER    Pt instructed that they will be scheduled for COVID 19 test 4 days prior to surgery. COVID instruction sheet and instructions given to PT. Pt instructed they must self quarantine between  COVID 19 test and day of surgery. Parking deck instructions  given to patient. Instructions reviewed with patient.

## 2021-02-24 LAB
BACTERIA SPEC CULT: NORMAL
BACTERIA SPEC CULT: NORMAL
SERVICE CMNT-IMP: NORMAL

## 2021-02-24 NOTE — PERIOP NOTES
PAT Nurse Practitioner   Pre-Operative Chart Review/Assessment:-ORTHOPEDIC                Patient Name:  Jewell Brown                                                           Age:   56 y.o.    :  1960     Today's Date:  2021     Date of PAT:   2021      Date of Surgery:    3/3/2021      Procedure(s):  Right Total Knee Arthroplasty     Surgeon:   Dr. Joshua Grewal                       PLAN:      1)  Medical Clearance:  Coreen Louis NP      2)  Cardiac Clearance:  Not requested. 3)  Diabetic Treatment Consult:  Not indicated. A1c-5.9      4)  Sleep Apnea evaluation:   DAMIR score of 4. Pt reports loud snoring that can be heard through a closed door, daytime fatigue and a diagnosis of HTN. Pt denies witnessed pauses in breathing. Referral sent to PCP for F/U and recommendations.       5) Treatment for MRSA/Staph Aureus:  Neg      6) Additional Concerns:  Marijuana use, HTN, T2DM                Vital Signs:         Vitals:    21 0847   BP: (!) 147/91   Pulse: 82   Resp: 16   Temp: 98.3 °F (36.8 °C)   Weight: 78.4 kg (172 lb 13.5 oz)   Height: 5' (1.524 m)   LMP: 2005            ____________________________________________  PAST MEDICAL HISTORY  Past Medical History:   Diagnosis Date    Anxiety     Arthritis     Diabetes ([address]     Diverticulosis of large intestine with hemorrhage 2014    distal sigmoid colon, mid-sigmoid colon, descending colon - colonoscopy 16    Ganglion cyst 2020    Hyperlipemia      2016; not on medication    Hypertension     Hypovitaminosis D 2015    level normal 2016    Microscopic hematuria 2015      ____________________________________________  PAST SURGICAL HISTORY  Past Surgical History:   Procedure Laterality Date    HX BREAST BIOPSY Right     Stereo Bx  - BENIGN    HX COLONOSCOPY  2015    HX GYN      ectopic pregnancy    HX HYSTERECTOMY  3/17/2005    [facility]    HX ORTHOPAEDIC Right 2020 ganglian cyst right wrist, right ring finger trigger finger release    [facility] ABDOMEN SURGERY PROC UNLISTED  02/01/2017    laparoscopic sigmoid colectomy Dr. Kristopher Rocha      ____________________________________________  HOME MEDICATIONS  Current Outpatient Medications   Medication Sig    diclofenac (Voltaren Arthritis Pain) 1 % gel Apply  to affected area as needed for Pain.  Blood-Glucose Meter monitoring kit Test glucose daily and as needed prediabetes    glucose blood VI test strips (ASCENSIA AUTODISC VI, ONE TOUCH ULTRA TEST VI) strip Test glucose daily and as needed prediabetes    lancets misc Test glucose daily and as needed prediabetes    potassium chloride SR (KLOR-CON 10) 10 mEq tablet Take 1 Tab by mouth daily.  metoprolol succinate (TOPROL-XL) 50 mg XL tablet Take 1 Tab by mouth daily. (Patient taking differently: Take 50 mg by mouth Every morning.)    rosuvastatin (CRESTOR) 5 mg tablet Take 1 Tab by mouth nightly.  cholecalciferol, vitamin D3, 50 mcg (2,000 unit) tab Take 0.5 Tabs by mouth daily. (Patient taking differently: Take 2,000 Units by mouth every other day.)    ALPRAZolam (XANAX) 0.5 mg tablet Take 1 tab as needed for anxiety or to sleep. Do not take daily    metFORMIN (GLUCOPHAGE) 500 mg tablet TAKE ONE TABLET BY MOUTH TWICE A DAY WITH MEALS    lisinopril-hydroCHLOROthiazide (PRINZIDE, ZESTORETIC) 20-25 mg per tablet Take 1 Tab by mouth daily. (Patient taking differently: Take 1 Tab by mouth Every morning.)     No current facility-administered medications for this encounter.       ____________________________________________  ALLERGIES  Allergies   Allergen Reactions    Cephalexin Hives      ____________________________________________  SOCIAL HISTORY  Social History     Tobacco Use    Smoking status: Never Smoker    Smokeless tobacco: Never Used   Substance Use Topics    Alcohol use:  Yes     Alcohol/week: 4.0 standard drinks     Types: 4 Glasses of wine per week     Comment: 4/wk      ____________________________________________        Labs:     Hospital Outpatient Visit on 02/23/2021   Component Date Value Ref Range Status    WBC 02/23/2021 4.9  3.6 - 11.0 K/uL Final    RBC 02/23/2021 4.33  3.80 - 5.20 M/uL Final    HGB 02/23/2021 12.4  11.5 - 16.0 g/dL Final    HCT 02/23/2021 38.5  35.0 - 47.0 % Final    MCV 02/23/2021 88.9  80.0 - 99.0 FL Final    MCH 02/23/2021 28.6  26.0 - 34.0 PG Final    MCHC 02/23/2021 32.2  30.0 - 36.5 g/dL Final    RDW 02/23/2021 13.9  11.5 - 14.5 % Final    PLATELET 13/78/3431 633  150 - 400 K/uL Final    MPV 02/23/2021 11.3  8.9 - 12.9 FL Final    NRBC 02/23/2021 0.0  0  WBC Final    ABSOLUTE NRBC 02/23/2021 0.00  0.00 - 0.01 K/uL Final    Crossmatch Expiration 02/23/2021 03/06/2021,2359   Final    ABO/Rh(D) 02/23/2021 A POSITIVE   Final    Antibody screen 02/23/2021 NEG   Final    INR 02/23/2021 1.0  0.9 - 1.1   Final    A single therapeutic range for Vit K antagonists may not be optimal for all indications - see June, 2008 issue of Chest, American College of Chest [employer] Evidence-Based Clinical Practice Guidelines, 8th Edition.     Prothrombin time 02/23/2021 10.3  9.0 - 11.1 sec Final    Color 02/23/2021 YELLOW/STRAW    Final    Color Reference Range: Straw, Yellow or Dark Yellow    Appearance 02/23/2021 CLEAR  CLEAR   Final    Specific gravity 02/23/2021 1.024  1.003 - 1.030   Final    pH (UA) 02/23/2021 6.5  5.0 - 8.0   Final    Protein 02/23/2021 Negative  NEG mg/dL Final    Glucose 02/23/2021 Negative  NEG mg/dL Final    Ketone 02/23/2021 Negative  NEG mg/dL Final    Bilirubin 02/23/2021 Negative  NEG   Final    Blood 02/23/2021 MODERATE* NEG   Final    Urobilinogen 02/23/2021 0.2  0.2 - 1.0 EU/dL Final    Nitrites 02/23/2021 Negative  NEG   Final    Leukocyte Esterase 02/23/2021 Negative  NEG   Final    WBC 02/23/2021 0-4  0 - 4 /hpf Final    RBC 02/23/2021 0-5  0 - 5 /hpf Final    Epithelial cells 02/23/2021 MODERATE* FEW /lpf Final    Epithelial cell category consists of squamous cells and /or transitional urothelial cells. Renal tubular cells, if present, are separately identified as such.     Bacteria 02/23/2021 2+* NEG /hpf Final    UA:UC IF INDICATED 02/23/2021 CULTURE NOT INDICATED BY UA RESULT  CNI   Final    Ventricular Rate 02/23/2021 80  BPM Final    Atrial Rate 02/23/2021 80  BPM Final    P-R Interval 02/23/2021 166  ms Final    QRS Duration 02/23/2021 92  ms Final    Q-T Interval 02/23/2021 382  ms Final    QTC Calculation (Bezet) 02/23/2021 440  ms Final    Calculated P Axis 02/23/2021 52  degrees Final    Calculated R Axis 02/23/2021 -6  degrees Final    Calculated T Axis 02/23/2021 0  degrees Final    Diagnosis 02/23/2021    Final                    Value:Sinus rhythm with sinus arrhythmia with occasional premature ventricular   complexes  Nonspecific ST and T wave abnormality  When compared with ECG of 11-JAN-2017 09:26,  premature ventricular complexes are now present  Nonspecific T wave abnormality no longer evident in Lateral leads  Confirmed by Jeffrey Restrepo M.D., Ankur Mead ([id number]) on 2/23/2021 4:40:01 PM      Hemoglobin A1c 02/23/2021 5.9* 4.0 - 5.6 % Final    Comment: NEW METHOD  PLEASE NOTE NEW REFERENCE RANGE  (NOTE)  HbA1C Interpretive Ranges  <5.7              Normal  5.7 - 6.4         Consider Prediabetes  >6.5              Consider Diabetes      Est. average glucose 02/23/2021 123  mg/dL Final    Special Requests: 02/23/2021 NO SPECIAL REQUESTS    Final    Culture result: 02/23/2021 MRSA NOT PRESENT    Final           Office Visit on 02/08/2021   Component Date Value Ref Range Status    Sodium 02/08/2021 139  136 - 145 mmol/L Final    Potassium 02/08/2021 3.7  3.5 - 5.1 mmol/L Final    Chloride 02/08/2021 102  97 - 108 mmol/L Final    CO2 02/08/2021 32  21 - 32 mmol/L Final    Anion gap 02/08/2021 5  5 - 15 mmol/L Final    Glucose 02/08/2021 92  65 - 100 mg/dL Final    BUN 02/08/2021 18  6 - 20 MG/DL Final    Creatinine 02/08/2021 0.67  0.55 - 1.02 MG/DL Final    BUN/Creatinine ratio 02/08/2021 27* 12 - 20   Final    GFR est AA 02/08/2021 >60  >60 ml/min/1.73m2 Final    GFR est non-AA 02/08/2021 >60  >60 ml/min/1.73m2 Final    Comment: Estimated GFR is calculated using the IDMS-traceable Modification of Diet in  Renal Disease (MDRD) Study equation, reported for both  Americans  (GFRAA) and non- Americans (GFRNA), and normalized to 1.73m2 body  surface area. The physician must decide which value applies to the patient.  Calcium 02/08/2021 9.3  8.5 - 10.1 MG/DL Final    Bilirubin, total 02/08/2021 0.3  0.2 - 1.0 MG/DL Final    ALT (SGPT) 02/08/2021 23  12 - 78 U/L Final    AST (SGOT) 02/08/2021 13* 15 - 37 U/L Final    Alk. phosphatase 02/08/2021 54  45 - 117 U/L Final    Protein, total 02/08/2021 7.1  6.4 - 8.2 g/dL Final    Albumin 02/08/2021 4.0  3.5 - 5.0 g/dL Final    Globulin 02/08/2021 3.1  2.0 - 4.0 g/dL Final    A-G Ratio 02/08/2021 1.3  1.1 - 2.2   Final    LIPID PROFILE 02/08/2021        Final    Cholesterol, total 02/08/2021 148  <200 MG/DL Final    Triglyceride 02/08/2021 52  <150 MG/DL Final    Comment: Based on NCEP-ATP III:  Triglycerides <150 mg/dL  is considered normal, 150-199  mg/dL  borderline high,  200-499 mg/dL high and  greater than or equal to 500  mg/dL very high.  HDL Cholesterol 02/08/2021 67  MG/DL Final    Comment: Based on NCEP ATP III, HDL Cholesterol <40 mg/dL is considered low and >60  mg/dL is elevated.       LDL, calculated 02/08/2021 70.6  0 - 100 MG/DL Final    Comment: Based on the NCEP-ATP: LDL-C concentrations are considered  optimal <100 mg/dL,  near optimal/above Normal 100-129 mg/dL Borderline High: 130-159, High: 160-189  mg/dL Very High: Greater than or equal to 190 mg/dL      VLDL, calculated 02/08/2021 10.4  MG/DL Final    CHOL/HDL Ratio 02/08/2021 2.2  0.0 - 5.0   Final       Skin:     Denies open wounds, cuts, sores, rashes or other areas of concern in PAT assessment.           Mamta Lucio, NP

## 2021-02-25 ENCOUNTER — HOSPITAL ENCOUNTER (OUTPATIENT)
Dept: MAMMOGRAPHY | Age: 61
Discharge: HOME | End: 2021-02-25
Attending: NURSE PRACTITIONER
Payer: COMMERCIAL

## 2021-02-25 DIAGNOSIS — Z12.31 VISIT FOR SCREENING MAMMOGRAM: ICD-10-CM

## 2021-02-25 PROCEDURE — 77067 SCR MAMMO BI INCL CAD: CPT

## 2021-02-27 ENCOUNTER — HOSPITAL ENCOUNTER (OUTPATIENT)
Dept: PREADMISSION TESTING | Age: 61
Discharge: HOME | End: 2021-02-27
Payer: COMMERCIAL

## 2021-02-27 DIAGNOSIS — Z01.812 PRE-PROCEDURE LAB EXAM: ICD-10-CM

## 2021-02-27 PROCEDURE — U0003 INFECTIOUS AGENT DETECTION BY NUCLEIC ACID (DNA OR RNA); SEVERE ACUTE RESPIRATORY SYNDROME CORONAVIRUS 2 (SARS-COV-2) (CORONAVIRUS DISEASE [COVID-19]), AMPLIFIED PROBE TECHNIQUE, MAKING USE OF HIGH THROUGHPUT TECHNOLOGIES AS DESCRIBED BY CMS-2020-01-R: HCPCS

## 2021-02-28 LAB — SARS-COV-2, COV2NT: NOT DETECTED

## 2021-03-02 ENCOUNTER — ANESTHESIA EVENT (OUTPATIENT)
Dept: SURGERY | Age: 61
DRG: 302 | End: 2021-03-02
Payer: COMMERCIAL

## 2021-03-02 PROBLEM — M17.11 PRIMARY LOCALIZED OSTEOARTHRITIS OF RIGHT KNEE: Status: ACTIVE | Noted: 2021-03-02

## 2021-03-02 NOTE — H&P
S: Vlad Puckett is 56 y.o female presenting with right knee pain. She was provided cortisone injection at previous evaluations with minimal relief. She has had progressive symptoms over the past several years. Her symptoms are now effecting her both daily activities and at night-time. She is a known well-controlled diabetic    O: Right knee reveals ROM 0-112. Varus deformity. Skin intact. No effusion. No instability. PT at medial joint space. X-ray reveals complete medial joint space narrowing. A: Right knee severe DJD    P: Reviewed with patient severity of arthritis. She has failed conservative treatment and is advised for total knee arthoplasty. We discussed total knee arthroplasty. We discussed all complications associated with the surgery including change of infection, DVT and PE. I explained use of ABX and anti-coagulants following surgery. We discussed course of physical therapy following surgery for rehabilitation. PROCEDURE: Right total knee replacement. Date of Surgery Update:  Lynsey Sanders was seen and examined. Past Medical History:   Diagnosis Date    Anxiety     Arthritis     Diabetes [address] [address]     Diverticulosis of large intestine with hemorrhage 02/14/2014    distal sigmoid colon, mid-sigmoid colon, descending colon - colonoscopy 11/9/16    Ganglion cyst 8/11/2020    Hyperlipemia      5/2016; not on medication    Hypertension     Hypovitaminosis D 04/27/2015    level normal 5/2016    Microscopic hematuria 4/27/2015     Prior to Admission Medications   Prescriptions Last Dose Informant Patient Reported? Taking? ALPRAZolam (XANAX) 0.5 mg tablet 3/3/2021 at 0330  No Yes   Sig: Take 1 tab as needed for anxiety or to sleep. Do not take daily   Blood-Glucose Meter monitoring kit   No No   Sig: Test glucose daily and as needed prediabetes   cholecalciferol, vitamin D3, 50 mcg (2,000 unit) tab 3/2/2021  No No   Sig: Take 0.5 Tabs by mouth daily.    Patient taking differently: Take 2,000 Units by mouth every other day. diclofenac (Voltaren Arthritis Pain) 1 % gel 2/10/2021  Yes No   Sig: Apply  to affected area as needed for Pain.   glucose blood VI test strips (ASCENSIA AUTODISC VI, ONE TOUCH ULTRA TEST VI) strip   No No   Sig: Test glucose daily and as needed prediabetes   lancets misc   No No   Sig: Test glucose daily and as needed prediabetes   lisinopril-hydroCHLOROthiazide (PRINZIDE, ZESTORETIC) 20-25 mg per tablet 3/2/2021 at Unknown time  No Yes   Sig: Take 1 Tab by mouth daily. Patient taking differently: Take 1 Tab by mouth Every morning. metFORMIN (GLUCOPHAGE) 500 mg tablet 3/2/2021 at Unknown time  No Yes   Sig: TAKE ONE TABLET BY MOUTH TWICE A DAY WITH MEALS   metoprolol succinate (TOPROL-XL) 50 mg XL tablet 3/3/2021 at 0630  No Yes   Sig: Take 1 Tab by mouth daily. Patient taking differently: Take 50 mg by mouth Every morning. potassium chloride SR (KLOR-CON 10) 10 mEq tablet 3/2/2021 at Unknown time  No Yes   Sig: Take 1 Tab by mouth daily. rosuvastatin (CRESTOR) 5 mg tablet 3/2/2021 at Unknown time  No Yes   Sig: Take 1 Tab by mouth nightly. Facility-Administered Medications: None      Allergy to:Cephalexin  Physical Examination: General appearance - alert, well appearing, and in no distress  History and physical has been reviewed. The patient has been examined.  There have been no significant clinical changes since the completion of the originally dated History and Physical.    Signed By: Graeme Halsted, PA-C     March 3, 2021 9:59 AM

## 2021-03-03 ENCOUNTER — HOSPITAL ENCOUNTER (INPATIENT)
Age: 61
LOS: 3 days | Discharge: HOME HEALTH | DRG: 302 | End: 2021-03-06
Attending: ORTHOPAEDIC SURGERY | Admitting: ORTHOPAEDIC SURGERY
Payer: COMMERCIAL

## 2021-03-03 ENCOUNTER — APPOINTMENT (OUTPATIENT)
Dept: GENERAL RADIOLOGY | Age: 61
DRG: 302 | End: 2021-03-03
Attending: PHYSICIAN ASSISTANT
Payer: COMMERCIAL

## 2021-03-03 ENCOUNTER — ANESTHESIA (OUTPATIENT)
Dept: SURGERY | Age: 61
DRG: 302 | End: 2021-03-03
Payer: COMMERCIAL

## 2021-03-03 DIAGNOSIS — Z96.659 STATUS POST KNEE REPLACEMENT, UNSPECIFIED LATERALITY: Primary | ICD-10-CM

## 2021-03-03 LAB
GLUCOSE BLD STRIP.AUTO-MCNC: 113 MG/DL (ref 65–100)
GLUCOSE BLD STRIP.AUTO-MCNC: 184 MG/DL (ref 65–100)
GLUCOSE BLD STRIP.AUTO-MCNC: 80 MG/DL (ref 65–100)
GLUCOSE BLD STRIP.AUTO-MCNC: 81 MG/DL (ref 65–100)
SERVICE CMNT-IMP: ABNORMAL
SERVICE CMNT-IMP: ABNORMAL
SERVICE CMNT-IMP: NORMAL
SERVICE CMNT-IMP: NORMAL

## 2021-03-03 PROCEDURE — 76210000016 HC OR PH I REC 1 TO 1.5 HR: Performed by: ORTHOPAEDIC SURGERY

## 2021-03-03 PROCEDURE — 77030000032 HC CUF TRNQT ZIMM -B: Performed by: ORTHOPAEDIC SURGERY

## 2021-03-03 PROCEDURE — C1713 ANCHOR/SCREW BN/BN,TIS/BN: HCPCS | Performed by: ORTHOPAEDIC SURGERY

## 2021-03-03 PROCEDURE — 65270000029 HC RM PRIVATE

## 2021-03-03 PROCEDURE — 77030012935 HC DRSG AQUACEL BMS -B: Performed by: ORTHOPAEDIC SURGERY

## 2021-03-03 PROCEDURE — 77030014077 HC TOWER MX CEM J&J -C: Performed by: ORTHOPAEDIC SURGERY

## 2021-03-03 PROCEDURE — 77030003601 HC NDL NRV BLK BBMI -A

## 2021-03-03 PROCEDURE — 73560 X-RAY EXAM OF KNEE 1 OR 2: CPT

## 2021-03-03 PROCEDURE — 77030008462 HC STPLR SKN PROX J&J -A: Performed by: ORTHOPAEDIC SURGERY

## 2021-03-03 PROCEDURE — 74011000250 HC RX REV CODE- 250: Performed by: PHYSICIAN ASSISTANT

## 2021-03-03 PROCEDURE — 74011000250 HC RX REV CODE- 250: Performed by: ORTHOPAEDIC SURGERY

## 2021-03-03 PROCEDURE — 77030006835 HC BLD SAW SAG STRY -B: Performed by: ORTHOPAEDIC SURGERY

## 2021-03-03 PROCEDURE — 74011250637 HC RX REV CODE- 250/637: Performed by: ANESTHESIOLOGY

## 2021-03-03 PROCEDURE — 82962 GLUCOSE BLOOD TEST: CPT

## 2021-03-03 PROCEDURE — 74011250636 HC RX REV CODE- 250/636: Performed by: NURSE ANESTHETIST, CERTIFIED REGISTERED

## 2021-03-03 PROCEDURE — C1776 JOINT DEVICE (IMPLANTABLE): HCPCS | Performed by: ORTHOPAEDIC SURGERY

## 2021-03-03 PROCEDURE — 77030040922 HC BLNKT HYPOTHRM STRY -A

## 2021-03-03 PROCEDURE — 77030031139 HC SUT VCRL2 J&J -A: Performed by: ORTHOPAEDIC SURGERY

## 2021-03-03 PROCEDURE — 74011250636 HC RX REV CODE- 250/636: Performed by: ANESTHESIOLOGY

## 2021-03-03 PROCEDURE — 2709999900 HC NON-CHARGEABLE SUPPLY

## 2021-03-03 PROCEDURE — 76060000036 HC ANESTHESIA 2.5 TO 3 HR: Performed by: ORTHOPAEDIC SURGERY

## 2021-03-03 PROCEDURE — 0SRC0J9 REPLACEMENT OF RIGHT KNEE JOINT WITH SYNTHETIC SUBSTITUTE, CEMENTED, OPEN APPROACH: ICD-10-PCS | Performed by: ORTHOPAEDIC SURGERY

## 2021-03-03 PROCEDURE — 74011000258 HC RX REV CODE- 258: Performed by: PHYSICIAN ASSISTANT

## 2021-03-03 PROCEDURE — 77030040361 HC SLV COMPR DVT MDII -B: Performed by: ORTHOPAEDIC SURGERY

## 2021-03-03 PROCEDURE — 97161 PT EVAL LOW COMPLEX 20 MIN: CPT

## 2021-03-03 PROCEDURE — 74011250636 HC RX REV CODE- 250/636: Performed by: PHYSICIAN ASSISTANT

## 2021-03-03 PROCEDURE — C9290 INJ, BUPIVACAINE LIPOSOME: HCPCS | Performed by: PHYSICIAN ASSISTANT

## 2021-03-03 PROCEDURE — 77030039497 HC CST PAD STERILE CHCS -A: Performed by: ORTHOPAEDIC SURGERY

## 2021-03-03 PROCEDURE — 76010000172 HC OR TIME 2.5 TO 3 HR INTENSV-TIER 1: Performed by: ORTHOPAEDIC SURGERY

## 2021-03-03 PROCEDURE — 74011000250 HC RX REV CODE- 250: Performed by: ANESTHESIOLOGY

## 2021-03-03 PROCEDURE — 77030005513 HC CATH URETH FOL11 MDII -B: Performed by: ORTHOPAEDIC SURGERY

## 2021-03-03 PROCEDURE — 77030027138 HC INCENT SPIROMETER -A

## 2021-03-03 PROCEDURE — 74011636637 HC RX REV CODE- 636/637: Performed by: PHYSICIAN ASSISTANT

## 2021-03-03 PROCEDURE — 74011250636 HC RX REV CODE- 250/636

## 2021-03-03 PROCEDURE — 2709999900 HC NON-CHARGEABLE SUPPLY: Performed by: ORTHOPAEDIC SURGERY

## 2021-03-03 PROCEDURE — 97116 GAIT TRAINING THERAPY: CPT

## 2021-03-03 PROCEDURE — 77030028907 HC WRP KNEE WO BGS SOLM -B

## 2021-03-03 PROCEDURE — 77030018673: Performed by: ORTHOPAEDIC SURGERY

## 2021-03-03 PROCEDURE — 74011250637 HC RX REV CODE- 250/637: Performed by: PHYSICIAN ASSISTANT

## 2021-03-03 PROCEDURE — 77030035236 HC SUT PDS STRATFX BARB J&J -B: Performed by: ORTHOPAEDIC SURGERY

## 2021-03-03 DEVICE — ATTUNE KNEE SYSTEM TIBIAL INSERT FIXED BEARING POSTERIOR STABILIZED 5 10MM AOX
Type: IMPLANTABLE DEVICE | Site: KNEE | Status: FUNCTIONAL
Brand: ATTUNE

## 2021-03-03 DEVICE — KNEE K1 TOT HEMI STD CEM IMPL CAPPED SYNTHES: Type: IMPLANTABLE DEVICE | Status: FUNCTIONAL

## 2021-03-03 DEVICE — ATTUNE KNEE SYSTEM FEMORAL POSTERIOR STABILIZED NARROW SIZE 5N RIGHT CEMENTED
Type: IMPLANTABLE DEVICE | Site: KNEE | Status: FUNCTIONAL
Brand: ATTUNE

## 2021-03-03 DEVICE — SMARTSET GHV GENTAMICIN HIGH VISCOSITY BONE CEMENT 40G
Type: IMPLANTABLE DEVICE | Site: KNEE | Status: FUNCTIONAL
Brand: SMARTSET

## 2021-03-03 DEVICE — ATTUNE KNEE SYSTEM TIBIAL BASE FIXED BEARING SIZE 4 CEMENTED
Type: IMPLANTABLE DEVICE | Site: KNEE | Status: FUNCTIONAL
Brand: ATTUNE

## 2021-03-03 DEVICE — ATTUNE PATELLA MEDIALIZED DOME 35MM CEMENTED AOX
Type: IMPLANTABLE DEVICE | Site: KNEE | Status: FUNCTIONAL
Brand: ATTUNE

## 2021-03-03 RX ORDER — LIDOCAINE HYDROCHLORIDE 10 MG/ML
INJECTION, SOLUTION EPIDURAL; INFILTRATION; INTRACAUDAL; PERINEURAL
Status: COMPLETED | OUTPATIENT
Start: 2021-03-03 | End: 2021-03-03

## 2021-03-03 RX ORDER — MIDAZOLAM HYDROCHLORIDE 1 MG/ML
1 INJECTION, SOLUTION INTRAMUSCULAR; INTRAVENOUS AS NEEDED
Status: DISCONTINUED | OUTPATIENT
Start: 2021-03-03 | End: 2021-03-03 | Stop reason: HOSPADM

## 2021-03-03 RX ORDER — HYDROCODONE BITARTRATE AND ACETAMINOPHEN 5; 325 MG/1; MG/1
1 TABLET ORAL AS NEEDED
Status: DISCONTINUED | OUTPATIENT
Start: 2021-03-03 | End: 2021-03-03 | Stop reason: HOSPADM

## 2021-03-03 RX ORDER — HYDROMORPHONE HYDROCHLORIDE 1 MG/ML
0.2 INJECTION, SOLUTION INTRAMUSCULAR; INTRAVENOUS; SUBCUTANEOUS
Status: DISCONTINUED | OUTPATIENT
Start: 2021-03-03 | End: 2021-03-03 | Stop reason: HOSPADM

## 2021-03-03 RX ORDER — SODIUM CHLORIDE, SODIUM LACTATE, POTASSIUM CHLORIDE, CALCIUM CHLORIDE 600; 310; 30; 20 MG/100ML; MG/100ML; MG/100ML; MG/100ML
1000 INJECTION, SOLUTION INTRAVENOUS CONTINUOUS
Status: DISCONTINUED | OUTPATIENT
Start: 2021-03-03 | End: 2021-03-03 | Stop reason: HOSPADM

## 2021-03-03 RX ORDER — POTASSIUM CHLORIDE 750 MG/1
10 TABLET, FILM COATED, EXTENDED RELEASE ORAL DAILY
Status: DISCONTINUED | OUTPATIENT
Start: 2021-03-04 | End: 2021-03-06 | Stop reason: HOSPADM

## 2021-03-03 RX ORDER — AMOXICILLIN 250 MG
1 CAPSULE ORAL 2 TIMES DAILY
Status: DISCONTINUED | OUTPATIENT
Start: 2021-03-03 | End: 2021-03-06 | Stop reason: HOSPADM

## 2021-03-03 RX ORDER — ONDANSETRON 4 MG/1
4 TABLET, ORALLY DISINTEGRATING ORAL
Status: DISCONTINUED | OUTPATIENT
Start: 2021-03-03 | End: 2021-03-05

## 2021-03-03 RX ORDER — ACETAMINOPHEN 325 MG/1
650 TABLET ORAL ONCE
Status: COMPLETED | OUTPATIENT
Start: 2021-03-03 | End: 2021-03-03

## 2021-03-03 RX ORDER — MORPHINE SULFATE 2 MG/ML
2 INJECTION, SOLUTION INTRAMUSCULAR; INTRAVENOUS
Status: DISCONTINUED | OUTPATIENT
Start: 2021-03-03 | End: 2021-03-03 | Stop reason: HOSPADM

## 2021-03-03 RX ORDER — SODIUM CHLORIDE 9 MG/ML
25 INJECTION, SOLUTION INTRAVENOUS CONTINUOUS
Status: DISCONTINUED | OUTPATIENT
Start: 2021-03-03 | End: 2021-03-03 | Stop reason: HOSPADM

## 2021-03-03 RX ORDER — INSULIN LISPRO 100 [IU]/ML
INJECTION, SOLUTION INTRAVENOUS; SUBCUTANEOUS
Status: DISCONTINUED | OUTPATIENT
Start: 2021-03-03 | End: 2021-03-06 | Stop reason: HOSPADM

## 2021-03-03 RX ORDER — MAGNESIUM SULFATE 100 %
4 CRYSTALS MISCELLANEOUS AS NEEDED
Status: DISCONTINUED | OUTPATIENT
Start: 2021-03-03 | End: 2021-03-06 | Stop reason: HOSPADM

## 2021-03-03 RX ORDER — FACIAL-BODY WIPES
10 EACH TOPICAL DAILY PRN
Status: DISCONTINUED | OUTPATIENT
Start: 2021-03-05 | End: 2021-03-06 | Stop reason: HOSPADM

## 2021-03-03 RX ORDER — PROPOFOL 10 MG/ML
INJECTION, EMULSION INTRAVENOUS
Status: DISCONTINUED | OUTPATIENT
Start: 2021-03-03 | End: 2021-03-03 | Stop reason: HOSPADM

## 2021-03-03 RX ORDER — SODIUM CHLORIDE 9 MG/ML
125 INJECTION, SOLUTION INTRAVENOUS CONTINUOUS
Status: DISPENSED | OUTPATIENT
Start: 2021-03-03 | End: 2021-03-04

## 2021-03-03 RX ORDER — ROSUVASTATIN CALCIUM 10 MG/1
5 TABLET, COATED ORAL
Status: DISCONTINUED | OUTPATIENT
Start: 2021-03-03 | End: 2021-03-06 | Stop reason: HOSPADM

## 2021-03-03 RX ORDER — OXYCODONE HYDROCHLORIDE 5 MG/1
5 TABLET ORAL
Status: DISCONTINUED | OUTPATIENT
Start: 2021-03-03 | End: 2021-03-05

## 2021-03-03 RX ORDER — ALPRAZOLAM 0.25 MG/1
0.5 TABLET ORAL
Status: DISCONTINUED | OUTPATIENT
Start: 2021-03-03 | End: 2021-03-06 | Stop reason: HOSPADM

## 2021-03-03 RX ORDER — POLYETHYLENE GLYCOL 3350 17 G/17G
17 POWDER, FOR SOLUTION ORAL DAILY
Status: DISCONTINUED | OUTPATIENT
Start: 2021-03-04 | End: 2021-03-06 | Stop reason: HOSPADM

## 2021-03-03 RX ORDER — SODIUM CHLORIDE 0.9 % (FLUSH) 0.9 %
5-40 SYRINGE (ML) INJECTION EVERY 8 HOURS
Status: DISCONTINUED | OUTPATIENT
Start: 2021-03-03 | End: 2021-03-06 | Stop reason: HOSPADM

## 2021-03-03 RX ORDER — TRANEXAMIC ACID 100 MG/ML
INJECTION, SOLUTION INTRAVENOUS AS NEEDED
Status: DISCONTINUED | OUTPATIENT
Start: 2021-03-03 | End: 2021-03-03 | Stop reason: HOSPADM

## 2021-03-03 RX ORDER — OXYCODONE HYDROCHLORIDE 5 MG/1
10 TABLET ORAL
Status: DISCONTINUED | OUTPATIENT
Start: 2021-03-03 | End: 2021-03-05

## 2021-03-03 RX ORDER — ROPIVACAINE HYDROCHLORIDE 5 MG/ML
30 INJECTION, SOLUTION EPIDURAL; INFILTRATION; PERINEURAL AS NEEDED
Status: DISCONTINUED | OUTPATIENT
Start: 2021-03-03 | End: 2021-03-03 | Stop reason: HOSPADM

## 2021-03-03 RX ORDER — ASPIRIN 325 MG
325 TABLET, DELAYED RELEASE (ENTERIC COATED) ORAL 2 TIMES DAILY
Status: DISCONTINUED | OUTPATIENT
Start: 2021-03-03 | End: 2021-03-06 | Stop reason: HOSPADM

## 2021-03-03 RX ORDER — CELECOXIB 200 MG/1
200 CAPSULE ORAL ONCE
Status: COMPLETED | OUTPATIENT
Start: 2021-03-03 | End: 2021-03-03

## 2021-03-03 RX ORDER — ONDANSETRON 2 MG/ML
INJECTION INTRAMUSCULAR; INTRAVENOUS AS NEEDED
Status: DISCONTINUED | OUTPATIENT
Start: 2021-03-03 | End: 2021-03-03 | Stop reason: HOSPADM

## 2021-03-03 RX ORDER — CELECOXIB 200 MG/1
200 CAPSULE ORAL 2 TIMES DAILY
Status: DISCONTINUED | OUTPATIENT
Start: 2021-03-03 | End: 2021-03-06 | Stop reason: HOSPADM

## 2021-03-03 RX ORDER — METFORMIN HYDROCHLORIDE 500 MG/1
500 TABLET ORAL 2 TIMES DAILY WITH MEALS
Status: DISCONTINUED | OUTPATIENT
Start: 2021-03-03 | End: 2021-03-03

## 2021-03-03 RX ORDER — PROPOFOL 10 MG/ML
INJECTION, EMULSION INTRAVENOUS AS NEEDED
Status: DISCONTINUED | OUTPATIENT
Start: 2021-03-03 | End: 2021-03-03 | Stop reason: HOSPADM

## 2021-03-03 RX ORDER — HYDROMORPHONE HYDROCHLORIDE 1 MG/ML
0.5 INJECTION, SOLUTION INTRAMUSCULAR; INTRAVENOUS; SUBCUTANEOUS
Status: ACTIVE | OUTPATIENT
Start: 2021-03-03 | End: 2021-03-04

## 2021-03-03 RX ORDER — HYDROXYZINE HYDROCHLORIDE 10 MG/1
10 TABLET, FILM COATED ORAL
Status: DISCONTINUED | OUTPATIENT
Start: 2021-03-03 | End: 2021-03-06 | Stop reason: HOSPADM

## 2021-03-03 RX ORDER — DEXAMETHASONE SODIUM PHOSPHATE 4 MG/ML
INJECTION, SOLUTION INTRA-ARTICULAR; INTRALESIONAL; INTRAMUSCULAR; INTRAVENOUS; SOFT TISSUE AS NEEDED
Status: DISCONTINUED | OUTPATIENT
Start: 2021-03-03 | End: 2021-03-03 | Stop reason: HOSPADM

## 2021-03-03 RX ORDER — FENTANYL CITRATE 50 UG/ML
25 INJECTION, SOLUTION INTRAMUSCULAR; INTRAVENOUS
Status: DISCONTINUED | OUTPATIENT
Start: 2021-03-03 | End: 2021-03-03 | Stop reason: HOSPADM

## 2021-03-03 RX ORDER — SODIUM CHLORIDE 0.9 % (FLUSH) 0.9 %
5-40 SYRINGE (ML) INJECTION AS NEEDED
Status: DISCONTINUED | OUTPATIENT
Start: 2021-03-03 | End: 2021-03-06 | Stop reason: HOSPADM

## 2021-03-03 RX ORDER — METOPROLOL SUCCINATE 50 MG/1
50 TABLET, EXTENDED RELEASE ORAL DAILY
Status: DISCONTINUED | OUTPATIENT
Start: 2021-03-04 | End: 2021-03-06 | Stop reason: HOSPADM

## 2021-03-03 RX ORDER — ACETAMINOPHEN 325 MG/1
650 TABLET ORAL EVERY 6 HOURS
Status: DISCONTINUED | OUTPATIENT
Start: 2021-03-03 | End: 2021-03-06 | Stop reason: HOSPADM

## 2021-03-03 RX ORDER — DIPHENHYDRAMINE HYDROCHLORIDE 50 MG/ML
12.5 INJECTION, SOLUTION INTRAMUSCULAR; INTRAVENOUS AS NEEDED
Status: DISCONTINUED | OUTPATIENT
Start: 2021-03-03 | End: 2021-03-03 | Stop reason: HOSPADM

## 2021-03-03 RX ORDER — METFORMIN HYDROCHLORIDE 500 MG/1
500 TABLET ORAL 2 TIMES DAILY WITH MEALS
Status: DISCONTINUED | OUTPATIENT
Start: 2021-03-04 | End: 2021-03-06 | Stop reason: HOSPADM

## 2021-03-03 RX ORDER — DEXTROSE 50 % IN WATER (D50W) INTRAVENOUS SYRINGE
12.5-25 AS NEEDED
Status: DISCONTINUED | OUTPATIENT
Start: 2021-03-03 | End: 2021-03-06 | Stop reason: HOSPADM

## 2021-03-03 RX ORDER — GLYCOPYRROLATE 0.2 MG/ML
0.2 INJECTION INTRAMUSCULAR; INTRAVENOUS
Status: COMPLETED | OUTPATIENT
Start: 2021-03-03 | End: 2021-03-03

## 2021-03-03 RX ORDER — MIDAZOLAM HYDROCHLORIDE 1 MG/ML
0.5 INJECTION, SOLUTION INTRAMUSCULAR; INTRAVENOUS
Status: DISCONTINUED | OUTPATIENT
Start: 2021-03-03 | End: 2021-03-03 | Stop reason: HOSPADM

## 2021-03-03 RX ORDER — NALOXONE HYDROCHLORIDE 0.4 MG/ML
0.4 INJECTION, SOLUTION INTRAMUSCULAR; INTRAVENOUS; SUBCUTANEOUS AS NEEDED
Status: DISCONTINUED | OUTPATIENT
Start: 2021-03-03 | End: 2021-03-06 | Stop reason: HOSPADM

## 2021-03-03 RX ORDER — BUPIVACAINE HYDROCHLORIDE 5 MG/ML
INJECTION, SOLUTION EPIDURAL; INTRACAUDAL
Status: COMPLETED | OUTPATIENT
Start: 2021-03-03 | End: 2021-03-03

## 2021-03-03 RX ORDER — FENTANYL CITRATE 50 UG/ML
50 INJECTION, SOLUTION INTRAMUSCULAR; INTRAVENOUS AS NEEDED
Status: DISCONTINUED | OUTPATIENT
Start: 2021-03-03 | End: 2021-03-03 | Stop reason: HOSPADM

## 2021-03-03 RX ORDER — FAMOTIDINE 20 MG/1
20 TABLET, FILM COATED ORAL
Status: DISCONTINUED | OUTPATIENT
Start: 2021-03-03 | End: 2021-03-06 | Stop reason: HOSPADM

## 2021-03-03 RX ORDER — ALBUTEROL SULFATE 0.83 MG/ML
2.5 SOLUTION RESPIRATORY (INHALATION) AS NEEDED
Status: DISCONTINUED | OUTPATIENT
Start: 2021-03-03 | End: 2021-03-03 | Stop reason: HOSPADM

## 2021-03-03 RX ORDER — LIDOCAINE HYDROCHLORIDE 10 MG/ML
0.1 INJECTION, SOLUTION EPIDURAL; INFILTRATION; INTRACAUDAL; PERINEURAL AS NEEDED
Status: DISCONTINUED | OUTPATIENT
Start: 2021-03-03 | End: 2021-03-03 | Stop reason: HOSPADM

## 2021-03-03 RX ORDER — ONDANSETRON 2 MG/ML
4 INJECTION INTRAMUSCULAR; INTRAVENOUS
Status: ACTIVE | OUTPATIENT
Start: 2021-03-03 | End: 2021-03-04

## 2021-03-03 RX ORDER — ONDANSETRON 2 MG/ML
4 INJECTION INTRAMUSCULAR; INTRAVENOUS AS NEEDED
Status: DISCONTINUED | OUTPATIENT
Start: 2021-03-03 | End: 2021-03-03 | Stop reason: HOSPADM

## 2021-03-03 RX ADMIN — ACETAMINOPHEN 650 MG: 325 TABLET ORAL at 14:31

## 2021-03-03 RX ADMIN — ONDANSETRON HYDROCHLORIDE 4 MG: 2 INJECTION, SOLUTION INTRAMUSCULAR; INTRAVENOUS at 11:32

## 2021-03-03 RX ADMIN — SODIUM CHLORIDE, POTASSIUM CHLORIDE, SODIUM LACTATE AND CALCIUM CHLORIDE 1000 ML: 600; 310; 30; 20 INJECTION, SOLUTION INTRAVENOUS at 08:53

## 2021-03-03 RX ADMIN — FENTANYL CITRATE 100 MCG: 50 INJECTION, SOLUTION INTRAMUSCULAR; INTRAVENOUS at 09:06

## 2021-03-03 RX ADMIN — ASPIRIN 325 MG: 325 TABLET, COATED ORAL at 17:33

## 2021-03-03 RX ADMIN — SODIUM CHLORIDE 125 ML/HR: 9 INJECTION, SOLUTION INTRAVENOUS at 13:10

## 2021-03-03 RX ADMIN — DOCUSATE SODIUM 50 MG AND SENNOSIDES 8.6 MG 1 TABLET: 8.6; 5 TABLET, FILM COATED ORAL at 17:33

## 2021-03-03 RX ADMIN — VANCOMYCIN HYDROCHLORIDE 1000 MG: 1 INJECTION, POWDER, LYOPHILIZED, FOR SOLUTION INTRAVENOUS at 08:58

## 2021-03-03 RX ADMIN — DEXAMETHASONE SODIUM PHOSPHATE 4 MG: 4 INJECTION, SOLUTION INTRAMUSCULAR; INTRAVENOUS at 09:30

## 2021-03-03 RX ADMIN — VANCOMYCIN HYDROCHLORIDE 1000 MG: 1 INJECTION, POWDER, LYOPHILIZED, FOR SOLUTION INTRAVENOUS at 21:17

## 2021-03-03 RX ADMIN — BUPIVACAINE HYDROCHLORIDE 10 MG: 5 INJECTION, SOLUTION EPIDURAL; INTRACAUDAL; PERINEURAL at 09:37

## 2021-03-03 RX ADMIN — OXYCODONE 10 MG: 5 TABLET ORAL at 19:59

## 2021-03-03 RX ADMIN — INSULIN LISPRO 2 UNITS: 100 INJECTION, SOLUTION INTRAVENOUS; SUBCUTANEOUS at 17:33

## 2021-03-03 RX ADMIN — PHENYLEPHRINE HYDROCHLORIDE 40 MCG/MIN: 10 INJECTION INTRAVENOUS at 09:20

## 2021-03-03 RX ADMIN — CELECOXIB 200 MG: 200 CAPSULE ORAL at 08:41

## 2021-03-03 RX ADMIN — CELECOXIB 200 MG: 200 CAPSULE ORAL at 17:33

## 2021-03-03 RX ADMIN — MEPERIDINE HYDROCHLORIDE 12.5 MG: 25 INJECTION INTRAMUSCULAR; INTRAVENOUS; SUBCUTANEOUS at 12:09

## 2021-03-03 RX ADMIN — PROPOFOL 25 MG: 10 INJECTION, EMULSION INTRAVENOUS at 09:18

## 2021-03-03 RX ADMIN — OXYCODONE 5 MG: 5 TABLET ORAL at 15:40

## 2021-03-03 RX ADMIN — ACETAMINOPHEN 650 MG: 325 TABLET ORAL at 08:41

## 2021-03-03 RX ADMIN — PROPOFOL 50 MCG/KG/MIN: 10 INJECTION, EMULSION INTRAVENOUS at 09:18

## 2021-03-03 RX ADMIN — ROSUVASTATIN 5 MG: 10 TABLET, FILM COATED ORAL at 21:17

## 2021-03-03 RX ADMIN — ACETAMINOPHEN 650 MG: 325 TABLET ORAL at 21:17

## 2021-03-03 RX ADMIN — PROPOFOL 25 MG: 10 INJECTION, EMULSION INTRAVENOUS at 09:16

## 2021-03-03 RX ADMIN — MIDAZOLAM 2 MG: 1 INJECTION INTRAMUSCULAR; INTRAVENOUS at 09:06

## 2021-03-03 RX ADMIN — TRANEXAMIC ACID 1 G: 100 INJECTION, SOLUTION INTRAVENOUS at 09:30

## 2021-03-03 RX ADMIN — LIDOCAINE HYDROCHLORIDE 5 MG: 10 INJECTION, SOLUTION EPIDURAL; INFILTRATION; INTRACAUDAL; PERINEURAL at 09:37

## 2021-03-03 RX ADMIN — GLYCOPYRROLATE 0.2 MG: 0.2 INJECTION, SOLUTION INTRAMUSCULAR; INTRAVENOUS at 11:32

## 2021-03-03 NOTE — PERIOP NOTES
TRANSFER - OUT REPORT:    Verbal report given to Blake Agustin RN(name) on Rogelio Hager  being transferred to [facility](unit) for routine post - op       Report consisted of patients Situation, Background, Assessment and   Recommendations(SBAR). Time Pre op antibiotic [id number]  Anesthesia Stop time: 5822      Information from the following report(s) SBAR, Procedure Summary, Intake/Output and MAR was reviewed with the receiving nurse. Opportunity for questions and clarification was provided. Is the patient on 02? NO           Is the patient on a monitor? NO    Is the nurse transporting with the patient? NO    Surgical Waiting Area notified of patient's transfer from PACU? YES      The following personal items collected during your admission accompanied patient upon transfer:   Dental Appliance: Dental Appliances: None  Vision: Visual Aid: Glasses  Hearing Aid:    Jewelry:    Clothing: Clothing: Other (comment)(clothing)  Other Valuables:  Other Valuables: Eyeglasses  Valuables sent to safe:      Eye glasses and clothing bag sent to room with patient

## 2021-03-03 NOTE — ANESTHESIA POSTPROCEDURE EVALUATION
Post-Anesthesia Evaluation and Assessment    Patient: [place] Blair MRN: [id number]  SSN: [id number]    YOB: 1960  Age: 56 y.o. Sex: female      I have evaluated the patient and they are stable and ready for discharge from the PACU. Cardiovascular Function/Vital Signs  Visit Vitals  /88   Pulse 87   Temp 36.6 °C (97.9 °F)   Resp 19   Ht 5' 4\" (1.626 m)   Wt 78.4 kg (172 lb 13.5 oz)   SpO2 96%   BMI 29.67 kg/m²       Patient is status post Spinal anesthesia for Procedure(s):  RIGHT TOTAL KNEE ARTHROPLASTY. Nausea/Vomiting: None    Postoperative hydration reviewed and adequate. Pain:  Pain Scale 1: Numeric (0 - 10) (03/03/21 0820)  Pain Intensity 1: 7 (03/03/21 0820)   Managed    Neurological Status:   Neuro (WDL): Within Defined Limits (03/03/21 0900)   At baseline    Mental Status, Level of Consciousness: Alert and  oriented to person, place, and time    Pulmonary Status:   O2 Device: Room air (03/03/21 1152)   Adequate oxygenation and airway patent    Complications related to anesthesia: None    Post-anesthesia assessment completed. No concerns    Signed By: Ester Jacobo MD     March 3, 2021              Procedure(s):  RIGHT TOTAL KNEE ARTHROPLASTY. spinal    <BSHSIANPOST>    INITIAL Post-op Vital signs:   Vitals Value Taken Time   BP 90/55 03/03/21 1207   Temp 36.6 °C (97.9 °F) 03/03/21 1152   Pulse 86 03/03/21 1209   Resp 16 03/03/21 1209   SpO2 91 % 03/03/21 1209   Vitals shown include unvalidated device data.

## 2021-03-03 NOTE — ANESTHESIA PROCEDURE NOTES
Peripheral Block    Start time: 3/3/2021 9:02 AM  End time: 3/3/2021 9:10 AM  Performed by: Jeremi Mcgregor MD  Authorized by: Jeremi Mcgregor MD       Pre-procedure: Indications: at surgeon's request and post-op pain management    Preanesthetic Checklist: patient identified, risks and benefits discussed, site marked, timeout performed, anesthesia consent given and patient being monitored    Timeout Time: 09:02          Block Type:   Block Type:   Adductor canal  Laterality:  Right  Monitoring:  Standard ASA monitoring, continuous pulse ox, frequent vital sign checks, heart rate, oxygen and responsive to questions  Injection Technique:  Single shot  Procedures: ultrasound guided    Patient Position: supine  Prep: chlorhexidine    Location:  Mid thigh  Needle Type:  Stimuplex  Needle Gauge:  22 G  Needle Localization:  Ultrasound guidance  Med Admin Time: 3/3/2021 9:10 AM    Assessment:  Number of attempts:  1  Injection Assessment:  Incremental injection every 5 mL, negative aspiration for CSF, no paresthesia, no intravascular symptoms, negative aspiration for blood and local visualized surrounding nerve on ultrasound  Patient tolerance:  Patient tolerated the procedure well with no immediate complications

## 2021-03-03 NOTE — PROGRESS NOTES
Ortho Post-Op Note    3/3/2021  2:58 PM    POD:  Day of Surgery  S/P:  Procedure(s):  RIGHT TOTAL KNEE ARTHROPLASTY    Afebrile/VSS, NAD, A&O x 3  Doing well without complaints of nausea  Pain well controlled  Calves soft/NTTP Bilaterally  Leg soft. Dressing clean and dry  Moving lower extremities well. Neurocirculatory exam intact and within normal range. T2DM  Lab Results   Component Value Date/Time    HGB 12.4 02/23/2021 08:56 AM    INR 1.0 02/23/2021 08:56 AM     Recent Labs     02/08/21  0914 07/14/20  1424   CREA 0.67 0.66   BUN 18 11     Estimated Creatinine Clearance: 84.5 mL/min (by C-G formula based on SCr of 0.67 mg/dL).   Visit Vitals  /68   Pulse 79   Temp 98.2 °F (36.8 °C)   Resp 14   Ht 5' 4\" (1.626 m)   Wt 78.4 kg (172 lb 13.5 oz)   SpO2 98%   BMI 29.67 kg/m²       PLAN:  DVT prophylaxis:  mg bid  WBAT with PT-mobilization  Pain Control: Tylenol, celebrex, oxycodone  Plan to D/C home in 1-2 days      Fanta Infante, [address], [facility] Rhett Varner   Department of Orthopaedics  [phone]

## 2021-03-03 NOTE — PROGRESS NOTES
Problem: Mobility Impaired (Adult and Pediatric)  Goal: *Acute Goals and Plan of Care (Insert Text)  Description: FUNCTIONAL STATUS PRIOR TO ADMISSION: Patient was independent and active without use of DME.    HOME SUPPORT PRIOR TO ADMISSION: The patient lived with family but did not require assist.    Physical Therapy Goals  Initiated 3/3/2021    1. Patient will move from supine to sit and sit to supine , scoot up and down, and roll side to side in bed with modified independence within 4 days. 2. Patient will perform sit to stand with modified independence within 4 days. 3. Patient will ambulate with modified independence for 300 feet with the least restrictive device within 4 days. 4. Patient will ascend/descend 4 stairs with 1 handrail(s) with supervision/set-up within 4 days. 5. Patient will perform home exercise program per protocol with modified independence within 4 days. 6. Patient will demonstrate AROM 0-90 degrees in operative joint within 4 days. Outcome: Progressing Towards Goal    PHYSICAL THERAPY EVALUATION  Patient: Abdiel Reid (70 y.o. female)  Date: 3/3/2021  Primary Diagnosis: Primary localized osteoarthritis of right knee [M17.11]  Procedure(s) (LRB):  RIGHT TOTAL KNEE ARTHROPLASTY (Right) Day of Surgery   Precautions: WBAT RLE         ASSESSMENT  Based on the objective data described below, the patient presents with impairment in functional mobility, activity tolerance, ROM, strength and balance s/p R TKA POD #0. PLOF: independent with ADLs and IADLs. Patient lives with  in one story home, 2 steps to enter, no rails . Patient overall supervision for bed mobility and CGA for transfers to/from standing with RW. Pt participated in  gait training 50 feet with RW and CGA, step to converting to mildly reciprocal gait pattern. Patient returned to bed at end of session. Patient's mobility was on target for POD#0.  Will address more exercises, increase gait distance, negotiate stairs and assess for discharge at AM/PM PT session tomorrow. Patient instructed NOT to get up from bed, chair or commode without calling for assistance. Initiated post TKA exercise protocol and wrote same on [employer]. Anticipate discharge after 1-2 more PT sessions tomorrow. Recommend HHPT and ordering of RW prior to discharge, consult to case management placed. Current Level of Function Impacting Discharge (mobility/balance): Functional Outcome Measure: The patient scored Total Score: 8/28 on the Tinetti outcome measure which is indicative of high fall risk. Other factors to consider for discharge:      Patient will benefit from skilled therapy intervention to address the above noted impairments. PLAN :  Recommendations and Planned Interventions: bed mobility training, transfer training, gait training, therapeutic exercises, neuromuscular re-education, patient and family training/education and therapeutic activities      Frequency/Duration: Patient will be followed by physical therapy:  twice daily to address goals. Recommendation for discharge: (in order for the patient to meet his/her long term goals)  Physical therapy at least 2 days/week in the home     This discharge recommendation:  Has been made in collaboration with the attending provider and/or case management    IF patient discharges home will need the following DME: rolling walker         SUBJECTIVE:   Patient stated I feel pretty good.     OBJECTIVE DATA SUMMARY:   HISTORY:    Past Medical History:   Diagnosis Date    Anxiety     Arthritis     Diabetes ([facility] [address]     Diverticulosis of large intestine with hemorrhage 02/14/2014    distal sigmoid colon, mid-sigmoid colon, descending colon - colonoscopy 11/9/16    Ganglion cyst 8/11/2020    Hyperlipemia      5/2016; not on medication    Hypertension     Hypovitaminosis D 04/27/2015    level normal 5/2016    Microscopic hematuria 4/27/2015     Past Surgical History:   Procedure Laterality Date    HX BREAST BIOPSY Right     Stereo Bx 2007 - BENIGN    HX COLONOSCOPY  07/23/2015    HX GYN      ectopic pregnancy    HX HYSTERECTOMY  3/17/2005    TVH    HX ORTHOPAEDIC Right 08/20/2020    ganglian cyst right wrist, right ring finger trigger finger release    [place] ABDOMEN SURGERY PROC UNLISTED  02/01/2017    laparoscopic sigmoid colectomy Dr. Anshu Ford       Personal factors and/or comorbidities impacting plan of care:     Home Situation  Home Environment: Private residence  # Steps to Enter: 2  Rails to Enter: No  Wheelchair Ramp: No  One/Two Story Residence: One story  Living Alone: No  Support Systems: Spouse/Significant Other/Partner  Patient Expects to be Discharged to[de-identified] Private residence  Current DME Used/Available at Home: Cane, straight, Commode, bedside  Tub or Shower Type: Tub/Shower combination    EXAMINATION/PRESENTATION/DECISION MAKING:   Critical Behavior:  Neurologic State: Alert  Orientation Level: Oriented X4  Cognition: Appropriate for age attention/concentration     Hearing:     Skin:  Intact x incision  Edema: mild LE  Range Of Motion:  AROM: Within functional limits           PROM: Within functional limits           Strength:    Strength: Generally decreased, functional                    Tone & Sensation:                                  Coordination:  Coordination: Generally decreased, functional  Vision:      Functional Mobility:  Bed Mobility:  Rolling: Supervision(LLE lifting RLE)  Supine to Sit: Supervision  Sit to Supine: Supervision  Scooting: Supervision  Transfers:  Sit to Stand: Contact guard assistance;Minimum assistance  Stand to Sit: Contact guard assistance;Minimum assistance  Stand Pivot Transfers: Contact guard assistance;Minimum assistance                    Balance:   Sitting: Intact  Standing: Impaired; With support  Standing - Static: Good;Fair;Constant support  Standing - Dynamic : Fair;Constant support  Ambulation/Gait Training:  Distance (ft): 50 Feet (ft)  Assistive Device: Walker, rolling;Gait belt  Ambulation - Level of Assistance: Minimal assistance     Gait Description (WDL): Exceptions to WDL  Gait Abnormalities: Antalgic; Shuffling gait        Base of Support: Widened     Speed/Mercedes: Shuffled; Slow  Step Length: Right shortened;Left shortened        Therapeutic Exercises:   AP, QS, heel slides    Functional Measure:  Tinetti test:    Sitting Balance: 1  Arises: 1  Attempts to Rise: 1  Immediate Standing Balance: 0  Standing Balance: 0  Nudged: 0  Eyes Closed: 0  Turn 360 Degrees - Continuous/Discontinuous: 0  Turn 360 Degrees - Steady/Unsteady: 0  Sitting Down: 1  Balance Score: 4 Balance total score  Indication of Gait: 0  R Step Length/Height: 1  L Step Length/Height: 0  R Foot Clearance: 1  L Foot Clearance: 1  Step Symmetry: 0  Step Continuity: 0  Path: 1  Trunk: 0  Walking Time: 0  Gait Score: 4 Gait total score  Total Score: 8/28 Overall total score         Tinetti Tool Score Risk of Falls  <19 = High Fall Risk  19-24 = Moderate Fall Risk  25-28 = Low Fall Risk  Tinetti ME. Performance-Oriented Assessment of Mobility Problems in Elderly Patients. Feliz 64; [id number].  (Scoring Description: PT Bulletin Feb. 10, 1993)    Older adults: Toña Shabazz et al, 2009; n = 1000 [facility] elderly evaluated with ABC, JOSE, ADL, and IADL)  · Mean JOSE score for males aged 69-68 years = 26.21(3.40)  · Mean JSOE score for females age 69-68 years = 25.16(4.30)  · Mean JOSE score for males over 80 years = 23.29(6.02)  · Mean JOSE score for females over 80 years = 17.20(8.32)        Physical Therapy Evaluation Charge Determination   History Examination Presentation Decision-Making   HIGH Complexity :3+ comorbidities / personal factors will impact the outcome/ POC  HIGH Complexity : 4+ Standardized tests and measures addressing body structure, function, activity limitation and / or participation in recreation  LOW Complexity : Stable, uncomplicated  Other outcome measures tinetti  HIGH       Based on the above components, the patient evaluation is determined to be of the following complexity level: LOW     Pain Rating:  Mild pain R knee    Activity Tolerance:   Fair and requires rest breaks    After treatment patient left in no apparent distress:   Sitting in chair and Call bell within reach    COMMUNICATION/EDUCATION:   The patients plan of care was discussed with: Registered nurse and Case management. Fall prevention education was provided and the patient/caregiver indicated understanding. and Patient/family have participated as able in goal setting and plan of care.     Thank you for this referral.  Gerry Choi, PT

## 2021-03-03 NOTE — BRIEF OP NOTE
Brief Postoperative Note    Patient: Isabel Fischer  YOB: 1960  MRN: [id number]    Date of Procedure: 3/3/2021     Pre-Op Diagnosis: DJD RIGHT KNEE    Post-Op Diagnosis: Same as preoperative diagnosis. Procedure(s):  RIGHT TOTAL KNEE ARTHROPLASTY    Surgeon(s):  Mata Elliott MD    Surgical Assistant: Physician Assistant: Rohit Enriquez PA-C  Surg Asst-1: Samantha KONG    Anesthesia: Spinal     Estimated Blood Loss (mL): 102     Complications: None    Specimens: * No specimens in log *     Implants:   Implant Name Type Inv.  Item Serial No.  Lot No. LRB No. Used Action   CEMENT BNE 40GM FULL DOSE PMMA W/ GENT HI VISC RADPQ LNG - SNA  CEMENT BNE 40GM FULL DOSE PMMA W/ GENT HI VISC RADPQ LNG NA [facility] [employer] [facility] [id number] Right 2 Implanted   BASEPLATE TIB SZ 4 FIX BEAR RHONDA S+ TECHNOLOGY ATTUNE - SNA  BASEPLATE TIB SZ 4 FIX BEAR RHONDA S+ TECHNOLOGY ATTUNE NA [facility] [employer] [facility] [id number] Right 1 Implanted   COMPONENT FEM SZ 5 R KNEE PHIL POST STBL RHONDA ATTUNE - SNA  COMPONENT FEM SZ 5 R KNEE PHIL POST STBL RHONDA ATTUNE NA [employer]_ [id number] Right 1 Implanted   COMPONENT PAT [id number] KNEE POLY DOME RHONDA MEDIALIZED ATTUNE - SNA  COMPONENT PAT [id number] KNEE POLY DOME RHONDA MEDIALIZED ATTUNE NA [employer] [facility] [id number] Right 1 Implanted   INSERT TIB SZ 5 [id number] KNEE CRUCE RET FIX BEAR ATTUNE - SNA  INSERT TIB SZ 5 [id number] KNEE CRUCE RET FIX BEAR ATTUNE NA [employer] [facility] [id number] Right 1 Implanted       Drains: * No LDAs found *    Findings: DJD Right knee    Electronically Signed by Johnathon Barragan PA-C on 3/3/2021 at 11:51 AM

## 2021-03-03 NOTE — PROGRESS NOTES
TRANSFER - IN REPORT:    Verbal report received from Alma(name) on Anali Connell  being received from PACU(unit) for routine post - op      Report consisted of patients Situation, Background, Assessment and   Recommendations(SBAR). Information from the following report(s) SBAR, Kardex, Intake/Output, MAR and Recent Results was reviewed with the receiving nurse. Opportunity for questions and clarification was provided. Assessment completed upon patients arrival to unit and care assumed.

## 2021-03-03 NOTE — ANESTHESIA PROCEDURE NOTES
Spinal Block    Start time: 3/3/2021 9:15 AM  End time: 3/3/2021 9:24 AM  Performed by: Fransico Goodwin CRNA  Authorized by: Samuel Rothman MD     Pre-procedure:   Indications: primary anesthetic  Preanesthetic Checklist: patient identified, risks and benefits discussed, anesthesia consent, site marked, patient being monitored and timeout performed    Timeout Time: 09:14          Spinal Block:   Patient Position:  Seated  Prep Region:  Lumbar  Prep: DuraPrep      Location:  L2-3  Technique:  Single shot        Needle:   Needle Type:  Pencil-tip  Needle Gauge:  24 G  Attempts:  1      Events: CSF confirmed, no blood with aspiration and no paresthesia        Assessment:  Insertion:  Uncomplicated  Patient tolerance:  Patient tolerated the procedure well with no immediate complications

## 2021-03-03 NOTE — ANESTHESIA PREPROCEDURE EVALUATION
Anesthetic History               Review of Systems / Medical History  Patient summary reviewed, nursing notes reviewed and pertinent labs reviewed    Pulmonary  Within defined limits                 Neuro/Psych         Psychiatric history     Cardiovascular    Hypertension          Hyperlipidemia    Exercise tolerance: >4 METS     GI/Hepatic/Renal               Comments:  Endo/Other  Within defined limits           Other Findings              Physical Exam    Airway  Mallampati: III  TM Distance: 4 - 6 cm  Neck ROM: normal range of motion   Mouth opening: Normal     Cardiovascular  Regular rate and rhythm,  S1 and S2 normal,  no murmur, click, rub, or gallop  Rhythm: regular  Rate: normal         Dental    Dentition: Caps/crowns     Pulmonary  Breath sounds clear to auscultation               Abdominal  GI exam deferred       Other Findings            Anesthetic Plan    ASA: 2  Anesthesia type: spinal      Post-op pain plan if not by surgeon: peripheral nerve block single    Induction: Intravenous  Anesthetic plan and risks discussed with: Patient

## 2021-03-04 ENCOUNTER — HOME HEALTH ADMISSION (OUTPATIENT)
Dept: HOME HEALTH SERVICES | Facility: HOME HEALTH | Age: 61
End: 2021-03-04

## 2021-03-04 LAB
ANION GAP SERPL CALC-SCNC: 7 MMOL/L (ref 5–15)
BUN SERPL-MCNC: 16 MG/DL (ref 6–20)
BUN/CREAT SERPL: 20 (ref 12–20)
CALCIUM SERPL-MCNC: 8.2 MG/DL (ref 8.5–10.1)
CHLORIDE SERPL-SCNC: 109 MMOL/L (ref 97–108)
CO2 SERPL-SCNC: 22 MMOL/L (ref 21–32)
CREAT SERPL-MCNC: 0.82 MG/DL (ref 0.55–1.02)
GLUCOSE BLD STRIP.AUTO-MCNC: 121 MG/DL (ref 65–100)
GLUCOSE BLD STRIP.AUTO-MCNC: 125 MG/DL (ref 65–100)
GLUCOSE BLD STRIP.AUTO-MCNC: 96 MG/DL (ref 65–100)
GLUCOSE BLD STRIP.AUTO-MCNC: 97 MG/DL (ref 65–100)
GLUCOSE SERPL-MCNC: 118 MG/DL (ref 65–100)
HGB BLD-MCNC: 9.9 G/DL (ref 11.5–16)
POTASSIUM SERPL-SCNC: 3.7 MMOL/L (ref 3.5–5.1)
SERVICE CMNT-IMP: ABNORMAL
SERVICE CMNT-IMP: ABNORMAL
SERVICE CMNT-IMP: NORMAL
SERVICE CMNT-IMP: NORMAL
SODIUM SERPL-SCNC: 138 MMOL/L (ref 136–145)

## 2021-03-04 PROCEDURE — 97110 THERAPEUTIC EXERCISES: CPT

## 2021-03-04 PROCEDURE — 97530 THERAPEUTIC ACTIVITIES: CPT

## 2021-03-04 PROCEDURE — 36415 COLL VENOUS BLD VENIPUNCTURE: CPT

## 2021-03-04 PROCEDURE — 82962 GLUCOSE BLOOD TEST: CPT

## 2021-03-04 PROCEDURE — 74011250637 HC RX REV CODE- 250/637: Performed by: PHYSICIAN ASSISTANT

## 2021-03-04 PROCEDURE — 80048 BASIC METABOLIC PNL TOTAL CA: CPT

## 2021-03-04 PROCEDURE — 97116 GAIT TRAINING THERAPY: CPT

## 2021-03-04 PROCEDURE — 65270000029 HC RM PRIVATE

## 2021-03-04 PROCEDURE — 85018 HEMOGLOBIN: CPT

## 2021-03-04 RX ORDER — OXYCODONE HYDROCHLORIDE 5 MG/1
5-10 TABLET ORAL
Qty: 60 TAB | Refills: 0 | Status: SHIPPED | OUTPATIENT
Start: 2021-03-04 | End: 2021-03-05

## 2021-03-04 RX ORDER — ASPIRIN 325 MG
325 TABLET ORAL 2 TIMES DAILY
Qty: 60 TAB | Refills: 0 | Status: SHIPPED | OUTPATIENT
Start: 2021-03-04 | End: 2021-04-03

## 2021-03-04 RX ADMIN — POTASSIUM CHLORIDE 10 MEQ: 750 TABLET, FILM COATED, EXTENDED RELEASE ORAL at 08:35

## 2021-03-04 RX ADMIN — CELECOXIB 200 MG: 200 CAPSULE ORAL at 08:38

## 2021-03-04 RX ADMIN — OXYCODONE 10 MG: 5 TABLET ORAL at 08:39

## 2021-03-04 RX ADMIN — METFORMIN HYDROCHLORIDE 500 MG: 500 TABLET ORAL at 17:19

## 2021-03-04 RX ADMIN — METOPROLOL SUCCINATE 50 MG: 50 TABLET, EXTENDED RELEASE ORAL at 08:35

## 2021-03-04 RX ADMIN — ACETAMINOPHEN 650 MG: 325 TABLET ORAL at 03:00

## 2021-03-04 RX ADMIN — OXYCODONE 10 MG: 5 TABLET ORAL at 17:20

## 2021-03-04 RX ADMIN — DOCUSATE SODIUM 50 MG AND SENNOSIDES 8.6 MG 1 TABLET: 8.6; 5 TABLET, FILM COATED ORAL at 17:19

## 2021-03-04 RX ADMIN — ONDANSETRON 4 MG: 4 TABLET, ORALLY DISINTEGRATING ORAL at 18:52

## 2021-03-04 RX ADMIN — ACETAMINOPHEN 650 MG: 325 TABLET ORAL at 14:33

## 2021-03-04 RX ADMIN — ASPIRIN 325 MG: 325 TABLET, COATED ORAL at 17:19

## 2021-03-04 RX ADMIN — POLYETHYLENE GLYCOL 3350 17 G: 17 POWDER, FOR SOLUTION ORAL at 08:40

## 2021-03-04 RX ADMIN — OXYCODONE 10 MG: 5 TABLET ORAL at 14:33

## 2021-03-04 RX ADMIN — OXYCODONE 10 MG: 5 TABLET ORAL at 01:27

## 2021-03-04 RX ADMIN — DOCUSATE SODIUM 50 MG AND SENNOSIDES 8.6 MG 1 TABLET: 8.6; 5 TABLET, FILM COATED ORAL at 08:38

## 2021-03-04 RX ADMIN — Medication 10 ML: at 14:34

## 2021-03-04 RX ADMIN — METFORMIN HYDROCHLORIDE 500 MG: 500 TABLET ORAL at 08:38

## 2021-03-04 RX ADMIN — HYDROCHLOROTHIAZIDE: 25 TABLET ORAL at 08:36

## 2021-03-04 RX ADMIN — Medication 10 ML: at 22:00

## 2021-03-04 RX ADMIN — ACETAMINOPHEN 650 MG: 325 TABLET ORAL at 08:35

## 2021-03-04 RX ADMIN — CELECOXIB 200 MG: 200 CAPSULE ORAL at 17:19

## 2021-03-04 RX ADMIN — ASPIRIN 325 MG: 325 TABLET, COATED ORAL at 08:38

## 2021-03-04 RX ADMIN — OXYCODONE 10 MG: 5 TABLET ORAL at 11:35

## 2021-03-04 NOTE — PROGRESS NOTES
Problem: Mobility Impaired (Adult and Pediatric)  Goal: *Acute Goals and Plan of Care (Insert Text)  Description: FUNCTIONAL STATUS PRIOR TO ADMISSION: Patient was independent and active without use of DME.    HOME SUPPORT PRIOR TO ADMISSION: The patient lived with family but did not require assist.    Physical Therapy Goals  Initiated 3/3/2021    1. Patient will move from supine to sit and sit to supine , scoot up and down, and roll side to side in bed with modified independence within 4 days. 2. Patient will perform sit to stand with modified independence within 4 days. 3. Patient will ambulate with modified independence for 300 feet with the least restrictive device within 4 days. 4. Patient will ascend/descend 4 stairs with 1 handrail(s) with supervision/set-up within 4 days. 5. Patient will perform home exercise program per protocol with modified independence within 4 days. 6. Patient will demonstrate AROM 0-90 degrees in operative joint within 4 days. 3/4/2021 1052 by William VILLANUEVA  Outcome: Progressing Towards Goal    PHYSICAL THERAPY TREATMENT  Patient: Julisa Ace (3 y.o. female)  Date: 3/4/2021  Diagnosis: Primary localized osteoarthritis of right knee [M17.11] Primary localized osteoarthritis of right knee  Procedure(s) (LRB):  RIGHT TOTAL KNEE ARTHROPLASTY (Right) 1 Day Post-Op  Precautions:    Chart, physical therapy assessment, plan of care and goals were reviewed. ASSESSMENT  Patient continues with skilled PT services and is progressing towards goals. Pt received supine in bed and willing to try therapy. Pt stated she should have listened the morning session and not over done it because the pain is a lot. Pt continued to tolerate supine exercises presenting with decreased ankle ROM compared to this morning. Pt continued to need VC to be aware of increase of pain with activity and not to overdue it. Pt able to STS from bedside and lateral step to [facility] with RW.  She needed Min A for bed mobility for LE support only. Pt completed the session supine in bed with HOB elevated to comfort. Pt left call bell within reach, SCD's donned and on, ice post of R knee, and all other needs met at the time. Rn notified of increase of pain and BSC placed beside bed instead of amb to restroom. Current Level of Function Impacting Discharge (mobility/balance): Min A for bed mobility, CGA for STS and amb to [facility] with RW    Other factors to consider for discharge: pain management, overdoing it         PLAN :  Patient continues to benefit from skilled intervention to address the above impairments. Continue treatment per established plan of care. to address goals. Recommendation for discharge: (in order for the patient to meet his/her long term goals)  Physical therapy at least 2 days/week in the home     This discharge recommendation:  Has not yet been discussed the attending provider and/or case management    IF patient discharges home will need the following DME: patient owns DME required for discharge       SUBJECTIVE:   Patient stated I should have listened.     OBJECTIVE DATA SUMMARY:   Critical Behavior:  Neurologic State: Alert, Appropriate for age  Orientation Level: Oriented X4  Cognition: Appropriate decision making, Appropriate for age attention/concentration, Appropriate safety awareness, Follows commands       Functional Mobility Training:  Bed Mobility:  Rolling: Supervision  Supine to Sit: Minimum assistance  Sit to Supine: Minimum assistance  Scooting: Stand-by assistance     Transfers:  Sit to Stand: Contact guard assistance; Additional time  Stand to Sit: Contact guard assistance; Additional time    Balance:  Sitting: Intact  Standing: Impaired  Standing - Static: Constant support; Fair  Standing - Dynamic : Constant support; Fair    Ambulation/Gait Training:  Distance (ft): 4 Feet (ft)(lateral steps to [facility])  Assistive Device: Gait belt;Walker, rolling  Ambulation - Level of Assistance: Contact guard assistance; Additional time  Gait Abnormalities: Antalgic  Base of Support: Shift to left  Speed/Emrcedes: Pace decreased (<100 feet/min)  Step Length: Right shortened;Left shortened    Therapeutic Exercises:     EXERCISE   Sets   Reps   Active Active Assist   Passive Self ROM   Comments   Ankle Pumps 1 5 [x]                                        []                                        []                                        []                                           Quad Sets 1 5 [x]                                        []                                        []                                        []                                           Hamstring Sets 1 5 [x]                                        []                                        []                                        []                                           Short Arc Quads   []                                        []                                        []                                        []                                           Knee Extension Stretch     []                                          []                                          []                                          []                                           Heel Slides   []                                        []                                        []                                        []                                           Long Arc Quads   []                                        []                                        []                                        []                                           Knee Flexion Stretch   []                                        []                                        []                                        []                                           Straight Leg Raises   []                                        []                                        [] []                                               Pain Rating:  10/10 with WBing    Activity Tolerance:   Good and Fair    After treatment patient left in no apparent distress:   Supine in bed and Call bell within reach    COMMUNICATION/COLLABORATION:   The patients plan of care was discussed with: Registered nurse.      Haylee Mcdermott PTA   Time Calculation: 25 mins

## 2021-03-04 NOTE — PROGRESS NOTES
CHING:  RUR 8%    [facility]  accepted patient. [facility] denied for walker. Referral Dino will deliver a walker this afternoon. Care Management Interventions  PCP Verified by CM: Yes  Palliative Care Criteria Met (RRAT>21 & CHF Dx)?: No  Mode of Transport at Discharge: ( to transport.)  Transition of Care Consult (CM Consult): [address]: Yes  MyChart Signup: Yes  Physical Therapy Consult: Yes  Occupational Therapy Consult: Yes  Speech Therapy Consult: No  Current Support Network: Lives with Spouse  The Patient and/or Patient Representative was Provided with a Choice of Provider and Agrees with the Discharge Plan?: Yes  Freedom of Choice List was Provided with Basic Dialogue that Supports the Patient's Individualized Plan of Care/Goals, Treatment Preferences and Shares the Quality Data Associated with the Providers?: Yes  Discharge Location  Discharge Placement: Home with home health    Reason for Admission:   Right Total Knee Arthroplasty. RUR Score:   8%                  Plan for utilizing home health:     [facility] accepted. PCP: First and Last name:  Brie Connors   Name of Practice:    Are you a current patient: Yes/No:    Approximate date of last visit: 4 weeks ago. Can you participate in a virtual visit with your PCP: Yes                    Current Advanced Directive/Advance Care Plan: Not on file    Healthcare Decision Maker:   Click here to complete [place] including selection of the Healthcare Decision Maker Relationship (ie \"Primary\")                         Transition of Care Plan:       CM met with patient to introduce CM role, verify demographics and begin discharge planning. Patient lives at home with her . She does not have a walker, but one has been ordered. Patient gets prescriptions filled at [facility] on [address]. Insurance verified: [employer].     Patient's  to transport at discharge.     Prabhu Uriostegui RN/CRM  [phone]

## 2021-03-04 NOTE — DISCHARGE INSTRUCTIONS
DC Orders All Total Knees    Case Management for DC planning to Home HC .   - PT for 3 times a week for 1st week (4 PT visits to be scheduled), patient will transition to outpatient PT in 10 days post-operatively; WBAT. -  mg BID for 30 days post-operatively. - Remove AQUACEL bandage on the 7th day post-operatively (PLEASE reference discharge instructions INCISION CARE for additional information). - Remove staples at 2 weeks post-op; 3/17/21 .   - Follow up in Office in 2 weeks. After Hospital Care Plan:  Discharge Instructions Knee Replacement-Dr. Nan Campbell    Patient Name: Leonel Dior  Date of procedure: 3/3/2021   Procedure: Procedure(s):  RIGHT TOTAL KNEE ARTHROPLASTY  Surgeon: Kenneth Rosario) and Role:     Inna James MD - Primary    PCP: Hira Alba NP  Date of discharge: No discharge date for patient encounter. Follow up appointments   Follow up with Dr. Nan Campbell in 2 weeks. Call [phone] to make an appointment.  If home health has been arranged for you the agency will contact you to arrange dates/times for visits. Please call them if you do not hear from them within 24 hours after you are discharged    When to call your Orthopaedic Surgeon: Call [phone].  If you call after 5pm or on a weekend, the on call physician will be contacted   Unrelieved pain   Signs of infection-if your incision is red; continues to have drainage; drainage has a foul odor or if you have a persistent fever over 101 degrees   Signs of a blood clot in your leg-calf pain, tenderness, redness, swelling of lower leg    When to call your Primary Care Physician:   Concerns about medical conditions such as diabetes, high blood pressure, asthma, congestive heart failure   Call if blood sugars are elevated, persistent headache or dizziness, coughing or congestion, constipation or diarrhea, burning with urination, abnormal heart rate    When to call [id number] go to the nearest emergency room   Acute onset of chest pain, shortness of breath, difficulty breathing      Activity   Weight bearing as tolerated with walker or crutches. Refer to your handbook for instructions and pictures   Complete your Home Exercise Program daily as instructed by your therapist.  Refer to your handbook for instructions and pictures   Get up every one hour and walk (except at night when sleeping)   Do not drive or operate heavy machinery    Incision Care   The Aquacel (brown, waterproof) surgical dressing is to remain on your knee for 7 days. On the 7th day have someone gently peel the dressing off by carefully lifting the edge and stretching it slightly to break the adhesive seal   If the home health agency has not removed the Aquacel bandage by the 7th day please remove it yourself   You will have staples in your knee incision. They will be removed by the home health agency staff   If your Aquacel dressing comes loose/off before the 7th day, you may replace it with a dry sterile gauze dressing; change it daily. Once your incision is not draining, you may leave it open to air   You may take a shower with the Aquacel dressing in place. Once the Aquacel is removed, you may shower and get your incision wet but do not submerge your incision under water in a bath tub, hot tub or swimming pool for 6 weeks after surgery. Preventing blood clots    Take  mg BID for 30 days post-operatively.  Wear elastic stockings (TEDS) for 4 weeks.   You should remove them for approximately 1 hour daily for showering/sponge bathing    Pain management   Keep ice wrap in place except when walking; changing gel packs every 4 hours   Lie down and elevate your leg on pillows for about 30 minutes after walking to decrease swelling and pain    Do ankle pumps (10 repetitions) every hour while awake and get up frequently to walk    Take Tylenol 500mg every 6 hours for 2 weeks    Take narcotic pain pill as prescribed if needed. Take with food; avoid alcohol while taking pain medication. Decrease the amount of narcotic pain medication as your pain lessens     Diet   Resume usual diet; drink plenty of fluids; eat foods high in fiber   You may want to take a stool softener (such as Senokot-S or Colace) to prevent constipation while you are taking pain medication.   If constipation occurs, take a laxative (such as Dulcolax tablets, Milk of Magnesia, or a suppository)

## 2021-03-04 NOTE — OP NOTES
[address]   OPERATIVE REPORT    Name:  Gilbert Bernal  MR#:  [id number]  :  1960  ACCOUNT #:  [de-identified]  DATE OF SERVICE:  2021      PREOPERATIVE DIAGNOSIS:  Advanced degenerative arthritis, right knee. POSTOPERATIVE DIAGNOSIS:  Advanced degenerative arthritis, right knee. PROCEDURE PERFORMED:  Right total knee replacement. SURGEON:  Maribel Rios MD    ASSISTANT:  Osiel Alvarez PA-C    ANESTHESIA:  Regional block plus spinal.    COMPLICATIONS:  None. SPECIMENS REMOVED:  Tibial and femoral bone fragments. IMPLANTS:  Right total knee components as listed in operative report. ESTIMATED BLOOD LOSS:  10 mL. DRAINS:  None. INDICATIONS:  This patient has advanced degenerative arthritis of her right knee. She has failed conservative treatment and presents for the above procedure. PROCEDURE:  On the day of operation, the patient was taken to the holding area. Regional block was administered. The patient was taken to the operating room. Spinal anesthesia was administered. She was placed in supine position. Antibiotics were given. A consent confirmed. The right lower extremity was prepped and draped in the usual fashion. After exsanguination with an Esmarch, tourniquet inflated to 290 mmHg. A midline longitudinal incision was made over the knee and was carried through subcutaneous tissue. A medial parapatellar capsular incision was made. Advanced degenerative changes were noted throughout the knee. The knee was debrided of osteophytes and soft tissue. A drill was used to gain access to the femoral canal.  Distal femoral cutting guide assembled with a 5-degree valgus cut. Distal femoral cut made with an oscillating saw. Femur was sized and felt to be #5 narrow in the Attune system. Anterior and posterior cuts were made. Chamfer cuts were made. Box cut was made for the posterior stabilizing device.   External tibial alignment guide was assembled. Tibial plateau cut made with an oscillating saw. Flexion and extension gaps were evaluated and felt to be appropriate. The tibia was sized and felt to be a #4 in the Attune system. Drill and punch were used to repair the tibial plateau. The patella was prepared with an oscillating saw and sized for 35 mm. Trial components were put into place. The 10-mm insert provided the best fit, range of motion, with proper alignment and proper tracking of the patella. The ligaments were felt to be properly balanced. The trial components were removed. The knee was sterilely irrigated with pulse irrigation as well as antibiotic solution. The components were cemented into place with antibiotic-impregnated cement. The 10-mm trial insert was put into place. Soft tissue was infiltrated with Exparel local anesthetic. After the cement matured, the 10-mm insert was put into place and felt to be stable. Tourniquet released. Coagulation was achieved with a combination of #2 and #1 Vicryl suture supplemented with #2 PDS. 2-0 Vicryl was used to close the subcutaneous tissue and staples used to close the skin. Sterile dressings were applied. The patient was taken to recovery room in satisfactory condition. The assistant, Ethan Xie PA-C, assisted me with positioning, retraction, implant installation, and incision closure.         Gabriela Charlton MD      [id number]  D:  03/04/2021 12:21  T:  03/04/2021 14:18  JOB #:  [id number]  CC:  Margi Polk MD

## 2021-03-04 NOTE — PROGRESS NOTES
Problem: Mobility Impaired (Adult and Pediatric)  Goal: *Acute Goals and Plan of Care (Insert Text)  Description: FUNCTIONAL STATUS PRIOR TO ADMISSION: Patient was independent and active without use of DME.    HOME SUPPORT PRIOR TO ADMISSION: The patient lived with family but did not require assist.    Physical Therapy Goals  Initiated 3/3/2021    1. Patient will move from supine to sit and sit to supine , scoot up and down, and roll side to side in bed with modified independence within 4 days. 2. Patient will perform sit to stand with modified independence within 4 days. 3. Patient will ambulate with modified independence for 300 feet with the least restrictive device within 4 days. 4. Patient will ascend/descend 4 stairs with 1 handrail(s) with supervision/set-up within 4 days. 5. Patient will perform home exercise program per protocol with modified independence within 4 days. 6. Patient will demonstrate AROM 0-90 degrees in operative joint within 4 days. Outcome: Progressing Towards Goal    PHYSICAL THERAPY TREATMENT  Patient: Jennifer Marin (89 y.o. female)  Date: 3/4/2021  Diagnosis: Primary localized osteoarthritis of right knee [M17.11] Primary localized osteoarthritis of right knee  Procedure(s) (LRB):  RIGHT TOTAL KNEE ARTHROPLASTY (Right) 1 Day Post-Op  Precautions:    Chart, physical therapy assessment, plan of care and goals were reviewed. ASSESSMENT  Patient continues with skilled PT services and is progressing towards goals. Pt received sitting EOB with ice on LLE and willing to work with therapy. Pt stated that she had pain meds about 15 mins prior to session with 6/10 pain. Pt explained that she had a high tolerance of pain and wanted to try. Pt able to tolerate gait training to W/c with RW with CGA with VC needed for sequencing to reduce pain with WBing. Pt able to tolerate stair training with trial with RHR with therapist with 2 steps with Min A.  Pt  present and assisted with stair training with ed for pt safety and caregiver safety. Pt able to continue with amb back to room with RW with 3 standing rest breaks. Pt seems to be somewhat impulsive and needs very VC for activity pacing and safety awareness. Pt completed session seated in chair with call bell within reach and family in room. Rn notified of session and will see this afternoon to ensure pain under control. Current Level of Function Impacting Discharge (mobility/balance): CGA for STS and amb up to 100' with RW, Min A for stair training with no HR HHA only. Other factors to consider for discharge: Pain management         PLAN :  Patient continues to benefit from skilled intervention to address the above impairments. Continue treatment per established plan of care. to address goals. Recommendation for discharge: (in order for the patient to meet his/her long term goals)  Physical therapy at least 2 days/week in the home     This discharge recommendation:  Has not yet been discussed the attending provider and/or case management    IF patient discharges home will need the following DME: rolling walker       SUBJECTIVE:   Patient stated I need to get moving.     OBJECTIVE DATA SUMMARY:   Critical Behavior:  Neurologic State: Appropriate for age  Orientation Level: Appropriate for age  Cognition: Appropriate for age attention/concentration       Functional Mobility Training:  Bed Mobility:  Rolling: Supervision  Supine to Sit: Supervision  Scooting: Supervision    Transfers:  Sit to Stand: Contact guard assistance  Stand to Sit: Contact guard assistance     Balance:  Sitting: Intact  Standing: Impaired  Standing - Static: Constant support;Good  Standing - Dynamic : Constant support; Fair    Ambulation/Gait Training:  Distance (ft): 150 Feet (ft)  Assistive Device: Gait belt;Walker, rolling  Ambulation - Level of Assistance: Contact guard assistance  Gait Abnormalities: Antalgic; Step to gait  Base of Support: Widened;Shift to left  Speed/Mercedes: Pace decreased (<100 feet/min); Slow  Step Length: Right shortened;Left shortened    Stairs:  Number of Stairs Trained: 2(x2 )  Stairs - Level of Assistance: Minimum assistance   Rail Use: None    Pain Ratin/10 post treatment    Activity Tolerance:   Good    After treatment patient left in no apparent distress:   Sitting in chair, Call bell within reach, and Caregiver / family present    COMMUNICATION/COLLABORATION:   The patients plan of care was discussed with: Registered nurse.      Shorty Mcdermott PTA   Time Calculation: 37 mins

## 2021-03-04 NOTE — PROGRESS NOTES
TOTAL KNEE PROGRESS NOTE      Subjective:     Post-Operative Day: 1 Status Post right Total Knee Arthroplasty  Systemic or Specific Complaints:Pain Control    Objective:     Patient Vitals for the past 24 hrs:   BP Temp Pulse Resp SpO2   03/04/21 0832 (!) 134/101 97.7 °F (36.5 °C) 70 16 98 %   03/04/21 0236 126/75 98 °F (36.7 °C) 80 16 97 %   03/03/21 2115 121/74 98.5 °F (36.9 °C) 75 16 97 %   03/03/21 1705 127/86  81     03/03/21 1703 111/83  78     03/03/21 1700 (!) 136/90  78     03/03/21 1622 132/71 98 °F (36.7 °C) 76 16 98 %   03/03/21 1536 121/77 98.2 °F (36.8 °C) 74 14 99 %   03/03/21 1430 111/68 98.2 °F (36.8 °C) 79 14 98 %   03/03/21 1324 108/71 98.1 °F (36.7 °C) 68 14 96 %   03/03/21 1315 108/70  73 14 95 %   03/03/21 1300 102/72  72 12 94 %   03/03/21 1252  97.8 °F (36.6 °C)      03/03/21 1247 100/78  73 12 95 %   03/03/21 1230 113/76  76 15 94 %   03/03/21 1215 114/80  79 25 95 %   03/03/21 1210 116/84  81 27 94 %   03/03/21 1207 (!) 90/55  81 20 (!) 88 %   03/03/21 1205 (!) 84/66  83 25 98 %   03/03/21 1201 96/76 97.9 °F (36.6 °C) 85 26 92 %   03/03/21 1155 114/85  85 22 (!) 88 %   03/03/21 1154   84 18 91 %   03/03/21 1152 111/88 97.9 °F (36.6 °C) 87 19 96 %   03/03/21 0912 113/78  74 14 100 %       General: alert, cooperative, no distress, appears stated age   Wound: Wound clean and dry no evidence of infection. , No Erythema, No Edema, No Drainage and Wound Intact   Motion: Extension: Full Extension   DVT Exam: No evidence of DVT seen on physical exam.  Negative Mi's sign. No cords or calf tenderness. No significant calf/ankle edema.      Data Review:    Recent Results (from the past 24 hour(s))   GLUCOSE, POC    Collection Time: 03/03/21  9:09 AM   Result Value Ref Range    Glucose (POC) 80 65 - 100 mg/dL    Performed by Omero Randall POC    Collection Time: 03/03/21 12:12 PM   Result Value Ref Range    Glucose (POC) 81 65 - 100 mg/dL    Performed by Aidan Barger Pricila    GLUCOSE, POC    Collection Time: 03/03/21  4:08 PM   Result Value Ref Range    Glucose (POC) 184 (H) 65 - 100 mg/dL    Performed by Paulo VENEGAS (CON)    GLUCOSE, POC    Collection Time: 03/03/21  9:49 PM   Result Value Ref Range    Glucose (POC) 113 (H) 65 - 100 mg/dL    Performed by Amor Aggarwal PCT    HEMOGLOBIN    Collection Time: 03/04/21  2:56 AM   Result Value Ref Range    HGB 9.9 (L) 11.5 - 60.0 g/dL   METABOLIC PANEL, BASIC    Collection Time: 03/04/21  2:56 AM   Result Value Ref Range    Sodium 138 136 - 145 mmol/L    Potassium 3.7 3.5 - 5.1 mmol/L    Chloride 109 (H) 97 - 108 mmol/L    CO2 22 21 - 32 mmol/L    Anion gap 7 5 - 15 mmol/L    Glucose 118 (H) 65 - 100 mg/dL    BUN 16 6 - 20 MG/DL    Creatinine 0.82 0.55 - 1.02 MG/DL    BUN/Creatinine ratio 20 12 - 20      GFR est AA >60 >60 ml/min/1.73m2    GFR est non-AA >60 >60 ml/min/1.73m2    Calcium 8.2 (L) 8.5 - 10.1 MG/DL   GLUCOSE, POC    Collection Time: 03/04/21  5:52 AM   Result Value Ref Range    Glucose (POC) 96 65 - 100 mg/dL    Performed by Parudi          Assessment:     Status Post right Total Knee Arthroplasty. Doing well postoperatively. . Bandage aquacel, post-op ACE. Labs stable. PT progressing well. Pain control WNL. Generalized swelling of knee. Plan:     Continues current post-op course  Continue PT per protocol  Plan to discharge to Home [facility] [place]. today vs tomorrow based on PT clearance.

## 2021-03-04 NOTE — ROUTINE PROCESS
Bedside shift change report given to Lillian (oncoming nurse) by Korin Beckford (offgoing nurse). Report included the following information SBAR.

## 2021-03-05 LAB
GLUCOSE BLD STRIP.AUTO-MCNC: 101 MG/DL (ref 65–100)
GLUCOSE BLD STRIP.AUTO-MCNC: 113 MG/DL (ref 65–100)
GLUCOSE BLD STRIP.AUTO-MCNC: 119 MG/DL (ref 65–100)
GLUCOSE BLD STRIP.AUTO-MCNC: 156 MG/DL (ref 65–100)
SERVICE CMNT-IMP: ABNORMAL

## 2021-03-05 PROCEDURE — 97530 THERAPEUTIC ACTIVITIES: CPT

## 2021-03-05 PROCEDURE — 82962 GLUCOSE BLOOD TEST: CPT

## 2021-03-05 PROCEDURE — 74011250637 HC RX REV CODE- 250/637: Performed by: PHYSICIAN ASSISTANT

## 2021-03-05 PROCEDURE — 65270000029 HC RM PRIVATE

## 2021-03-05 PROCEDURE — 97116 GAIT TRAINING THERAPY: CPT

## 2021-03-05 PROCEDURE — 74011636637 HC RX REV CODE- 636/637: Performed by: PHYSICIAN ASSISTANT

## 2021-03-05 RX ORDER — ONDANSETRON 4 MG/1
8 TABLET, ORALLY DISINTEGRATING ORAL
Status: DISCONTINUED | OUTPATIENT
Start: 2021-03-05 | End: 2021-03-06 | Stop reason: HOSPADM

## 2021-03-05 RX ORDER — OXYCODONE HYDROCHLORIDE 5 MG/1
10 TABLET ORAL
Status: DISCONTINUED | OUTPATIENT
Start: 2021-03-05 | End: 2021-03-06 | Stop reason: HOSPADM

## 2021-03-05 RX ORDER — PROCHLORPERAZINE MALEATE 5 MG
10 TABLET ORAL
Status: DISCONTINUED | OUTPATIENT
Start: 2021-03-05 | End: 2021-03-06 | Stop reason: HOSPADM

## 2021-03-05 RX ORDER — HYDROCODONE BITARTRATE AND ACETAMINOPHEN 10; 325 MG/1; MG/1
1 TABLET ORAL
Status: DISCONTINUED | OUTPATIENT
Start: 2021-03-05 | End: 2021-03-06 | Stop reason: HOSPADM

## 2021-03-05 RX ORDER — ONDANSETRON 8 MG/1
8 TABLET, ORALLY DISINTEGRATING ORAL
Qty: 10 TAB | Refills: 0 | Status: SHIPPED | OUTPATIENT
Start: 2021-03-05 | End: 2021-03-17 | Stop reason: ALTCHOICE

## 2021-03-05 RX ORDER — HYDROCODONE BITARTRATE AND ACETAMINOPHEN 10; 325 MG/1; MG/1
1 TABLET ORAL
Qty: 40 TAB | Refills: 0 | Status: SHIPPED | OUTPATIENT
Start: 2021-03-05 | End: 2021-03-12

## 2021-03-05 RX ADMIN — HYDROCODONE BITARTRATE AND ACETAMINOPHEN 1 TABLET: 10; 325 TABLET ORAL at 23:04

## 2021-03-05 RX ADMIN — CELECOXIB 200 MG: 200 CAPSULE ORAL at 17:38

## 2021-03-05 RX ADMIN — OXYCODONE 10 MG: 5 TABLET ORAL at 08:41

## 2021-03-05 RX ADMIN — Medication 10 ML: at 21:12

## 2021-03-05 RX ADMIN — PROCHLORPERAZINE MALEATE 10 MG: 5 TABLET ORAL at 12:13

## 2021-03-05 RX ADMIN — OXYCODONE 10 MG: 5 TABLET ORAL at 12:13

## 2021-03-05 RX ADMIN — HYDROCODONE BITARTRATE AND ACETAMINOPHEN 1 TABLET: 10; 325 TABLET ORAL at 15:04

## 2021-03-05 RX ADMIN — DOCUSATE SODIUM 50 MG AND SENNOSIDES 8.6 MG 1 TABLET: 8.6; 5 TABLET, FILM COATED ORAL at 08:08

## 2021-03-05 RX ADMIN — ONDANSETRON 8 MG: 4 TABLET, ORALLY DISINTEGRATING ORAL at 16:17

## 2021-03-05 RX ADMIN — ASPIRIN 325 MG: 325 TABLET, COATED ORAL at 17:38

## 2021-03-05 RX ADMIN — CELECOXIB 200 MG: 200 CAPSULE ORAL at 08:08

## 2021-03-05 RX ADMIN — ROSUVASTATIN 5 MG: 10 TABLET, FILM COATED ORAL at 21:12

## 2021-03-05 RX ADMIN — ASPIRIN 325 MG: 325 TABLET, COATED ORAL at 08:08

## 2021-03-05 RX ADMIN — POTASSIUM CHLORIDE 10 MEQ: 750 TABLET, FILM COATED, EXTENDED RELEASE ORAL at 08:08

## 2021-03-05 RX ADMIN — Medication 10 ML: at 14:21

## 2021-03-05 RX ADMIN — METFORMIN HYDROCHLORIDE 500 MG: 500 TABLET ORAL at 08:08

## 2021-03-05 RX ADMIN — DOCUSATE SODIUM 50 MG AND SENNOSIDES 8.6 MG 1 TABLET: 8.6; 5 TABLET, FILM COATED ORAL at 17:38

## 2021-03-05 RX ADMIN — PROCHLORPERAZINE MALEATE 10 MG: 5 TABLET ORAL at 17:38

## 2021-03-05 RX ADMIN — METFORMIN HYDROCHLORIDE 500 MG: 500 TABLET ORAL at 17:38

## 2021-03-05 RX ADMIN — ACETAMINOPHEN 650 MG: 325 TABLET ORAL at 02:36

## 2021-03-05 RX ADMIN — ACETAMINOPHEN 650 MG: 325 TABLET ORAL at 14:18

## 2021-03-05 RX ADMIN — ACETAMINOPHEN 650 MG: 325 TABLET ORAL at 08:08

## 2021-03-05 RX ADMIN — HYDROCHLOROTHIAZIDE: 25 TABLET ORAL at 08:08

## 2021-03-05 RX ADMIN — HYDROCODONE BITARTRATE AND ACETAMINOPHEN 1 TABLET: 10; 325 TABLET ORAL at 19:01

## 2021-03-05 RX ADMIN — METOPROLOL SUCCINATE 50 MG: 50 TABLET, EXTENDED RELEASE ORAL at 08:08

## 2021-03-05 RX ADMIN — INSULIN LISPRO 2 UNITS: 100 INJECTION, SOLUTION INTRAVENOUS; SUBCUTANEOUS at 21:18

## 2021-03-05 RX ADMIN — ACETAMINOPHEN 650 MG: 325 TABLET ORAL at 21:11

## 2021-03-05 NOTE — PROGRESS NOTES
Problem: Mobility Impaired (Adult and Pediatric)  Goal: *Acute Goals and Plan of Care (Insert Text)  Description: FUNCTIONAL STATUS PRIOR TO ADMISSION: Patient was independent and active without use of DME.    HOME SUPPORT PRIOR TO ADMISSION: The patient lived with family but did not require assist.    Physical Therapy Goals  Initiated 3/3/2021    1. Patient will move from supine to sit and sit to supine , scoot up and down, and roll side to side in bed with modified independence within 4 days. 2. Patient will perform sit to stand with modified independence within 4 days. 3. Patient will ambulate with modified independence for 300 feet with the least restrictive device within 4 days. 4. Patient will ascend/descend 4 stairs with 1 handrail(s) with supervision/set-up within 4 days. 5. Patient will perform home exercise program per protocol with modified independence within 4 days. 6. Patient will demonstrate AROM 0-90 degrees in operative joint within 4 days. Outcome: Progressing Towards Goal    PHYSICAL THERAPY TREATMENT  Patient: Abdiel Reid (1 y.o. female)  Date: 3/5/2021  Diagnosis: Primary localized osteoarthritis of right knee [M17.11] Primary localized osteoarthritis of right knee  Procedure(s) (LRB):  RIGHT TOTAL KNEE ARTHROPLASTY (Right) 2 Days Post-Op  Precautions:    Chart, physical therapy assessment, plan of care and goals were reviewed. ASSESSMENT  Patient continues with skilled PT services and is progressing towards goals. Pt received supine in bed and willing to work with therapy. Pt stated she was able to tolerate getting to [facility] with staff last night, rated her pain at rest at 6/10. Pt stated that she had not had any nausea since she vomited yesterday, thinks its bc of the pain meds. Pt continued to tolerate supine exercises with VC for form. Pt continued with gait training with STS from bed with CGA and additional time due to slow movements.  Pt able to tolerate up to 25' with RW, CGA within room. Pt continued to need VC for sequencing during gait training. Pt completed session seated in chair with LE supported with table, call bell within reach and all needs met. Pt stated that the pain was decreasing while sitting. Rn notified of session. Current Level of Function Impacting Discharge (mobility/balance): CGA for mobility    Other factors to consider for discharge: pain management         PLAN :  Patient continues to benefit from skilled intervention to address the above impairments. Continue treatment per established plan of care. to address goals. Recommendation for discharge: (in order for the patient to meet his/her long term goals)  Physical therapy at least 2 days/week in the home     This discharge recommendation:  Has not yet been discussed the attending provider and/or case management    IF patient discharges home will need the following DME: patient owns DME required for discharge       SUBJECTIVE:   Patient stated Palmer Reddish listen this time.     OBJECTIVE DATA SUMMARY:   Critical Behavior:  Neurologic State: Alert, Appropriate for age  Orientation Level: Oriented X4  Cognition: Appropriate decision making, Appropriate for age attention/concentration, Appropriate safety awareness, Follows commands       Functional Mobility Training:  Bed Mobility:  Supine to Sit: Contact guard assistance  Sit to Supine: Contact guard assistance  Scooting: Contact guard assistance    Transfers:  Sit to Stand: Contact guard assistance  Stand to Sit: Contact guard assistance     Balance:  Sitting: Intact  Standing: Impaired  Standing - Static: Constant support;Good  Standing - Dynamic : Constant support; Fair    Ambulation/Gait Training:  Distance (ft): 25 Feet (ft)  Assistive Device: Gait belt;Walker, rolling  Ambulation - Level of Assistance: Contact guard assistance; Additional time  Gait Abnormalities: Antalgic  Base of Support: Shift to left  Speed/Mercedes: Pace decreased (<100 feet/min) Therapeutic Exercises:     EXERCISE   Sets   Reps   Active Active Assist   Passive Self ROM   Comments   Ankle Pumps 1 5 [x]                                        []                                        []                                        []                                           Quad Sets 1 5 [x]                                        []                                        []                                        []                                           Hamstring Sets   []                                        []                                        []                                        []                                           Short Arc Quads   []                                        []                                        []                                        []                                           Glute sets 1 5   [x]                                          []                                          []                                          []                                           Heel Slides   []                                        []                                        []                                        []                                           Long Arc Quads   []                                        []                                        []                                        []                                           Knee Flexion Stretch   []                                        []                                        []                                        []                                           Straight Leg Raises   []                                        []                                        []                                        []                                               Pain Ratin/10 post activity    Activity Tolerance:   Good and Fair    After treatment patient left in no apparent distress: Sitting in chair and Call bell within reach    COMMUNICATION/COLLABORATION:   The patients plan of care was discussed with: Registered nurse.      Nay Mcdermott PTA   Time Calculation: 25 mins

## 2021-03-05 NOTE — PROGRESS NOTES
Patient is intolerant to the Oxycodone, will try Hydrocodone. Please give as next dosing for pain. Evaluate tolerance. Increased Zofran to 8 mg PRN, provided a Rx for home. Outpatient pharmacy notified of Rx narcotic change. Patient has cleared PT for discharge home. Monitor for nausea improvement. If comfortable and stable, may discharge to Home HC.

## 2021-03-05 NOTE — PROGRESS NOTES
Physical therapy    Pt received supine with continued increase of pain 8/10  And nausea after change in medication,. Defer this session will follow up tomorrow.      Professionally,     Marcela Cobb PTA

## 2021-03-05 NOTE — PROGRESS NOTES
TOTAL KNEE PROGRESS NOTE      Subjective:     Post-Operative Day: 2 Status Post right Total Knee Arthroplasty  Systemic or Specific Complaints:No Complaints    Objective:     Patient Vitals for the past 24 hrs:   BP Temp Pulse Resp SpO2   03/05/21 0244 133/78 98.4 °F (36.9 °C) 78 15 94 %   03/04/21 2100 132/70 98.7 °F (37.1 °C) 70 15 96 %   03/04/21 1547 138/80 99 °F (37.2 °C) 72 17 98 %   03/04/21 1019 130/78  81     03/04/21 0832 (!) 134/101 97.7 °F (36.5 °C) 70 16 98 %       General: alert, cooperative, no distress, appears stated age   Wound: Wound clean and dry no evidence of infection. , No Erythema, No Edema, No Drainage and Wound Intact   Motion: Extension: Full Extension   DVT Exam: No evidence of DVT seen on physical exam.  Negative Mi's sign. No cords or calf tenderness. No significant calf/ankle edema. Data Review:    Recent Results (from the past 24 hour(s))   GLUCOSE, POC    Collection Time: 03/04/21 11:08 AM   Result Value Ref Range    Glucose (POC) 97 65 - 100 mg/dL    Performed by [id number], POC    Collection Time: 03/04/21  4:22 PM   Result Value Ref Range    Glucose (POC) 125 (H) 65 - 100 mg/dL    Performed by [id number], POC    Collection Time: 03/04/21  9:09 PM   Result Value Ref Range    Glucose (POC) 121 (H) 65 - 100 mg/dL    Performed by [id number], POC    Collection Time: 03/05/21  6:32 AM   Result Value Ref Range    Glucose (POC) 101 (H) 65 - 100 mg/dL    Performed by [employer]          Assessment:     Status Post right Total Knee Arthroplasty. Doing well postoperatively. . Bandage aquacel, stable. Labs stable. PT progressing well, anticipate clearance today. Pain control improving.      Plan:     Continues current post-op course  Continue PT per protocol  Plan to discharge to [facility]. today if cleared by PT

## 2021-03-05 NOTE — PROGRESS NOTES
Bedside and Verbal shift change report given to Berto Jane (oncoming nurse) by Rose Metz (offgoing nurse). Report included the following information SBAR, Kardex, Procedure Summary, Intake/Output and MAR.

## 2021-03-06 VITALS
HEART RATE: 72 BPM | WEIGHT: 172.84 LBS | SYSTOLIC BLOOD PRESSURE: 127 MMHG | TEMPERATURE: 97.8 F | OXYGEN SATURATION: 96 % | RESPIRATION RATE: 16 BRPM | BODY MASS INDEX: 29.51 KG/M2 | HEIGHT: 64 IN | DIASTOLIC BLOOD PRESSURE: 71 MMHG

## 2021-03-06 LAB
GLUCOSE BLD STRIP.AUTO-MCNC: 102 MG/DL (ref 65–100)
GLUCOSE BLD STRIP.AUTO-MCNC: 115 MG/DL (ref 65–100)
SERVICE CMNT-IMP: ABNORMAL
SERVICE CMNT-IMP: ABNORMAL

## 2021-03-06 PROCEDURE — 77030012893

## 2021-03-06 PROCEDURE — 74011250637 HC RX REV CODE- 250/637: Performed by: PHYSICIAN ASSISTANT

## 2021-03-06 PROCEDURE — 82962 GLUCOSE BLOOD TEST: CPT

## 2021-03-06 PROCEDURE — 97116 GAIT TRAINING THERAPY: CPT

## 2021-03-06 RX ORDER — ONDANSETRON 2 MG/ML
4 INJECTION INTRAMUSCULAR; INTRAVENOUS
Status: DISCONTINUED | OUTPATIENT
Start: 2021-03-06 | End: 2021-03-06 | Stop reason: HOSPADM

## 2021-03-06 RX ADMIN — ACETAMINOPHEN 650 MG: 325 TABLET ORAL at 03:13

## 2021-03-06 RX ADMIN — HYDROCHLOROTHIAZIDE: 25 TABLET ORAL at 09:03

## 2021-03-06 RX ADMIN — ONDANSETRON 8 MG: 4 TABLET, ORALLY DISINTEGRATING ORAL at 12:08

## 2021-03-06 RX ADMIN — Medication 10 ML: at 06:08

## 2021-03-06 RX ADMIN — DOCUSATE SODIUM 50 MG AND SENNOSIDES 8.6 MG 1 TABLET: 8.6; 5 TABLET, FILM COATED ORAL at 09:03

## 2021-03-06 RX ADMIN — ASPIRIN 325 MG: 325 TABLET, COATED ORAL at 09:03

## 2021-03-06 RX ADMIN — METOPROLOL SUCCINATE 50 MG: 50 TABLET, EXTENDED RELEASE ORAL at 09:03

## 2021-03-06 RX ADMIN — POTASSIUM CHLORIDE 10 MEQ: 750 TABLET, FILM COATED, EXTENDED RELEASE ORAL at 09:03

## 2021-03-06 RX ADMIN — CELECOXIB 200 MG: 200 CAPSULE ORAL at 09:03

## 2021-03-06 RX ADMIN — HYDROCODONE BITARTRATE AND ACETAMINOPHEN 1 TABLET: 10; 325 TABLET ORAL at 06:08

## 2021-03-06 RX ADMIN — ACETAMINOPHEN 650 MG: 325 TABLET ORAL at 09:03

## 2021-03-06 RX ADMIN — METFORMIN HYDROCHLORIDE 500 MG: 500 TABLET ORAL at 07:56

## 2021-03-06 RX ADMIN — POLYETHYLENE GLYCOL 3350 17 G: 17 POWDER, FOR SOLUTION ORAL at 09:03

## 2021-03-06 NOTE — PROGRESS NOTES
Bedside and Verbal shift change report given to Velma Liu (oncoming nurse) by [facility] (offgoing nurse). Report included the following information SBAR, Kardex, Procedure Summary, Intake/Output and MAR.

## 2021-03-06 NOTE — PROGRESS NOTES
The Joint Replacement Discharge Education video was reviewed by the patient/family. The content was discussed utilizing teach back, questions were answered. The patient verbalized an understanding of the instructions. LINK TO JOINT DISCHARGE EDUCATION VIDEO:  [id number]. [email or web address]      I have reviewed discharge instructions with the patient and spouse. The patient and spouse verbalized understanding. A signed copy of pt's discharge instructions were placed on pt's chart. Pt's signature pad on WOW was broken.

## 2021-03-06 NOTE — PROGRESS NOTES
Problem: Mobility Impaired (Adult and Pediatric)  Goal: *Acute Goals and Plan of Care (Insert Text)  Description: FUNCTIONAL STATUS PRIOR TO ADMISSION: Patient was independent and active without use of DME.    HOME SUPPORT PRIOR TO ADMISSION: The patient lived with family but did not require assist.    Physical Therapy Goals  Initiated 3/3/2021    1. Patient will move from supine to sit and sit to supine , scoot up and down, and roll side to side in bed with modified independence within 4 days. 2. Patient will perform sit to stand with modified independence within 4 days. 3. Patient will ambulate with modified independence for 300 feet with the least restrictive device within 4 days. 4. Patient will ascend/descend 4 stairs with 1 handrail(s) with supervision/set-up within 4 days. 5. Patient will perform home exercise program per protocol with modified independence within 4 days. 6. Patient will demonstrate AROM 0-90 degrees in operative joint within 4 days. Outcome: Progressing Towards Goal    PHYSICAL THERAPY TREATMENT  Patient: Kennedi Puckett (78 y.o. female)  Date: 3/6/2021  Diagnosis: Primary localized osteoarthritis of right knee [M17.11] Primary localized osteoarthritis of right knee  Procedure(s) (LRB):  RIGHT TOTAL KNEE ARTHROPLASTY (Right) 3 Days Post-Op  Precautions:    Chart, physical therapy assessment, plan of care and goals were reviewed. ASSESSMENT  Patient continues with skilled PT services and is progressing towards goals. Pt received sitting in chair and willing to work with therapy. Pt reported decrease of pain and no longer any nausea from the medications. Pt able to tolerate gait training today up to 76' with RW with less assistance needed. Pt continues to need VC for sequencing, and slower pace. Pt continued with stair training with 2 steps with HHA, and an additional 2 steps with spouse for pt ed.  Pt able to tolerate gait training to return to room with reported slight increase of pain but tolerable. Pt completed the session seated in chair with call bell within reach and all needs met at the time with family in room. Rn notified of session. Current Level of Function Impacting Discharge (mobility/balance): SBA/CGA for all mobility    Other factors to consider for discharge: pain management, activity pacing         PLAN :  Patient continues to benefit from skilled intervention to address the above impairments. Continue treatment per established plan of care. to address goals. Recommendation for discharge: (in order for the patient to meet his/her long term goals)  Physical therapy at least 2 days/week in the home     This discharge recommendation:  Has not yet been discussed the attending provider and/or case management    IF patient discharges home will need the following DME: patient owns DME required for discharge       SUBJECTIVE:   Patient stated I feel better today.     OBJECTIVE DATA SUMMARY:   Critical Behavior:  Neurologic State: Alert  Orientation Level: Oriented X4  Cognition: Appropriate for age attention/concentration     Functional Mobility Training:  Bed Mobility:  Supine to Sit: Stand-by assistance  Scooting: Stand-by assistance    Transfers:  Sit to Stand: Stand-by assistance  Stand to Sit: Stand-by assistance     Balance:  Sitting: Intact  Standing: Impaired  Standing - Static: Constant support;Good  Standing - Dynamic : Constant support;Good;Fair    Ambulation/Gait Training:  Distance (ft): 75 Feet (ft)  Assistive Device: Gait belt;Walker, rolling  Ambulation - Level of Assistance: Contact guard assistance  Gait Abnormalities: Antalgic  Base of Support: Shift to left  Speed/Mercedes: Pace decreased (<100 feet/min)    Stairs:  Number of Stairs Trained: 4  Stairs - Level of Assistance: Contact guard assistance   Rail Use: None      Pain Ratin/10 post activity    Activity Tolerance:   Good    After treatment patient left in no apparent distress:   Sitting in chair and Call bell within reach    COMMUNICATION/COLLABORATION:   The patients plan of care was discussed with: Registered nurse.      Ameya Mcdermott PTA   Time Calculation: 18 mins

## 2021-03-06 NOTE — PROGRESS NOTES
TOTAL KNEE PROGRESS NOTE      Subjective:     Post-Operative Day: 3 Status Post right Total Knee Arthroplasty  Systemic or Specific Complaints:No Complaints    Objective:     Patient Vitals for the past 24 hrs:   BP Temp Pulse Resp SpO2   03/06/21 0317 122/72 98.7 °F (37.1 °C) 75 16 96 %   03/05/21 2104 106/68 98 °F (36.7 °C) 67 16 98 %   03/05/21 1543 130/82 98.2 °F (36.8 °C) 72 17 98 %   03/05/21 1503 121/75 98.1 °F (36.7 °C) 70 16 98 %   03/05/21 1212 136/78 98 °F (36.7 °C) 69 17 98 %   03/05/21 0759 (!) 142/85 98.5 °F (36.9 °C) 72 16 98 %       General: alert, cooperative, no distress, appears stated age   Wound: Wound clean and dry no evidence of infection. , No Erythema, No Edema, No Drainage and Wound Intact   Motion: Extension: Full Extension   DVT Exam: No evidence of DVT seen on physical exam.  Negative Mi's sign. No cords or calf tenderness. No significant calf/ankle edema. Data Review:    Recent Results (from the past 24 hour(s))   GLUCOSE, POC    Collection Time: 03/05/21 10:57 AM   Result Value Ref Range    Glucose (POC) 119 (H) 65 - 100 mg/dL    Performed by [address] [place] [id number], POC    Collection Time: 03/05/21  4:18 PM   Result Value Ref Range    Glucose (POC) 113 (H) 65 - 100 mg/dL    Performed by [id number], POC    Collection Time: 03/05/21  9:10 PM   Result Value Ref Range    Glucose (POC) 156 (H) 65 - 100 mg/dL    Performed by [id number], POC    Collection Time: 03/06/21  6:18 AM   Result Value Ref Range    Glucose (POC) 102 (H) 65 - 100 mg/dL    Performed by Carlos Enrique Stevenson          Assessment:     Status Post right Total Knee Arthroplasty. Doing well postoperatively. . Bandage aquacel, initial drainage looks good. Labs stable. PT progressing well, basically cleared. Pain control much improved.   Patient feeling much better this AM.  States, \"Ready to get out of here, get me out by 9.\"     Plan:     Continues current post-op course  Continue PT per protocol  PT to check-in with patient, may discharge to Home [facility] this AM.

## 2021-03-06 NOTE — PROGRESS NOTES
Problem: Falls - Risk of  Goal: *Absence of Falls  Description: Document Yara Yates Fall Risk and appropriate interventions in the flowsheet.   Outcome: Resolved/Met  Note: Fall Risk Interventions:  Mobility Interventions: Patient to call before getting OOB         Medication Interventions: Patient to call before getting OOB    Elimination Interventions: Call light in reach              Problem: Patient Education: Go to Patient Education Activity  Goal: Patient/Family Education  Outcome: Resolved/Met     Problem: Patient Education: Go to Patient Education Activity  Goal: Patient/Family Education  Outcome: Resolved/Met     Problem: Discharge Planning  Goal: *Discharge to safe environment  Outcome: Resolved/Met

## 2021-03-09 NOTE — DISCHARGE SUMMARY
Discharge Summary    Physician: Shira Dent MD    Physician Assistant: Flora Adams PA-C    Admit Diagnosis:  Primary localized osteoarthritis of right knee [M17.11]    Final Diagnosis:   1. Advanced degenerative arthritis of right knee . Procedure: Right total knee replacement    Complications: None. History of Present Illness: The patient has a long-standing history of pain within the right knee . The patient has severe degenerative arthritis of the right knee and has exhausted conservative therapy at this time including prior intra-articular injections, activity modifications, OTC NSAIDS. The patient has been limited in their ability to perform ADLs, walk short distances or climb steps appropriately. The patient presents for the above-mentioned procedure. The patient has been cleared from a medical and cardiac standpoint. Past Medical History:  Recorded in the chart. Physical Examination: Also recorded in the chart. The patient is noted for ambulating with an antalgic gait favoring the right side. Examination of the right knee reveals ROM 0-112. Varus deformity. Skin intact. No effusion. No instability. PT at medial joint space . X-rays reveal complete medial joint space narrowing. Course in the Hospital:  The patient was admitted to the hospital.  The Pt. Underwent a right total knee replacement . Postoperatively, the pt. did well. The pt. Remained stable from a medical standpoint. The patient ambulated, WBAT weightbearing within the hospital with Physical Therapy. The patient continued with this therapy at [facility] with PT. The patient began taking  mg BID post-operatively for DVT prophylaxis and will continue on this for 30 days. At the time of discharge, there was no evidence of any DVT noted. The patient had negative Homans signs bilateral lower extremities. At the time of discharge, the patient's right knee incision appeared with staples intact.   No signs of infection or inflammation noted. The patient will follow up in our office in 2-1/2 weeks. DC med list:  Discharge Medication List as of 3/6/2021 11:57 AM      START taking these medications    Details   HYDROcodone-acetaminophen (NORCO)  mg tablet Take 1 Tab by mouth every four to six (4-6) hours as needed for Pain for up to 7 days. Max Daily Amount: 6 Tabs., Normal, Disp-40 Tab, R-0      ondansetron (ZOFRAN ODT) 8 mg disintegrating tablet Take 1 Tab by mouth every eight (8) hours as needed for Nausea or Vomiting., Normal, Disp-10 Tab, R-0      aspirin (ASPIRIN) 325 mg tablet Take 1 Tab by mouth two (2) times a day for 30 days. , Normal, Disp-60 Tab, R-0         CONTINUE these medications which have NOT CHANGED    Details   diclofenac (Voltaren Arthritis Pain) 1 % gel Apply  to affected area as needed for Pain., Historical Med      Blood-Glucose Meter monitoring kit Test glucose daily and as needed prediabetes, Normal, Disp-1 Kit, R-0      glucose blood VI test strips (ASCENSIA AUTODISC VI, ONE TOUCH ULTRA TEST VI) strip Test glucose daily and as needed prediabetes, Normal, Disp-100 Strip, R-3      lancets misc Test glucose daily and as needed prediabetes, Normal, Disp-100 Each, R-3      potassium chloride SR (KLOR-CON 10) 10 mEq tablet Take 1 Tab by mouth daily. , Normal, Disp-90 Tab, R-1      metoprolol succinate (TOPROL-XL) 50 mg XL tablet Take 1 Tab by mouth daily. , Normal, Disp-90 Tab, R-1      lisinopril-hydroCHLOROthiazide (PRINZIDE, ZESTORETIC) 20-25 mg per tablet Take 1 Tab by mouth daily. , Normal, Disp-90 Tab, R-1      rosuvastatin (CRESTOR) 5 mg tablet Take 1 Tab by mouth nightly., Normal, Disp-90 Tab, R-1      cholecalciferol, vitamin D3, 50 mcg (2,000 unit) tab Take 0.5 Tabs by mouth daily. , Normal, Disp-90 Tab, R-3      ALPRAZolam (XANAX) 0.5 mg tablet Take 1 tab as needed for anxiety or to sleep.  Do not take daily, Normal, Disp-30 Tab, R-0      metFORMIN (GLUCOPHAGE) 500 mg tablet TAKE ONE TABLET BY MOUTH TWICE A DAY WITH MEALS, Normal, Disp-180 Tab, R-1             Patient Disposition: Home  Followup Care:  Discharge Condition: good  PT/OT per Home Health  Regular Diet  As directed    Follow-up Information     Follow up With Specialties Details Why Contact Info    Skiff, Vivianne Gata, NP Nurse Practitioner   [address] [place] [address]  [address]  [facility] [id number]  [phone]      [place] [address]   [address]  [place] [address] [facility]                  Pricila Leyva PA-C

## 2021-03-12 ENCOUNTER — TELEPHONE (OUTPATIENT)
Dept: INTERNAL MEDICINE CLINIC | Age: 61
End: 2021-03-12

## 2021-03-17 ENCOUNTER — OFFICE VISIT (OUTPATIENT)
Dept: INTERNAL MEDICINE CLINIC | Age: 61
End: 2021-03-17
Payer: COMMERCIAL

## 2021-03-17 VITALS
SYSTOLIC BLOOD PRESSURE: 138 MMHG | WEIGHT: 169.2 LBS | HEART RATE: 84 BPM | BODY MASS INDEX: 28.89 KG/M2 | HEIGHT: 64 IN | OXYGEN SATURATION: 96 % | RESPIRATION RATE: 16 BRPM | DIASTOLIC BLOOD PRESSURE: 86 MMHG | TEMPERATURE: 97.5 F

## 2021-03-17 DIAGNOSIS — Z96.651 HISTORY OF TOTAL RIGHT KNEE REPLACEMENT: Primary | ICD-10-CM

## 2021-03-17 DIAGNOSIS — R73.09 ELEVATED HEMOGLOBIN A1C: ICD-10-CM

## 2021-03-17 DIAGNOSIS — I10 HYPERTENSION, GOAL BELOW 140/90: ICD-10-CM

## 2021-03-17 PROCEDURE — 99213 OFFICE O/P EST LOW 20 MIN: CPT | Performed by: NURSE PRACTITIONER

## 2021-03-17 NOTE — PROGRESS NOTES
Subjective:      Geraldine Murray is a 56 y.o. female who presents today for hospital follow up. Hospital follow up: she was admitetd from 3/3-3/6 for a total right knee replacement. She notes she is doing well. She has a follow up scheduled for later today with Dr. Pooja Layne. Pain is well controlled with tylenol. Pressure with slight elevation. BP Readings from Last 3 Encounters:   03/17/21 138/86   03/06/21 127/71   02/23/21 (!) 147/91         Patient Active Problem List    Diagnosis Date Noted    Primary localized osteoarthritis of right knee 03/02/2021    Ganglion cyst 08/11/2020    Elevated hemoglobin A1c 04/16/2018    Obesity (BMI 30-39.9) 04/13/2018    Insomnia 07/20/2017    Diverticulitis 02/01/2017    Colovesical fistula 02/01/2017    Diverticulitis of intestine with abscess 09/13/2016    Hypovitaminosis D 04/27/2015    Microscopic hematuria 04/27/2015    Sigmoid diverticulitis 02/14/2014    Diverticulosis of large intestine with hemorrhage 02/14/2014     Class: Chronic    Anxiety disorder 02/14/2014     Class: Chronic    Dehydration 02/14/2014    Hyperlipidemia with target LDL less than 100 05/13/2013     Class: Chronic    Hypertension, goal below 140/90 10/16/2012     Class: Chronic     Current Outpatient Medications   Medication Sig Dispense Refill    ondansetron (ZOFRAN ODT) 8 mg disintegrating tablet Take 1 Tab by mouth every eight (8) hours as needed for Nausea or Vomiting. 10 Tab 0    aspirin (ASPIRIN) 325 mg tablet Take 1 Tab by mouth two (2) times a day for 30 days. 60 Tab 0    diclofenac (Voltaren Arthritis Pain) 1 % gel Apply  to affected area as needed for Pain.       Blood-Glucose Meter monitoring kit Test glucose daily and as needed prediabetes 1 Kit 0    glucose blood VI test strips (ASCENSIA AUTODISC VI, ONE TOUCH ULTRA TEST VI) strip Test glucose daily and as needed prediabetes 100 Strip 3    lancets misc Test glucose daily and as needed prediabetes 100 Each 3    potassium chloride SR (KLOR-CON 10) 10 mEq tablet Take 1 Tab by mouth daily. 90 Tab 1    metoprolol succinate (TOPROL-XL) 50 mg XL tablet Take 1 Tab by mouth daily. (Patient taking differently: Take 50 mg by mouth Every morning.) 90 Tab 1    lisinopril-hydroCHLOROthiazide (PRINZIDE, ZESTORETIC) 20-25 mg per tablet Take 1 Tab by mouth daily. (Patient taking differently: Take 1 Tab by mouth Every morning.) 90 Tab 1    rosuvastatin (CRESTOR) 5 mg tablet Take 1 Tab by mouth nightly. 90 Tab 1    cholecalciferol, vitamin D3, 50 mcg (2,000 unit) tab Take 0.5 Tabs by mouth daily. (Patient taking differently: Take 2,000 Units by mouth every other day.) 90 Tab 3    ALPRAZolam (XANAX) 0.5 mg tablet Take 1 tab as needed for anxiety or to sleep. Do not take daily 30 Tab 0    metFORMIN (GLUCOPHAGE) 500 mg tablet TAKE ONE TABLET BY MOUTH TWICE A DAY WITH MEALS 180 Tab 1     Allergies   Allergen Reactions    Cephalexin Hives     Past Medical History:   Diagnosis Date    Anxiety     Arthritis     Diabetes ([facility] [address]     Diverticulosis of large intestine with hemorrhage 02/14/2014    distal sigmoid colon, mid-sigmoid colon, descending colon - colonoscopy 11/9/16    Ganglion cyst 8/11/2020    Hyperlipemia      5/2016; not on medication    Hypertension     Hypovitaminosis D 04/27/2015    level normal 5/2016    Microscopic hematuria 4/27/2015     Past Surgical History:   Procedure Laterality Date    HX BREAST BIOPSY Right     Stereo Bx 2007 - BENIGN    HX COLONOSCOPY  07/23/2015    HX GYN      ectopic pregnancy    HX HYSTERECTOMY  3/17/2005    TVH    HX ORTHOPAEDIC Right 08/20/2020    ganglian cyst right wrist, right ring finger trigger finger release    GA ABDOMEN SURGERY PROC UNLISTED  02/01/2017    laparoscopic sigmoid colectomy Dr. Rayshawn Barbour        Review of Systems    A comprehensive review of systems was negative except for that written in the HPI.      Objective:     Visit Vitals  /86 Comment: repeat right manual   Pulse 84   Temp 97.5 °F (36.4 °C) (Temporal)   Resp 16   Ht 5' 4\" (1.626 m)   Wt 169 lb 3.2 oz (76.7 kg)   LMP 02/12/2005   SpO2 96%   BMI 29.04 kg/m²     General appearance: alert, cooperative, no distress, appears stated age  Head: Normocephalic, without obvious abnormality, atraumatic  Eyes: negative  Lungs: clear to auscultation bilaterally  Heart: regular rate and rhythm, S1, S2 normal, no murmur, click, rub or gallop  Extremities: right knee looks excellent, incision is clean and healing well, staples look great. Antalgic gait with one crutch. She is moving well   Pulses: 2+ and symmetric  Skin: Skin color, texture, turgor normal. No rashes or lesions. Surgical site looks good  Neurologic: Grossly normal  Nursing note and vitals reviewed  Assessment/Plan:     1. History of total right knee replacement  - she is doing well post op, continue to follow with ortho    2. Elevated hemoglobin A1c  - stable    3. Hypertension, goal below 140/90  - stable, she will continue to monitor pressures at home       Follow-up and Dispositions    · Return in about 6 months (around 9/17/2021) for Hypertension. Advised her to call back or return to office if symptoms worsen/change/persist.  Discussed expected course/resolution/complications of diagnosis in detail with patient. Medication risks/benefits/costs/interactions/alternatives discussed with patient. She was given an after visit summary which includes diagnoses, current medications, & vitals. She expressed understanding with the diagnosis and plan.

## 2022-02-08 ENCOUNTER — TRANSCRIBE ORDER (OUTPATIENT)
Dept: MAMMOGRAPHY | Age: 62
End: 2022-02-08

## 2022-02-08 DIAGNOSIS — Z12.31 VISIT FOR SCREENING MAMMOGRAM: Primary | ICD-10-CM

## 2022-03-02 ENCOUNTER — HOSPITAL ENCOUNTER (OUTPATIENT)
Dept: MAMMOGRAPHY | Age: 62
Discharge: HOME | End: 2022-03-02
Payer: COMMERCIAL

## 2022-03-02 DIAGNOSIS — Z12.31 VISIT FOR SCREENING MAMMOGRAM: ICD-10-CM

## 2022-03-02 PROCEDURE — 77067 SCR MAMMO BI INCL CAD: CPT

## 2022-03-18 PROBLEM — K57.92 DIVERTICULITIS: Status: ACTIVE | Noted: 2017-02-01

## 2022-03-18 PROBLEM — N32.1 COLOVESICAL FISTULA: Status: ACTIVE | Noted: 2017-02-01

## 2022-03-19 PROBLEM — M67.40 GANGLION CYST: Status: ACTIVE | Noted: 2020-08-11

## 2022-03-19 PROBLEM — M17.11 PRIMARY LOCALIZED OSTEOARTHRITIS OF RIGHT KNEE: Status: ACTIVE | Noted: 2021-03-02

## 2022-03-19 PROBLEM — G47.00 INSOMNIA: Status: ACTIVE | Noted: 2017-07-20

## 2022-03-19 PROBLEM — E66.9 OBESITY (BMI 30-39.9): Status: ACTIVE | Noted: 2018-04-13

## 2022-03-20 PROBLEM — R73.09 ELEVATED HEMOGLOBIN A1C: Status: ACTIVE | Noted: 2018-04-16

## 2023-05-10 RX ORDER — POTASSIUM CHLORIDE 750 MG/1
TABLET, FILM COATED, EXTENDED RELEASE ORAL DAILY
COMMUNITY
Start: 2021-02-08

## 2023-05-10 RX ORDER — ALPRAZOLAM 0.5 MG/1
TABLET ORAL
COMMUNITY
Start: 2021-02-08

## 2023-05-10 RX ORDER — METOPROLOL SUCCINATE 50 MG/1
TABLET, EXTENDED RELEASE ORAL DAILY
COMMUNITY
Start: 2021-02-08

## 2023-05-10 RX ORDER — LANCETS 30 GAUGE
EACH MISCELLANEOUS
COMMUNITY
Start: 2021-02-10

## 2023-05-10 RX ORDER — ROSUVASTATIN CALCIUM 5 MG/1
TABLET, COATED ORAL
COMMUNITY
Start: 2021-02-08

## 2023-05-10 RX ORDER — LISINOPRIL AND HYDROCHLOROTHIAZIDE 25; 20 MG/1; MG/1
1 TABLET ORAL DAILY
COMMUNITY
Start: 2021-02-08

## 2023-05-22 ENCOUNTER — TRANSCRIBE ORDERS (OUTPATIENT)
Facility: HOSPITAL | Age: 63
End: 2023-05-22

## 2023-05-22 DIAGNOSIS — Z12.31 VISIT FOR SCREENING MAMMOGRAM: Primary | ICD-10-CM

## 2023-06-21 ENCOUNTER — HOSPITAL ENCOUNTER (OUTPATIENT)
Facility: HOSPITAL | Age: 63
Discharge: HOME | End: 2023-06-24
Payer: COMMERCIAL

## 2023-06-21 DIAGNOSIS — Z12.31 VISIT FOR SCREENING MAMMOGRAM: ICD-10-CM

## 2023-06-21 PROCEDURE — 77063 BREAST TOMOSYNTHESIS BI: CPT

## 2024-08-23 ENCOUNTER — HOSPITAL ENCOUNTER (OUTPATIENT)
Facility: HOSPITAL | Age: 64
End: 2024-08-23
Payer: COMMERCIAL

## 2024-08-23 ENCOUNTER — TRANSCRIBE ORDERS (OUTPATIENT)
Facility: HOSPITAL | Age: 64
End: 2024-08-23

## 2024-08-23 DIAGNOSIS — M25.561 PAIN IN BOTH KNEES, UNSPECIFIED CHRONICITY: ICD-10-CM

## 2024-08-23 DIAGNOSIS — M25.561 PAIN IN BOTH KNEES, UNSPECIFIED CHRONICITY: Primary | ICD-10-CM

## 2024-08-23 DIAGNOSIS — M25.562 PAIN IN BOTH KNEES, UNSPECIFIED CHRONICITY: ICD-10-CM

## 2024-08-23 DIAGNOSIS — M25.562 PAIN IN BOTH KNEES, UNSPECIFIED CHRONICITY: Primary | ICD-10-CM

## 2024-08-23 PROCEDURE — 73562 X-RAY EXAM OF KNEE 3: CPT

## 2024-12-24 ENCOUNTER — HOSPITAL ENCOUNTER (OUTPATIENT)
Facility: HOSPITAL | Age: 64
Discharge: HOME | End: 2024-12-27
Payer: MEDICARE

## 2024-12-24 DIAGNOSIS — Z12.31 ENCOUNTER FOR SCREENING MAMMOGRAM FOR MALIGNANT NEOPLASM OF BREAST: ICD-10-CM

## 2024-12-24 PROCEDURE — 77063 BREAST TOMOSYNTHESIS BI: CPT

## (undated) DEVICE — SYR 50ML LR LCK 1ML GRAD NSAF --

## (undated) DEVICE — YANKAUER BULB TIP, NO VENT: Brand: ARGYLE

## (undated) DEVICE — WRAP SURG W1.31XL1.34M CARD FOR PT 165-172CM THERMOWRP

## (undated) DEVICE — GARMENT,MEDLINE,DVT,INT,CALF,MED, GEN2: Brand: MEDLINE

## (undated) DEVICE — (D)PREP SKN CHLRAPRP APPL 26ML -- CONVERT TO ITEM 371833

## (undated) DEVICE — Device

## (undated) DEVICE — SCRUB DRY SURG EZ SCRUB BRUSH PREOPERATIVE GRN

## (undated) DEVICE — STRYKER PERFORMANCE SERIES SAGITTAL BLADE: Brand: STRYKER PERFORMANCE SERIES

## (undated) DEVICE — SUTURE VCRL SZ 1 L36IN ABSRB UD L36MM CT-1 1/2 CIR J947H

## (undated) DEVICE — SUT ETHLN 3-0 18IN PS1 BLK --

## (undated) DEVICE — JELLY,LUBE,STERILE,FLIP TOP,TUBE,4-OZ: Brand: MEDLINE

## (undated) DEVICE — NEEDLE HYPO 22GA L1.5IN BLK S STL HUB POLYPR SHLD REG BVL

## (undated) DEVICE — SOLUTION IRRIGATION H2O 0797305] ICU MEDICAL INC]

## (undated) DEVICE — BANDAGE COMPR M W6INXL10YD WHT BGE VELC E MTRX HK AND LOOP

## (undated) DEVICE — STERILE POLYISOPRENE POWDER-FREE SURGICAL GLOVES: Brand: PROTEXIS

## (undated) DEVICE — 3M™ IOBAN™ 2 ANTIMICROBIAL INCISE DRAPE 6651EZ: Brand: IOBAN™ 2

## (undated) DEVICE — SUTURE VCRL SZ 3-0 L27IN ABSRB UD L26MM SH 1/2 CIR J416H

## (undated) DEVICE — SUT SLK 0 30IN SH BLK --

## (undated) DEVICE — SOLUTION IRRIG 3000ML 0.9% SOD CHL FLX CONT 0797208] ICU MEDICAL INC]

## (undated) DEVICE — STRAP,POSITIONING,KNEE/BODY,FOAM,4X60": Brand: MEDLINE

## (undated) DEVICE — SUTURE PERMAHAND SZ 3-0 L30IN NONABSORBABLE BLK SILK BRAID A304H

## (undated) DEVICE — SUTURE PROL SZ 2-0 L36IN NONABSORBABLE BLU SH L26MM 1/2 CIR 8523H

## (undated) DEVICE — ARTICULATING RELOAD WITH TRI-STAPLE TECHNOLOGY: Brand: ENDO GIA

## (undated) DEVICE — SUTURE SZ 0 27IN 5/8 CIR UR-6  TAPER PT VIOLET ABSRB VICRYL J603H

## (undated) DEVICE — SOL IRR STRL H2O 1000ML BTL --

## (undated) DEVICE — ADPT CATH URETH 2IN LF --

## (undated) DEVICE — CONNECTOR TBNG IV 6-15 MM 6 IN Y STRL LF

## (undated) DEVICE — SLIM BODY SKIN STAPLER: Brand: APPOSE ULC

## (undated) DEVICE — CATHETER URETH 5FR L70CM POLYUR OLV FLX TIP W/ ADPT

## (undated) DEVICE — EVAC SMOKE SEECLEAR XCL -- SEE CLEAR

## (undated) DEVICE — DRAPE,EXTREMITY,89X128,STERILE: Brand: MEDLINE

## (undated) DEVICE — SOLUTION SCRB 4OZ 10% PVP I POVIDONE IOD TOP PAINT EXIDINE

## (undated) DEVICE — PREP SKN CHLRAPRP APL 26ML STR --

## (undated) DEVICE — CARTRIDGE BNE CEM MIX UNIV TWR VAC ROTOR BRK OFF NOZ W/O

## (undated) DEVICE — SUTURE PERMAHAND SZ 0 L18IN NONABSORBABLE BLK SILK BRAID A186H

## (undated) DEVICE — SUTURE VCRL SZ 2 L54IN ABSRB UD L65MM TP-1 1/2 CIR J880T

## (undated) DEVICE — KENDALL SCD EXPRESS SLEEVES, KNEE LENGTH, MEDIUM: Brand: KENDALL SCD

## (undated) DEVICE — SYR 20ML LL STRL LF --

## (undated) DEVICE — VISUALIZATION SYSTEM: Brand: CLEARIFY

## (undated) DEVICE — TRAY PREP DRY W/ PREM GLV 2 APPL 6 SPNG 2 UNDPD 1 OVERWRAP

## (undated) DEVICE — REM POLYHESIVE ADULT PATIENT RETURN ELECTRODE: Brand: VALLEYLAB

## (undated) DEVICE — CUSTOM CAST PD STR

## (undated) DEVICE — E-Z CLEAN, PTFE COATED, ELECTROSURGICAL LAPAROSCOPIC ELECTRODE, L-HOOK, 33 CM., SINGLE-USE, FOR USE WITH HAND CONTROL PENCIL: Brand: MEGADYNE

## (undated) DEVICE — UNDER BUTTOCKS DRAPE: Brand: CONVERTORS

## (undated) DEVICE — TAPE,CLOTH/SILK,CURAD,3"X10YD,LF,40/CS: Brand: CURAD

## (undated) DEVICE — GOWN,SIRUS,NONRNF,SETINSLV,2XL,18/CS: Brand: MEDLINE

## (undated) DEVICE — HANDLE LT SNAP ON ULT DURABLE LENS FOR TRUMPF ALC DISPOSABLE

## (undated) DEVICE — ACCESS PLATFORM FOR MINIMALLY INVASIVE SURGERY.: Brand: GELPORT® LAPAROSCOPIC  SYSTEM

## (undated) DEVICE — INFECTION CONTROL KIT SYS

## (undated) DEVICE — TOTAL TRAY, 16FR 10ML SIL FOLEY, URN: Brand: MEDLINE

## (undated) DEVICE — DRAIN SURG 19FR L025IN DIA63MM SIL CHN RND FULL FLUT CLS

## (undated) DEVICE — 4-PORT MANIFOLD: Brand: NEPTUNE 2

## (undated) DEVICE — MEDI-VAC NON-CONDUCTIVE SUCTION TUBING: Brand: CARDINAL HEALTH

## (undated) DEVICE — Z DISCONTINUED USE 2744636  DRESSING AQUACEL 14 IN ALG W3.5XL14IN POLYUR FLM CVR W/ HYDRCOLL

## (undated) DEVICE — KIT INFECTION CTRL ST FRAN --

## (undated) DEVICE — NEEDLE HYPO 18GA L1.5IN PNK S STL HUB POLYPR SHLD REG BVL

## (undated) DEVICE — SUTURE MCRYL SZ 4-0 L27IN ABSRB UD L19MM PS-2 1/2 CIR PRIM Y426H

## (undated) DEVICE — T4 HOOD

## (undated) DEVICE — ZIMMER® STERILE DISPOSABLE TOURNIQUET CUFF WITH PLC, DUAL PORT, SINGLE BLADDER, 34 IN. (86 CM)

## (undated) DEVICE — HANDLE STPLR REUSE FOR IDRIVE ULT PWR STPL SYS

## (undated) DEVICE — SURGICAL PROCEDURE PACK GYN LAPAROSCOPY CUST SMH LF

## (undated) DEVICE — AMD ANTIMICROBIAL DRAIN SPONGES, 6 PLY, 0.2% POLYHEXAMETHYLENE BIGUANIDE HCI (PHMB): Brand: EXCILON

## (undated) DEVICE — BAG DRAIN URIN 2000ML LF STRL -- CONVERT TO ITEM 363123

## (undated) DEVICE — HANDPIECE SET WITH BONE CLEANING TIP AND SUCTION TUBE: Brand: INTERPULSE

## (undated) DEVICE — 3M™ IOBAN™ 2 ANTIMICROBIAL INCISE DRAPE 6650EZ: Brand: IOBAN™ 2

## (undated) DEVICE — GOWN,SIRUS,NONRNF,SETINSLV,XL,20/CS: Brand: MEDLINE

## (undated) DEVICE — STERILE POLYISOPRENE POWDER-FREE SURGICAL GLOVES WITH EMOLLIENT COATING: Brand: PROTEXIS

## (undated) DEVICE — BULB SYRINGE, IRRIGATION WITH PROTECTIVE CAP, 60 CC, INDIVIDUALLY WRAPPED: Brand: DOVER

## (undated) DEVICE — CONTAINER,SPECIMEN,3OZ,OR STRL: Brand: MEDLINE

## (undated) DEVICE — MARYLAND JAW LAPAROSCOPIC SEALER/DIVIDER: Brand: LIGASURE

## (undated) DEVICE — DRAPE,REIN 53X77,STERILE: Brand: MEDLINE

## (undated) DEVICE — TUBING IRRIG L77IN DIA0.241IN L BOR FOR CYSTO W/ NVENT

## (undated) DEVICE — SUTURE PDS II SZ 1 L96IN ABSRB VLT XLH L70MM 1/2 CIR Z881G

## (undated) DEVICE — SUTURE PERMAHAND SZ 2-0 L30IN NONABSORBABLE BLK SILK W/O A305H

## (undated) DEVICE — CYSTOSCOPY PACK: Brand: CONVERTORS

## (undated) DEVICE — SUTURE STRATAFIX SYMMETRIC PDS + SZ 1 L18IN ABSRB VLT L48MM SXPP1A400

## (undated) DEVICE — UNIVERSAL FIXATION CANNULA: Brand: VERSAONE

## (undated) DEVICE — BLUNT DISSECTOR: Brand: ENDO PEANUT

## (undated) DEVICE — SKIN MARKER,REGULAR TIP WITH RULER AND LABELS: Brand: DEVON

## (undated) DEVICE — SUTURE VCRL SZ 2-0 L36IN ABSRB UD L40MM CT 1/2 CIR J957H

## (undated) DEVICE — BLADELESS OPTICAL TROCAR WITH FIXATION CANNULA: Brand: VERSAONE

## (undated) DEVICE — BLADELESS OPTICAL TROCAR WITH FIXATION CANNULA: Brand: VERSAPORT

## (undated) DEVICE — TOWEL,OR,DSP,ST,BLUE,STD,2/PK,40PK/CS: Brand: MEDLINE

## (undated) DEVICE — SOLUTION IRRIG 1000ML H2O STRL BLT

## (undated) DEVICE — DERMABOND SKIN ADH 0.7ML -- DERMABOND ADVANCED 12/BX

## (undated) DEVICE — DEVON™ KNEE AND BODY STRAP 60" X 3" (1.5 M X 7.6 CM): Brand: DEVON

## (undated) DEVICE — SOL IRR SOD CL 0.9% 3000ML --

## (undated) DEVICE — SUTURE VCRL SZ 3-0 L18IN ABSRB UD L26MM SH 1/2 CIR J864D

## (undated) DEVICE — PART NUMBER 108260, VTI 8 MHZ DISPOSABLE DOPPLER PROBE, STRAIGHT, BOX: Brand: VTI 8 MHZ DISPOSABLE DOPPLER PROBE, STRAIGHT, BOX